# Patient Record
Sex: FEMALE | Race: WHITE | Employment: OTHER | URBAN - METROPOLITAN AREA
[De-identification: names, ages, dates, MRNs, and addresses within clinical notes are randomized per-mention and may not be internally consistent; named-entity substitution may affect disease eponyms.]

---

## 2017-01-26 ENCOUNTER — HOSPITAL ENCOUNTER (OUTPATIENT)
Dept: LAB | Age: 72
Discharge: HOME | End: 2017-01-26
Payer: MEDICARE

## 2017-01-26 ENCOUNTER — OFFICE VISIT (OUTPATIENT)
Dept: INTERNAL MEDICINE CLINIC | Age: 72
End: 2017-01-26

## 2017-01-26 VITALS
RESPIRATION RATE: 14 BRPM | SYSTOLIC BLOOD PRESSURE: 132 MMHG | DIASTOLIC BLOOD PRESSURE: 78 MMHG | HEART RATE: 69 BPM | TEMPERATURE: 97.5 F | OXYGEN SATURATION: 5 % | HEIGHT: 68 IN | BODY MASS INDEX: 21.67 KG/M2 | WEIGHT: 143 LBS

## 2017-01-26 DIAGNOSIS — Z01.818 PREOP EXAMINATION: ICD-10-CM

## 2017-01-26 DIAGNOSIS — E11.9 DIABETES MELLITUS, STABLE (HCC): ICD-10-CM

## 2017-01-26 DIAGNOSIS — G47.33 OSA (OBSTRUCTIVE SLEEP APNEA): ICD-10-CM

## 2017-01-26 DIAGNOSIS — Z01.818 PREOP EXAMINATION: Primary | ICD-10-CM

## 2017-01-26 LAB
ANION GAP BLD CALC-SCNC: 8 MMOL/L (ref 3–18)
BUN SERPL-MCNC: 10 MG/DL (ref 7–18)
BUN/CREAT SERPL: 13 (ref 12–20)
CALCIUM SERPL-MCNC: 9 MG/DL (ref 8.5–10.1)
CHLORIDE SERPL-SCNC: 104 MMOL/L (ref 100–108)
CO2 SERPL-SCNC: 28 MMOL/L (ref 21–32)
CREAT SERPL-MCNC: 0.78 MG/DL (ref 0.6–1.3)
ERYTHROCYTE [DISTWIDTH] IN BLOOD BY AUTOMATED COUNT: 13.8 % (ref 11.6–14.5)
EST. AVERAGE GLUCOSE BLD GHB EST-MCNC: 151 MG/DL
GLUCOSE SERPL-MCNC: 116 MG/DL (ref 74–99)
HBA1C MFR BLD: 6.9 % (ref 4.2–5.6)
HCT VFR BLD AUTO: 43.7 % (ref 35–45)
HGB BLD-MCNC: 14.4 G/DL (ref 12–16)
MCH RBC QN AUTO: 31.4 PG (ref 24–34)
MCHC RBC AUTO-ENTMCNC: 33 G/DL (ref 31–37)
MCV RBC AUTO: 95.2 FL (ref 74–97)
PLATELET # BLD AUTO: 267 K/UL (ref 135–420)
PMV BLD AUTO: 10.4 FL (ref 9.2–11.8)
POTASSIUM SERPL-SCNC: 4 MMOL/L (ref 3.5–5.5)
RBC # BLD AUTO: 4.59 M/UL (ref 4.2–5.3)
SODIUM SERPL-SCNC: 140 MMOL/L (ref 136–145)
WBC # BLD AUTO: 6.5 K/UL (ref 4.6–13.2)

## 2017-01-26 PROCEDURE — 80048 BASIC METABOLIC PNL TOTAL CA: CPT | Performed by: INTERNAL MEDICINE

## 2017-01-26 PROCEDURE — 85027 COMPLETE CBC AUTOMATED: CPT | Performed by: INTERNAL MEDICINE

## 2017-01-26 PROCEDURE — 36415 COLL VENOUS BLD VENIPUNCTURE: CPT | Performed by: INTERNAL MEDICINE

## 2017-01-26 PROCEDURE — 83036 HEMOGLOBIN GLYCOSYLATED A1C: CPT | Performed by: INTERNAL MEDICINE

## 2017-01-26 RX ORDER — BISMUTH SUBSALICYLATE 262 MG
1 TABLET,CHEWABLE ORAL DAILY
COMMUNITY

## 2017-01-26 RX ORDER — TOLTERODINE 4 MG/1
4 CAPSULE, EXTENDED RELEASE ORAL
COMMUNITY
End: 2017-01-26

## 2017-01-26 NOTE — PROGRESS NOTES
Per EKG: NSR no ST changes or T wave inversions. Unchanged from 10/2014 EKG 3/27/14 EKG, but did show sinus bradycardia on 4/4/14 EKG. Pt notified of results at [address] today, 1/26/17.

## 2017-01-26 NOTE — MR AVS SNAPSHOT
Visit Information Date & Time Provider Department Dept. Phone Encounter #  
 1/26/2017  7:00 AM Claudia Washburn MD [facility] [phone] [id number] Follow-up Instructions Return in about 11 days (around 2/6/2017) for pt already has appt. on 2/6/17 with Dr. Ugo Mitchell 2/6/2017  3:45 PM  
PHYSICAL with Usha Uriarte MD  
[facility] [address] [facility]) Appt Note: cpe  
 [address]  
  
   
 [address] [facility] Upcoming Health Maintenance Date Due  
 EYE EXAM RETINAL OR DILATED Q1 10/13/1955 GLAUCOMA SCREENING Q2Y 10/13/2010 FOOT EXAM Q1 12/2/2015 Pneumococcal 65+ Low/Medium Risk (2 of 2 - PPSV23) 1/3/2017 HEMOGLOBIN A1C Q6M 1/18/2017 MICROALBUMIN Q1 7/18/2017 LIPID PANEL Q1 7/18/2017 MEDICARE YEARLY EXAM 7/19/2017 BREAST CANCER SCRN MAMMOGRAM 8/2/2018 COLONOSCOPY 12/30/2018 DTaP/Tdap/Td series (2 - Td) 12/2/2024 Allergies as of 1/26/2017  Review Complete On: 1/26/2017 By: Claudia Washburn MD  
  
 Severity Noted Reaction Type Reactions Pcn [Penicillins] High 04/07/2011    Anaphylaxis, Hives Other reaction(s): anaphylaxis/angioedema Venom-honey Bee High 01/24/2013   Side Effect Anaphylaxis Current Immunizations  Reviewed on 12/2/2014 Name Date Influenza High Dose Vaccine PF 9/16/2016 Influenza Vaccine Split 9/30/2011, 10/19/2010 11:07 AM  
 Pneumococcal Vaccine (Unspecified Type) 1/3/2012 TD Vaccine 10/19/2007 Tdap 12/2/2014  6:50 PM  
 Zoster 10/19/2008 Not reviewed this visit You Were Diagnosed With   
  
 Codes Comments Preop examination    -  Primary ICD-10-CM: Q94.570 ICD-9-CM: V72.84 Diabetes mellitus, stable ([facility] [address]     ICD-10-CM: E11.9 ICD-9-CM: 250.00   
 RAJEEV (obstructive sleep apnea)     ICD-10-CM: G47.33 
ICD-9-CM: 327.23   
  
 Vitals BP Pulse Temp Resp Height(growth percentile) Weight(growth percentile) 132/78 (BP 1 Location: Left arm, BP Patient Position: Sitting) 69 97.5 °F (36.4 °C) (Oral) 14 5' 8\" (1.727 m) 143 lb (64.9 kg) SpO2 BMI OB Status Smoking Status (!) 5% 21.74 kg/m2 Postmenopausal Never Smoker Vitals History BMI and BSA Data Body Mass Index Body Surface Area 21.74 kg/m 2 1.76 m 2 Preferred Pharmacy Pharmacy Name Phone [facility] [phone] Your Updated Medication List  
  
   
This list is accurate as of: 1/26/17  7:42 AM.  Always use your most recent med list.  
  
  
  
  
 albuterol 90 mcg/actuation inhaler Commonly known as:  PROVENTIL HFA, VENTOLIN HFA, PROAIR HFA Take 2 Puffs by inhalation every six (6) hours as needed for Wheezing or Shortness of Breath. aspirin 325 mg tablet Commonly known as:  ASPIRIN Take 2 Tabs by mouth two (2) times a day. beclomethasone 80 mcg/actuation inhaler Commonly known as:  QVAR Take 1 Puff by inhalation two (2) times a day. Blood-Glucose Meter monitoring kit Commonly known as:  Luther Tian Use to test blood sugar daily  
  
 ergocalciferol 50,000 unit capsule Commonly known as:  VITAMIN D2  
TAKE 1 CAP BY MOUTH EVERY WEEK  
  
 estradiol 0.1 mg/24 hr  
Commonly known as:  CLIMARA APPLY 1 PATCH TO SKIN EVERY MONDAY. glucose blood VI test strips strip Commonly known as:  ONETOUCH ULTRA TEST Use to test blood sugar daily Lancets Misc Commonly known as: One Touch Louretta Ditch Use to test blood sugar once daily Lancing Device with Lancets Kit Commonly known as: One Touch Louretta Ditch Use to test blood sugar daily  
  
 metFORMIN  mg tablet Commonly known as:  GLUCOPHAGE XR  
TAKE ONE TABLET BY MOUTH EVERY DAY WITH DINNER  
  
 multivitamin tablet Commonly known as:  ONE A DAY Take 1 Tab by mouth daily. NexIUM 20 mg capsule Generic drug:  esomeprazole Take 1 Cap by mouth daily. simvastatin 20 mg tablet Commonly known as:  ZOCOR  
TAKE 1 TABLET BY MOUTH AT BEDTIME  
  
 traMADol 50 mg tablet Commonly known as:  ULTRAM  
TAKE ONE TABLET BY MOUTH EVERY EIGHT HOURS AS NEEDED FOR PAIN We Performed the Following AMB POC EKG ROUTINE W/ 12 LEADS, INTER & REP [50395 CPT(R)] Follow-up Instructions Return in about 11 days (around 2/6/2017) for pt already has appt. on 2/6/17 with Dr. Zak Irene.  
  
To-Do List   
 01/26/2017 Lab:  CBC W/O DIFF   
  
 01/26/2017 Lab:  HEMOGLOBIN A1C WITH EAG   
  
 01/26/2017 Lab:  METABOLIC PANEL, BASIC Patient Instructions 1) follow-up on 2/6/17 or sooner if worsening symptoms. Introducing [facility] SERVICES! Dear Rupa Faith: Thank you for requesting a [employer] account. Our records indicate that you already have an active [employer] account. You can access your account anytime at [email or web address]. [email or web address] Did you know that you can access your hospital and ER discharge instructions at any time in [employer]? You can also review all of your test results from your hospital stay or ER visit. Additional Information If you have questions, please visit the Frequently Asked Questions section of the [employer] website at [email or web address] Remember, [employer] is NOT to be used for urgent needs. For medical emergencies, dial 911. Now available from your iPhone and Android! Please provide this summary of care documentation to your next provider. Your primary care clinician is listed as [facility]. If you have any questions after today's visit, please call [phone].
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
## 2017-01-26 NOTE — PROGRESS NOTES
[address] [place] [address] [address] presents today for   Chief Complaint   Patient presents with    Pre-op Exam       Mustapha Enriquez preferred language for health care discussion is english/other. Is someone accompanying this pt? no    Is the patient using any DME equipment during OV? no    Depression Screening:  PHQ 2 / 9, over the last two weeks 9/16/2016 7/18/2016 5/24/2016 7/22/2015 4/4/2014 3/28/2014   Little interest or pleasure in doing things Not at all Not at all Not at all Not at all Not at all Not at all   Feeling down, depressed or hopeless Not at all Not at all Not at all Not at all Not at all Not at all   Total Score PHQ 2 0 0 0 0 0 0       Learning Assessment:  Learning Assessment 1/22/2015 11/21/2014 4/4/2014   PRIMARY LEARNER Patient Patient Patient   HIGHEST LEVEL OF EDUCATION - PRIMARY LEARNER  - GRADUATED HIGH SCHOOL OR GED -   BARRIERS PRIMARY LEARNER NONE NONE -   PRIMARY LANGUAGE ENGLISH ENGLISH ENGLISH   LEARNER PREFERENCE PRIMARY READING LISTENING DEMONSTRATION     - READING -     - DEMONSTRATION -   ANSWERED BY pat Patient -   RELATIONSHIP SELF SELF -       Abuse Screening:  Abuse Screening Questionnaire 7/22/2015   Do you ever feel afraid of your partner? N   Are you in a relationship with someone who physically or mentally threatens you? N   Is it safe for you to go home? Y       Fall Risk  Fall Risk Assessment, last 12 mths 9/16/2016 7/18/2016 5/24/2016 7/22/2015 4/4/2014 3/28/2014   Able to walk? Yes Yes Yes Yes Yes Yes   Fall in past 12 months? No No No No No No         Advance Directive:  1. Do you have an advance directive in place? Patient Reply: no    2. If not, would you like material regarding how to put one in place? Patient Reply: no    Coordination of Care:  1. Have you been to the ER, urgent care clinic since your last visit? Hospitalized since your last visit? no    2.  Have you seen or consulted any other health care providers outside of the [facility] since your last visit? Include any pap smears or colon screening.  no

## 2017-01-27 NOTE — PROGRESS NOTES
Please let pt know that labs were normal except:    1) glucose up at 116. Watch carbs and sweets. Was she fasting? 2) hgA1c was good at 6.9. Continue to work on diet and exercise.  Check sugars daily before breakfast.

## 2017-01-28 ENCOUNTER — TELEPHONE (OUTPATIENT)
Dept: INTERNAL MEDICINE CLINIC | Age: 72
End: 2017-01-28

## 2017-01-28 NOTE — TELEPHONE ENCOUNTER
----- Message from Ricki Skiff, MD sent at 1/27/2017 10:06 AM EST -----  Please let pt know that labs were normal except:    1) glucose up at 116. Watch carbs and sweets. Was she fasting? 2) hgA1c was good at 6.9. Continue to work on diet and exercise.  Check sugars daily before breakfast.

## 2017-01-28 NOTE — TELEPHONE ENCOUNTER
Spoke with patient, confirmed name and . Advised patient lab results, per Dr. Christiano Ruvalcaba. Patient verbalized understanding.      Be advised

## 2017-01-31 ENCOUNTER — DOCUMENTATION ONLY (OUTPATIENT)
Dept: INTERNAL MEDICINE CLINIC | Age: 72
End: 2017-01-31

## 2017-02-17 ENCOUNTER — OFFICE VISIT (OUTPATIENT)
Dept: INTERNAL MEDICINE CLINIC | Age: 72
End: 2017-02-17

## 2017-02-17 VITALS
HEART RATE: 71 BPM | HEIGHT: 68 IN | SYSTOLIC BLOOD PRESSURE: 132 MMHG | WEIGHT: 196 LBS | TEMPERATURE: 97.8 F | BODY MASS INDEX: 29.7 KG/M2 | RESPIRATION RATE: 20 BRPM | DIASTOLIC BLOOD PRESSURE: 71 MMHG | OXYGEN SATURATION: 98 %

## 2017-02-17 DIAGNOSIS — J06.9 UPPER RESPIRATORY TRACT INFECTION, UNSPECIFIED TYPE: Primary | ICD-10-CM

## 2017-02-17 RX ORDER — AZITHROMYCIN 250 MG/1
TABLET, FILM COATED ORAL
Qty: 6 TAB | Refills: 0 | Status: SHIPPED | OUTPATIENT
Start: 2017-02-17 | End: 2017-02-22

## 2017-02-17 NOTE — PROGRESS NOTES
Patient presents today with C/O a cough and chest congestion onset two weeks ago  Pt preferred language for healthcare discussion is english. Do you have an advanced directive? No  Is someone accompanying this pt? If so who? No   Is the patient using any DME equipment during OV? No What equipment? 1. Have you been to the ER, urgent care clinic since your last visit? Hospitalized since your last visit?  No

## 2017-02-17 NOTE — MR AVS SNAPSHOT
Visit Information Date & Time Provider Department Dept. Phone Encounter #  
 2/17/2017  8:45 AM Esther Dey [facility] [phone] [id number] Your Appointments 2/23/2017  2:00 PM  
PHYSICAL with Reina Justice MD  
[facility]) Appt Note: COUGH & CONGESTION; cpe  
 [address]  
  
   
 [address] [facility] Upcoming Health Maintenance Date Due  
 EYE EXAM RETINAL OR DILATED Q1 10/13/1955 GLAUCOMA SCREENING Q2Y 10/13/2010 FOOT EXAM Q1 12/2/2015 Pneumococcal 65+ Low/Medium Risk (2 of 2 - PPSV23) 1/3/2017 MICROALBUMIN Q1 7/18/2017 LIPID PANEL Q1 7/18/2017 MEDICARE YEARLY EXAM 7/19/2017 HEMOGLOBIN A1C Q6M 7/26/2017 BREAST CANCER SCRN MAMMOGRAM 8/2/2018 COLONOSCOPY 12/30/2018 DTaP/Tdap/Td series (2 - Td) 12/2/2024 Allergies as of 2/17/2017  Review Complete On: 2/17/2017 By: Robert Chavarria LPN Severity Noted Reaction Type Reactions Pcn [Penicillins] High 04/07/2011    Anaphylaxis, Hives Other reaction(s): anaphylaxis/angioedema Venom-honey Bee High 01/24/2013   Side Effect Anaphylaxis Current Immunizations  Reviewed on 12/2/2014 Name Date Influenza High Dose Vaccine PF 9/16/2016 Influenza Vaccine Split 9/30/2011, 10/19/2010 11:07 AM  
 Pneumococcal Vaccine (Unspecified Type) 1/3/2012 TD Vaccine 10/19/2007 Tdap 12/2/2014  6:50 PM  
 Zoster 10/19/2008 Not reviewed this visit You Were Diagnosed With   
  
 Codes Comments Upper respiratory tract infection, unspecified type    -  Primary ICD-10-CM: J06.9 ICD-9-CM: 465.9 Vitals BP Pulse Temp Resp Height(growth percentile) Weight(growth percentile) 132/71 (BP 1 Location: Left arm, BP Patient Position: Sitting) 71 97.8 °F (36.6 °C) (Oral) 20 5' 8\" (1.727 m) 196 lb (88.9 kg) SpO2 BMI OB Status Smoking Status 98% 29.8 kg/m2 Postmenopausal Never Smoker Vitals History BMI and BSA Data Body Mass Index Body Surface Area  
 29.8 kg/m 2 2.07 m 2 Preferred Pharmacy Pharmacy Name Phone [facility], [address] [place] [address] [phone] Your Updated Medication List  
  
   
This list is accurate as of: 2/17/17  9:21 AM.  Always use your most recent med list.  
  
  
  
  
 albuterol 90 mcg/actuation inhaler Commonly known as:  PROVENTIL HFA, VENTOLIN HFA, PROAIR HFA Take 2 Puffs by inhalation every six (6) hours as needed for Wheezing or Shortness of Breath. aspirin 325 mg tablet Commonly known as:  ASPIRIN Take 2 Tabs by mouth two (2) times a day. azithromycin 250 mg tablet Commonly known as:  Bennetta Plum Take 2 tablets today, then take 1 tablet daily  
  
 beclomethasone 80 mcg/actuation inhaler Commonly known as:  QVAR Take 1 Puff by inhalation two (2) times a day. Blood-Glucose Meter monitoring kit Commonly known as:  Jenny Rodriguez Use to test blood sugar daily  
  
 ergocalciferol 50,000 unit capsule Commonly known as:  VITAMIN D2  
TAKE 1 CAP BY MOUTH EVERY WEEK  
  
 estradiol 0.1 mg/24 hr  
Commonly known as:  CLIMARA APPLY 1 PATCH TO SKIN EVERY MONDAY. glucose blood VI test strips strip Commonly known as:  ONETOUCH ULTRA TEST Use to test blood sugar daily Lancets Misc Commonly known as: One Touch Georgann Mcardle Use to test blood sugar once daily Lancing Device with Lancets Kit Commonly known as: One Touch Georgann Mcardle Use to test blood sugar daily  
  
 metFORMIN  mg tablet Commonly known as:  GLUCOPHAGE XR  
TAKE ONE TABLET BY MOUTH EVERY DAY WITH DINNER  
  
 multivitamin tablet Commonly known as:  ONE A DAY Take 1 Tab by mouth daily. NexIUM 20 mg capsule Generic drug:  esomeprazole Take 1 Cap by mouth daily. simvastatin 20 mg tablet Commonly known as:  ZOCOR  
TAKE 1 TABLET BY MOUTH AT BEDTIME  
  
 traMADol 50 mg tablet Commonly known as:  ULTRAM  
TAKE ONE TABLET BY MOUTH EVERY EIGHT HOURS AS NEEDED FOR PAIN Prescriptions Sent to Pharmacy Refills  
 azithromycin (ZITHROMAX) 250 mg tablet 0 Sig: Take 2 tablets today, then take 1 tablet daily Class: Normal  
 Pharmacy: [facility] #: [phone] Patient Instructions Upper Respiratory Infection (Cold): Care Instructions Your Care Instructions An upper respiratory infection, or URI, is an infection of the nose, sinuses, or throat. URIs are spread by coughs, sneezes, and direct contact. The common cold is the most frequent kind of URI. The flu and sinus infections are other kinds of URIs. Almost all URIs are caused by viruses. Antibiotics won't cure them. But you can treat most infections with home care. This may include drinking lots of fluids and taking over-the-counter pain medicine. You will probably feel better in 4 to 10 days. The doctor has checked you carefully, but problems can develop later. If you notice any problems or new symptoms, get medical treatment right away. Follow-up care is a key part of your treatment and safety. Be sure to make and go to all appointments, and call your doctor if you are having problems. It's also a good idea to know your test results and keep a list of the medicines you take. How can you care for yourself at home? · To prevent dehydration, drink plenty of fluids, enough so that your urine is light yellow or clear like water. Choose water and other caffeine-free clear liquids until you feel better. If you have kidney, heart, or liver disease and have to limit fluids, talk with your doctor before you increase the amount of fluids you drink.  
· Take an over-the-counter pain medicine, such as acetaminophen (Tylenol), ibuprofen (Advil, Motrin), or naproxen (Aleve). Read and follow all instructions on the label. · Before you use cough and cold medicines, check the label. These medicines may not be safe for young children or for people with certain health problems. · Be careful when taking over-the-counter cold or flu medicines and Tylenol at the same time. Many of these medicines have acetaminophen, which is Tylenol. Read the labels to make sure that you are not taking more than the recommended dose. Too much acetaminophen (Tylenol) can be harmful. · Get plenty of rest. 
· Do not smoke or allow others to smoke around you. If you need help quitting, talk to your doctor about stop-smoking programs and medicines. These can increase your chances of quitting for good. When should you call for help? Call 911 anytime you think you may need emergency care. For example, call if: 
· You have severe trouble breathing. Call your doctor now or seek immediate medical care if: 
· You seem to be getting much sicker. · You have new or worse trouble breathing. · You have a new or higher fever. · You have a new rash. Watch closely for changes in your health, and be sure to contact your doctor if: 
· You have a new symptom, such as a sore throat, an earache, or sinus pain. · You cough more deeply or more often, especially if you notice more mucus or a change in the color of your mucus. · You do not get better as expected. Where can you learn more? Go to http://zandra-gerard.info/. Enter I482 in the search box to learn more about \"Upper Respiratory Infection (Cold): Care Instructions. \" Current as of: June 30, 2016 Content Version: 11.1 © 1339-6324 License [employer]. Care instructions adapted under license by [employer] (which disclaims liability or warranty for this information).  If you have questions about a medical condition or this instruction, always ask your healthcare professional. [address] any warranty or liability for your use of this information. Introducing \[facility] [place] [facility]\"" & HEALTH SERVICES! Dear Mike Marin: Thank you for requesting a [employer] account. Our records indicate that you already have an active [employer] account. You can access your account anytime at [email or web address] Did you know that you can access your hospital and ER discharge instructions at any time in [employer]? You can also review all of your test results from your hospital stay or ER visit. Additional Information If you have questions, please visit the Frequently Asked Questions section of the [employer] website at [email or web address] Remember, [employer] is NOT to be used for urgent needs. For medical emergencies, dial 911. Now available from your iPhone and Android! Please provide this summary of care documentation to your next provider. Your primary care clinician is listed as [facility]. If you have any questions after today's visit, please call [phone].

## 2017-02-17 NOTE — PATIENT INSTRUCTIONS
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
## 2017-02-23 ENCOUNTER — OFFICE VISIT (OUTPATIENT)
Dept: INTERNAL MEDICINE CLINIC | Age: 72
End: 2017-02-23

## 2017-02-23 VITALS
HEART RATE: 81 BPM | WEIGHT: 193.8 LBS | SYSTOLIC BLOOD PRESSURE: 146 MMHG | BODY MASS INDEX: 29.37 KG/M2 | RESPIRATION RATE: 18 BRPM | DIASTOLIC BLOOD PRESSURE: 81 MMHG | HEIGHT: 68 IN | OXYGEN SATURATION: 96 % | TEMPERATURE: 97.6 F

## 2017-02-23 DIAGNOSIS — G25.81 RLS (RESTLESS LEGS SYNDROME): ICD-10-CM

## 2017-02-23 DIAGNOSIS — R39.11 URINARY HESITANCY: Primary | ICD-10-CM

## 2017-02-23 DIAGNOSIS — R10.9 FLANK PAIN: ICD-10-CM

## 2017-02-23 DIAGNOSIS — G25.0 BENIGN ESSENTIAL TREMOR: ICD-10-CM

## 2017-02-23 DIAGNOSIS — R13.10 DYSPHAGIA, UNSPECIFIED TYPE: ICD-10-CM

## 2017-02-23 DIAGNOSIS — R10.11 RUQ PAIN: ICD-10-CM

## 2017-02-23 RX ORDER — ROPINIROLE 0.5 MG/1
0.5 TABLET, FILM COATED ORAL
Qty: 60 TAB | Refills: 5 | Status: SHIPPED | OUTPATIENT
Start: 2017-02-23 | End: 2017-08-11 | Stop reason: SDUPTHER

## 2017-02-23 NOTE — PROGRESS NOTES
HISTORY OF PRESENT ILLNESS  Areli Huffman is a 70 y.o. female. Visit Vitals    /81    Pulse 81    Temp 97.6 °F (36.4 °C) (Oral)    Resp 18    Ht 5' 8\" (1.727 m)    Wt 193 lb 12.8 oz (87.9 kg)    SpO2 96%    BMI 29.47 kg/m2       HPI Comments: Has persistent pain in her right side. Dull ache with sharp component \"all day or night\"    Has constant need to move her legs. Noted when sitting still for a while she feels need to move. Continual movement. New sharp lateral  left knee pain. Has hemorrhoids. So not really bothering her. But she says a lump in the \"crack\" which is new since her last colonoscopy. Had questions re PAP--but she has had a hysterectomy    Still c/o difficulty swallowing. \"medicines get stuck and some food gets stuck. \"    Tremors   The history is provided by the patient (when holding a glass or a piece of paper, etc). This is a new problem. The current episode started more than 1 week ago. The problem occurs daily. Nothing aggravates the symptoms. Nothing relieves the symptoms. She has tried nothing for the symptoms. Mass   The history is provided by the patient (lump on her neck). This is a chronic problem. The current episode started more than 1 week ago. The problem occurs daily. The problem has not changed since onset. Urinary Retention    The history is provided by the patient (some occasional leakage noted. Denies leakage with cough or sneeze. Sometimes can leni there fast enough, sometimes not. Sometimes has to strain to void). This is a chronic problem. The problem occurs intermittently. Progression since onset: she was referred to urology last year but apparently never went. Review of Systems   Neurological: Positive for tremors. Physical Exam   Constitutional: She is oriented to person, place, and time. She appears well-developed and well-nourished. No distress. HENT:   Head:       Cardiovascular: Normal rate.     Pulmonary/Chest: Effort normal.   Abdominal: Soft. Bowel sounds are normal. There is tenderness (RUQ and R mid abdomen). Genitourinary:   Genitourinary Comments: Nothing apparent in the gluteal cleft   Neurological: She is alert and oriented to person, place, and time. Skin: Skin is warm and dry. She is not diaphoretic. Nursing note and vitals reviewed. ASSESSMENT and PLAN    ICD-10-CM ICD-9-CM    1. Urinary hesitancy R39.11 788.64 REFERRAL TO UROLOGY      CT ABD PELV W WO CONT   2. Dysphagia, unspecified type R13.10 787.20 REFERRAL TO ENT-OTOLARYNGOLOGY   3. RUQ pain R10.11 789.01 CT ABD PELV W WO CONT   4. Flank pain R10.9 789.09 CT ABD PELV W WO CONT   5. Benign essential tremor G25.0 333.1    6. RLS (restless legs syndrome) G25.81 333.94 rOPINIRole (REQUIP) 0.5 mg tablet       Multiple issues addressed. Referrals as noted. She had been referred to urology a year ago but did not go. ENT can evaluate the dysphagia and the \"mass\" on her left neck. As it comes and goes, I suspect it is a lymph node but there were no others of note    CT abd and pelvis requested given the right flank pain and the pain on palpation.  She has remote h/o colon cancer in 2004, but OK colonoscopy in 2013    F/u [facility] for [facility]

## 2017-02-23 NOTE — PROGRESS NOTES
Chief Complaint   Patient presents with    Tremors     left hand, pt unable to recall when the shaking started    Mass     on left side of neck    Urinary Retention     pt states that she strains when she urinates    Flank Pain     right side    Rectal Mass     Pt preferred language for health care discussion is english. Is someone accompanying this pt? no    Is the patient using any DME equipment during OV? no    Depression Screening completed. Yes    Learning Assessment completed. Yes    Abuse Screening completed. Yes    Health Maintenance reviewed and discussed per provider. Yes    Pt is due for Foot exam, Diabetic eye exam.  Please order/place referral if appropriate. Advance Directive:  1. Do you have an advance directive in place? Patient Reply: No    2. If not, would you like material regarding how to put one in place? Patient Reply: No    Coordination of Care:  1. Have you been to the ER, urgent care clinic since your last visit? Hospitalized since your last visit? No    2. Have you seen or consulted any other health care providers outside of the [address] [place] [address] since your last visit? Include any pap smears or colon screening.  No

## 2017-02-23 NOTE — MR AVS SNAPSHOT
Visit Information Date & Time Provider Department Dept. Phone Encounter #  
 2/23/2017  2:00 PM Nonaumair Barnes mokono [phone] [id number] Follow-up Instructions Return in about 4 months (around 7/5/2017) for Medicare Wellness Visit, HTN, DM, cholesterol. Upcoming Health Maintenance Date Due  
 EYE EXAM RETINAL OR DILATED Q1 10[birth year]13/1955 GLAUCOMA SCREENING Q2Y 10/13/2010 FOOT EXAM Q1 12/2/2015 Pneumococcal 65+ Low/Medium Risk (2 of 2 - PPSV23) 1/3/2017 MICROALBUMIN Q1 7/18/2017 LIPID PANEL Q1 7/18/2017 MEDICARE YEARLY EXAM 7/19/2017 HEMOGLOBIN A1C Q6M 7/26/2017 BREAST CANCER SCRN MAMMOGRAM 8/2/2018 COLONOSCOPY 12/30/2018 DTaP/Tdap/Td series (2 - Td) 12/2/2024 Allergies as of 2/23/2017  Review Complete On: 2/23/2017 By: Soumya Barnes MD  
  
 Severity Noted Reaction Type Reactions Pcn [Penicillins] High 04/07/2011    Anaphylaxis, Hives Other reaction(s): anaphylaxis/angioedema Venom-honey Bee High 01/24/2013   Side Effect Anaphylaxis Current Immunizations  Reviewed on 12/2/2014 Name Date Influenza High Dose Vaccine PF 9/16/2016 Influenza Vaccine Split 9/30/2011, 10/19/2010 11:07 AM  
 Pneumococcal Vaccine (Unspecified Type) 1/3/2012 TD Vaccine 10/19/2007 Tdap 12/2/2014  6:50 PM  
 Zoster 10/19/2008 Not reviewed this visit You Were Diagnosed With   
  
 Codes Comments Urinary hesitancy    -  Primary ICD-10-CM: R39.11 ICD-9-CM: 788.64 Dysphagia, unspecified type     ICD-10-CM: R13.10 ICD-9-CM: 787.20   
 RUQ pain     ICD-10-CM: R10.11 ICD-9-CM: 789.01 Flank pain     ICD-10-CM: R10.9 ICD-9-CM: 789.09 Benign essential tremor     ICD-10-CM: G25.0 ICD-9-CM: 333.1 RLS (restless legs syndrome)     ICD-10-CM: G25.81 ICD-9-CM: 333.94 Vitals BP  
  
  
  
  
  
 146/81 BMI and BSA Data Body Mass Index Body Surface Area 29.47 kg/m 2 2.05 m 2 Preferred Pharmacy Pharmacy Name Phone [facility], [facility] [phone] Your Updated Medication List  
  
   
This list is accurate as of: 2/23/17  3:00 PM.  Always use your most recent med list.  
  
  
  
  
 albuterol 90 mcg/actuation inhaler Commonly known as:  PROVENTIL HFA, VENTOLIN HFA, PROAIR HFA Take 2 Puffs by inhalation every six (6) hours as needed for Wheezing or Shortness of Breath. aspirin 325 mg tablet Commonly known as:  ASPIRIN Take 2 Tabs by mouth two (2) times a day. beclomethasone 80 mcg/actuation inhaler Commonly known as:  QVAR Take 1 Puff by inhalation two (2) times a day. Blood-Glucose Meter monitoring kit Commonly known as:  Brian Stone Use to test blood sugar daily  
  
 ergocalciferol 50,000 unit capsule Commonly known as:  VITAMIN D2  
TAKE 1 CAP BY MOUTH EVERY WEEK  
  
 estradiol 0.1 mg/24 hr  
Commonly known as:  CLIMARA APPLY 1 PATCH TO SKIN EVERY MONDAY. glucose blood VI test strips strip Commonly known as:  ONETOUCH ULTRA TEST Use to test blood sugar daily Lancets Misc Commonly known as: One Touch Selene Allendale Use to test blood sugar once daily Lancing Device with Lancets Kit Commonly known as: One Touch [place] All[place] Use to test blood sugar daily  
  
 metFORMIN  mg tablet Commonly known as:  GLUCOPHAGE XR  
TAKE ONE TABLET BY MOUTH EVERY DAY WITH DINNER  
  
 multivitamin tablet Commonly known as:  ONE A DAY Take 1 Tab by mouth daily. NexIUM 20 mg capsule Generic drug:  esomeprazole Take 1 Cap by mouth daily. rOPINIRole 0.5 mg tablet Commonly known as:  Eddie Sherwood Take 1 Tab by mouth two (2) times daily as needed. Indications: Restless Legs Syndrome  
  
 simvastatin 20 mg tablet Commonly known as:  ZOCOR  
TAKE 1 TABLET BY MOUTH AT BEDTIME  
  
 traMADol 50 mg tablet Commonly known as:  ULTRAM  
TAKE ONE TABLET BY MOUTH EVERY EIGHT HOURS AS NEEDED FOR PAIN Prescriptions Sent to Pharmacy Refills  
 rOPINIRole (REQUIP) 0.5 mg tablet 5 Sig: Take 1 Tab by mouth two (2) times daily as needed. Indications: Restless Legs Syndrome Class: Normal  
 Pharmacy: [id number] [address] [facility] [facility] #: [phone] Route: Oral  
  
We Performed the Following REFERRAL TO ENT-OTOLARYNGOLOGY [OVU91 Custom] Comments:  
 Please evaluate patient for difficulty swallowing. Katia Fariba REFERRAL TO UROLOGY [VRQ025 Custom] Comments:  
 Please evaluate patient for urinary hesitancy, occasional leakage. Follow-up Instructions Return in about 4 months (around 7/5/2017) for Medicare Wellness Visit, HTN, DM, cholesterol. To-Do List   
 02/23/2017 Imaging:  CT ABD PELV W WO CONT Referral Information Referral ID Referred By Referred To  
  
 [id number] [facility] ZACH Caban MD   
   [address] [place] [address] Phone: [phone] Fax: [phone] Visits Status Start Date End Date 1 New Request 2/23/17 2/23/18 If your referral has a status of pending review or denied, additional information will be sent to support the outcome of this decision. Referral ID Referred By Referred To  
 [id number] [facility] MD Martine Peng [address] Phone: [phone] Fax: [phone] Visits Status Start Date End Date 1 New Request 2/23/17 2/23/18 If your referral has a status of pending review or denied, additional information will be sent to support the outcome of this decision. Introducing [facility] & HEALTH SERVICES! Dear Leigha Willis: Thank you for requesting a Featherlight account. Our records indicate that you already have an active Featherlight account.   You can access your account anytime at [email or web address] Did you know that you can access your hospital and ER discharge instructions at any time in ControlScan? You can also review all of your test results from your hospital stay or ER visit. Additional Information If you have questions, please visit the Frequently Asked Questions section of the ControlScan website at [email or web address] Remember, ControlScan is NOT to be used for urgent needs. For medical emergencies, dial 911. Now available from your iPhone and Android! Please provide this summary of care documentation to your next provider. Your primary care clinician is listed as [facility]. If you have any questions after today's visit, please call [phone].

## 2017-03-16 ENCOUNTER — HOSPITAL ENCOUNTER (OUTPATIENT)
Dept: CT IMAGING | Age: 72
Discharge: HOME | End: 2017-03-16
Attending: INTERNAL MEDICINE
Payer: MEDICARE

## 2017-03-16 DIAGNOSIS — R10.11 RUQ PAIN: ICD-10-CM

## 2017-03-16 DIAGNOSIS — R39.11 URINARY HESITANCY: ICD-10-CM

## 2017-03-16 DIAGNOSIS — R10.9 FLANK PAIN: ICD-10-CM

## 2017-03-16 LAB — CREAT UR-MCNC: 0.7 MG/DL (ref 0.6–1.3)

## 2017-03-16 PROCEDURE — 74177 CT ABD & PELVIS W/CONTRAST: CPT

## 2017-03-16 PROCEDURE — 74011636320 HC RX REV CODE- 636/320: Performed by: INTERNAL MEDICINE

## 2017-03-16 PROCEDURE — 74011000255 HC RX REV CODE- 255: Performed by: INTERNAL MEDICINE

## 2017-03-16 PROCEDURE — 82565 ASSAY OF CREATININE: CPT

## 2017-03-16 RX ORDER — BARIUM SULFATE 20 MG/ML
900 SUSPENSION ORAL
Status: COMPLETED | OUTPATIENT
Start: 2017-03-16 | End: 2017-03-16

## 2017-03-16 RX ADMIN — BARIUM SULFATE 900 ML: 21 SUSPENSION ORAL at 14:34

## 2017-03-16 RX ADMIN — IOPAMIDOL 100 ML: 612 INJECTION, SOLUTION INTRAVENOUS at 14:35

## 2017-03-24 ENCOUNTER — TELEPHONE (OUTPATIENT)
Dept: INTERNAL MEDICINE CLINIC | Age: 72
End: 2017-03-24

## 2017-03-24 NOTE — TELEPHONE ENCOUNTER
Patient called and said she is still having a cough at first it was clear now its yellow mucus and its in the bottom of her throat.  She would like a nurse to call her

## 2017-03-25 ENCOUNTER — OFFICE VISIT (OUTPATIENT)
Dept: INTERNAL MEDICINE CLINIC | Age: 72
End: 2017-03-25

## 2017-03-25 VITALS
HEART RATE: 74 BPM | RESPIRATION RATE: 16 BRPM | DIASTOLIC BLOOD PRESSURE: 68 MMHG | BODY MASS INDEX: 29.4 KG/M2 | TEMPERATURE: 98.1 F | WEIGHT: 194 LBS | HEIGHT: 68 IN | OXYGEN SATURATION: 95 % | SYSTOLIC BLOOD PRESSURE: 144 MMHG

## 2017-03-25 DIAGNOSIS — K21.9 GASTROESOPHAGEAL REFLUX DISEASE, ESOPHAGITIS PRESENCE NOT SPECIFIED: ICD-10-CM

## 2017-03-25 DIAGNOSIS — R05.9 COUGH: Primary | ICD-10-CM

## 2017-03-25 RX ORDER — HYDROCODONE POLISTIREX AND CHLORPHENIRAMINE POLISTIREX 10; 8 MG/5ML; MG/5ML
5 SUSPENSION, EXTENDED RELEASE ORAL
Qty: 120 ML | Refills: 0 | Status: SHIPPED | OUTPATIENT
Start: 2017-03-25 | End: 2018-01-29

## 2017-03-25 RX ORDER — RANITIDINE 150 MG/1
150 TABLET, FILM COATED ORAL 2 TIMES DAILY
Qty: 60 TAB | Refills: 0 | Status: SHIPPED | OUTPATIENT
Start: 2017-03-25 | End: 2018-01-29

## 2017-03-25 RX ORDER — FLUTICASONE PROPIONATE 50 MCG
2 SPRAY, SUSPENSION (ML) NASAL DAILY
Qty: 1 BOTTLE | Refills: 1 | Status: SHIPPED | OUTPATIENT
Start: 2017-03-25 | End: 2017-08-11 | Stop reason: SDUPTHER
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
## 2017-03-25 NOTE — TELEPHONE ENCOUNTER
Contacted pt at [facility] number. Two patient Identifiers confirmed. Pt stated she is currently having yellow mucous and is afraid she has an infection. Pt request that Dr Keke Petty send her something to the pharmacy. Advised pt that Dr Keke Petty was out of the office but or Immediate Care was open today. Pt verbalized understanding and stated she will be in today.

## 2017-03-25 NOTE — MR AVS SNAPSHOT
Visit Information Date & Time Provider Department Dept. Phone Encounter #  
 3/25/2017  4:30 PM Beatrice Cruz, [facility] [phone] Follow-up Instructions Return if symptoms worsen or fail to improve. Your Appointments 6/28/2017  9:10 AM  
ESTABLISHED PATIENT with Marily Estes NP [facility] [address] ([facility]) Appt Note: Return in about 3 months (around 6/24/2017) for F/U OAB. [address] [place] [id number]  
[phone]  
  
   
 [place] [id number] Upcoming Health Maintenance Date Due  
 EYE EXAM RETINAL OR DILATED Q1 10[birth year]13/1955 GLAUCOMA SCREENING Q2Y 10/13/2010 FOOT EXAM Q1 12/2/2015 Pneumococcal 65+ Low/Medium Risk (2 of 2 - PPSV23) 1/3/2017 MICROALBUMIN Q1 7/18/2017 LIPID PANEL Q1 7/18/2017 MEDICARE YEARLY EXAM 7/19/2017 HEMOGLOBIN A1C Q6M 7/26/2017 BREAST CANCER SCRN MAMMOGRAM 8/2/2018 COLONOSCOPY 12/30/2018 DTaP/Tdap/Td series (2 - Td) 12/2/2024 Allergies as of 3/25/2017  Review Complete On: 3/25/2017 By: Beatrice Cruz DO Severity Noted Reaction Type Reactions Pcn [Penicillins] High 04/07/2011    Anaphylaxis, Hives Other reaction(s): anaphylaxis/angioedema Venom-honey Bee High 01/24/2013   Side Effect Anaphylaxis Current Immunizations  Reviewed on 12/2/2014 Name Date Influenza High Dose Vaccine PF 9/16/2016 Influenza Vaccine Split 9/30/2011, 10/19/2010 11:07 AM  
 Pneumococcal Vaccine (Unspecified Type) 1/3/2012 TD Vaccine 10/19/2007 Tdap 12/2/2014  6:50 PM  
 Zoster 10/19/2008 Not reviewed this visit You Were Diagnosed With   
  
 Codes Comments Cough    -  Primary ICD-10-CM: B02 ICD-9-CM: 786.2 Gastroesophageal reflux disease, esophagitis presence not specified     ICD-10-CM: K21.9 ICD-9-CM: 530.81 Vitals BP Pulse Temp Resp Height(growth percentile) Weight(growth percentile) 144/68 (BP 1 Location: Left arm, BP Patient Position: Sitting) 74 98.1 °F (36.7 °C) (Oral) 16 5' 8\" (1.727 m) 194 lb (88 kg) SpO2 BMI OB Status Smoking Status 95% 29.5 kg/m2 Postmenopausal Never Smoker Vitals History BMI and BSA Data Body Mass Index Body Surface Area  
 29.5 kg/m 2 2.05 m 2 Preferred Pharmacy Pharmacy Name Phone [facility], [address] [phone] Your Updated Medication List  
  
   
This list is accurate as of: 3/25/17  5:16 PM.  Always use your most recent med list.  
  
  
  
  
 albuterol 90 mcg/actuation inhaler Commonly known as:  PROVENTIL HFA, VENTOLIN HFA, PROAIR HFA Take 2 Puffs by inhalation every six (6) hours as needed for Wheezing or Shortness of Breath. aspirin 325 mg tablet Commonly known as:  ASPIRIN Take 2 Tabs by mouth two (2) times a day. beclomethasone 80 mcg/actuation [employer] Commonly known as:  QVAR Take 1 Puff by inhalation two (2) times a day. Blood-Glucose Meter monitoring kit Commonly known as:  Ana Leong Use to test blood sugar daily  
  
 chlorpheniramine-HYDROcodone 10-8 mg/5 mL suspension Commonly known as:  Liliana Favorite Take 5 mL by mouth every twelve (12) hours as needed for Cough. Max Daily Amount: 10 mL. ergocalciferol 50,000 unit capsule Commonly known as:  VITAMIN D2  
TAKE 1 CAP BY MOUTH EVERY WEEK  
  
 estradiol 0.1 mg/24 hr  
Commonly known as:  CLIMARA APPLY 1 PATCH TO SKIN EVERY MONDAY. fluticasone 50 mcg/actuation nasal spray Commonly known as:  Caffie Euler 2 Sprays by Both Nostrils route daily. glucose blood VI test strips strip Commonly known as:  ONETOUCH ULTRA TEST Use to test blood sugar daily Lancets Misc Commonly known as: One Touch Sherel Mary Use to test blood sugar once daily Lancing Device with Lancets Kit Commonly known as: One Touch Blease Rocael Use to test blood sugar daily  
  
 metFORMIN  mg tablet Commonly known as:  GLUCOPHAGE XR  
TAKE ONE TABLET BY MOUTH EVERY DAY WITH DINNER  
  
 multivitamin tablet Commonly known as:  ONE A DAY Take 1 Tab by mouth daily. raNITIdine 150 mg tablet Commonly known as:  ZANTAC Take 1 Tab by mouth two (2) times a day. rOPINIRole 0.5 mg tablet Commonly known as:  Lily Gilbert Take 1 Tab by mouth two (2) times daily as needed. Indications: Restless Legs Syndrome  
  
 simvastatin 20 mg tablet Commonly known as:  ZOCOR  
TAKE 1 TABLET BY MOUTH AT BEDTIME  
  
 traMADol 50 mg tablet Commonly known as:  ULTRAM  
TAKE ONE TABLET BY MOUTH EVERY EIGHT HOURS AS NEEDED FOR PAIN Prescriptions Printed Refills  
 chlorpheniramine-HYDROcodone (TUSSIONEX) 10-8 mg/5 mL suspension 0 Sig: Take 5 mL by mouth every twelve (12) hours as needed for Cough. Max Daily Amount: 10 mL. Class: Print Route: Oral  
  
Prescriptions Sent to Pharmacy Refills  
 raNITIdine (ZANTAC) 150 mg tablet 0 Sig: Take 1 Tab by mouth two (2) times a day. Class: Normal  
 Pharmacy: [facility] [address] [place] [address] Ph #: [phone] Route: Oral  
 fluticasone (FLONASE) 50 mcg/actuation nasal spray 1 Si Sprays by Both Nostrils route daily. Class: Normal  
 Pharmacy: [facility] [address] [place] [address] Ph #: [phone] Route: Both Nostrils Follow-up Instructions Return if symptoms worsen or fail to improve. Introducing [facility] & HEALTH SERVICES! Dear Leigha Willis: Thank you for requesting a Smallknot account. Our records indicate that you already have an active Smallknot account. You can access your account anytime at [email or web address] [email or web address] Did you know that you can access your hospital and ER discharge instructions at any time in Gridpoint Systems? You can also review all of your test results from your hospital stay or ER visit. Additional Information If you have questions, please visit the Frequently Asked Questions section of the Gridpoint Systems website at [email or web address] Remember, Gridpoint Systems is NOT to be used for urgent needs. For medical emergencies, dial 911. Now available from your iPhone and Android! Please provide this summary of care documentation to your next provider. Your primary care clinician is listed as [facility]. If you have any questions after today's visit, please call [phone].

## 2017-03-25 NOTE — PROGRESS NOTES
Pt presented today coughing x 1.5 weeks ago. Pt now coughing clear and yellow phlegm. Pt denied taking any otc medication and then stated she did take a mucinex yesterday. Has pt had any falls since last 30 days? No.  Pt preferred language for health care discussion is english. Advanced Directive? Is someone accompanying this pt? No    Is the patient using any DME equipment during OV? no      1. Have you been to the ER, urgent care clinic since your last 30 days? Hospitalized since your last 30 days? No    2. Have you seen or consulted any other health care providers outside of the [address] [place] [address] since your last 30 days?  no      Pt  has a reminder for a \"due or due soon\" health maintenance. I have asked that  she contact his primary care provider for follow-up on this health maintenance.

## 2017-04-20 ENCOUNTER — OFFICE VISIT (OUTPATIENT)
Dept: INTERNAL MEDICINE CLINIC | Age: 72
End: 2017-04-20

## 2017-04-20 VITALS
OXYGEN SATURATION: 97 % | WEIGHT: 194 LBS | RESPIRATION RATE: 16 BRPM | SYSTOLIC BLOOD PRESSURE: 140 MMHG | BODY MASS INDEX: 29.4 KG/M2 | DIASTOLIC BLOOD PRESSURE: 66 MMHG | HEART RATE: 76 BPM | HEIGHT: 68 IN | TEMPERATURE: 98 F

## 2017-04-20 DIAGNOSIS — J02.9 SORE THROAT: ICD-10-CM

## 2017-04-20 DIAGNOSIS — R09.81 SINUS CONGESTION: ICD-10-CM

## 2017-04-20 DIAGNOSIS — R05.9 COUGH: Primary | ICD-10-CM

## 2017-04-20 RX ORDER — BENZONATATE 100 MG/1
100 CAPSULE ORAL
Qty: 21 CAP | Refills: 0 | Status: SHIPPED | OUTPATIENT
Start: 2017-04-20 | End: 2017-04-27

## 2017-04-20 RX ORDER — AZITHROMYCIN 250 MG/1
TABLET, FILM COATED ORAL
Qty: 6 TAB | Refills: 0 | Status: SHIPPED | OUTPATIENT
Start: 2017-04-20 | End: 2017-04-25

## 2017-04-20 NOTE — MR AVS SNAPSHOT
Visit Information Date & Time Provider Department Dept. Phone Encounter #  
 4/20/2017  9:00 AM Chinyere Freeman NP [facility] [phone] [id number] Your Appointments 4/24/2017 10:00 AM  
New Patient with Celestino Tripathi PT [facility]. Neel Cohen 98 ([facility]) Appt Note: NP Medicare/Rai; Pt has nppw, came into office to sched didn't know she had an appt today but had the letter w/her//afj  
 [address] [facility] [id number]  
  
    
 5/4/2017  9:30 AM  
PHYSICAL THERAPY with Celestino Tripathi PT [facility]. Neel Cohen 98 ([facility]) Appt Note: Rai CHENEY  F/u//afj  
 [address]  
  
   
 [phone]  
  
    
 6/28/2017  9:10 AM  
ESTABLISHED PATIENT with Valencia Keys NP [facility]. [address] ([facility]) Appt Note: Return in about 3 months (around 6/24/2017) for F/U OAB. [address] [address]  
[phone]  
  
   
 [facility] [phone] Upcoming Health Maintenance Date Due  
 EYE EXAM RETINAL OR DILATED Q1 10/13/1955 GLAUCOMA SCREENING Q2Y 10/13/2010 FOOT EXAM Q1 12/2/2015 Pneumococcal 65+ Low/Medium Risk (2 of 2 - PPSV23) 1/3/2017 MICROALBUMIN Q1 7/18/2017 LIPID PANEL Q1 7/18/2017 MEDICARE YEARLY EXAM 7/19/2017 HEMOGLOBIN A1C Q6M 7/26/2017 BREAST CANCER SCRN MAMMOGRAM 8/2/2018 COLONOSCOPY 12/30/2018 DTaP/Tdap/Td series (2 - Td) 12/2/2024 Allergies as of 4/20/2017  Review Complete On: 4/20/2017 By: Mo Loop, LPN Severity Noted Reaction Type Reactions Pcn [Penicillins] High 04/07/2011    Anaphylaxis, Hives Other reaction(s): anaphylaxis/angioedema Venom-honey Bee High 01/24/2013   Side Effect Anaphylaxis Current Immunizations  Reviewed on 12/2/2014 Name Date Influenza High Dose Vaccine PF 9/16/2016 Influenza Vaccine Split 9/30/2011, 10/19/2010 11:07 AM  
 Pneumococcal Vaccine (Unspecified Type) 1/3/2012 TD Vaccine 10/19/2007 Tdap 12/2/2014  6:50 PM  
 Zoster 10/19/2008 Not reviewed this visit You Were Diagnosed With   
  
 Codes Comments Cough    -  Primary ICD-10-CM: N68 ICD-9-CM: 786.2 Sore throat     ICD-10-CM: J02.9 ICD-9-CM: 346 Sinus congestion     ICD-10-CM: R09.81 ICD-9-CM: 478.19 Vitals BP Pulse Temp Resp Height(growth percentile) Weight(growth percentile) 140/66 (BP 1 Location: Right arm, BP Patient Position: Sitting) 76 98 °F (36.7 °C) (Oral) 16 5' 8\" (1.727 m) 194 lb (88 kg) SpO2 BMI OB Status Smoking Status 97% 29.5 kg/m2 Postmenopausal Never Smoker Vitals History BMI and BSA Data Body Mass Index Body Surface Area  
 29.5 kg/m 2 2.05 m 2 Preferred Pharmacy Pharmacy Name Phone [facility] [phone] Your Updated Medication List  
  
   
This list is accurate as of: 4/20/17  9:37 AM.  Always use your most recent med list.  
  
  
  
  
 albuterol 90 mcg/actuation inhaler Commonly known as:  PROVENTIL HFA, VENTOLIN HFA, PROAIR HFA Take 2 Puffs by inhalation every six (6) hours as needed for Wheezing or Shortness of Breath. aspirin 325 mg tablet Commonly known as:  ASPIRIN Take 2 Tabs by mouth two (2) times a day. azithromycin 250 mg tablet Commonly known as:  Ellsworth Client Take 2 tablets today, then take 1 tablet daily  
  
 beclomethasone 80 mcg/actuation Aero Commonly known as:  QVAR Take 1 Puff by inhalation two (2) times a day. benzonatate 100 mg capsule Commonly known as:  TESSALON Take 1 Cap by mouth three (3) times daily as needed for Cough for up to 7 days. Blood-Glucose Meter monitoring kit Commonly known as:  March Landau Use to test blood sugar daily  
  
 chlorpheniramine-HYDROcodone 10-8 mg/5 mL suspension Commonly known as:  Kayla Min Take 5 mL by mouth every twelve (12) hours as needed for Cough. Max Daily Amount: 10 mL. ergocalciferol 50,000 unit capsule Commonly known as:  VITAMIN D2  
TAKE 1 CAP BY MOUTH EVERY WEEK  
  
 estradiol 0.1 mg/24 hr  
Commonly known as:  CLIMARA APPLY 1 PATCH TO SKIN EVERY MONDAY. fluticasone 50 mcg/actuation nasal spray Commonly known as:  Ala Lips 2 Sprays by Both Nostrils route daily. glucose blood VI test strips strip Commonly known as:  ONETOUCH ULTRA TEST Use to test blood sugar daily Lancets Misc Commonly known as: One Touch Ashtyn Larger Use to test blood sugar once daily Lancing Device with Lancets Kit Commonly known as: One Touch Ashtyn Larger Use to test blood sugar daily  
  
 metFORMIN  mg tablet Commonly known as:  GLUCOPHAGE XR  
TAKE ONE TABLET BY MOUTH EVERY DAY WITH DINNER  
  
 multivitamin tablet Commonly known as:  ONE A DAY Take 1 Tab by mouth daily. raNITIdine 150 mg tablet Commonly known as:  ZANTAC Take 1 Tab by mouth two (2) times a day. rOPINIRole 0.5 mg tablet Commonly known as:  Lidia Greek Take 1 Tab by mouth two (2) times daily as needed. Indications: Restless Legs Syndrome  
  
 simvastatin 20 mg tablet Commonly known as:  ZOCOR  
TAKE 1 TABLET BY MOUTH AT BEDTIME  
  
 traMADol 50 mg tablet Commonly known as:  ULTRAM  
TAKE ONE TABLET BY MOUTH EVERY EIGHT HOURS AS NEEDED FOR PAIN Prescriptions Sent to Pharmacy Refills  
 azithromycin (ZITHROMAX) 250 mg tablet 0 Sig: Take 2 tablets today, then take 1 tablet daily  Class: Normal  
 Pharmacy: [facility] [place] [address] Ph #: [phone]  
 benzonatate (TESSALON) 100 mg capsule 0  
 Sig: Take 1 Cap by mouth three (3) times daily as needed for Cough for up to 7 days. Class: Normal  
 Pharmacy: [id number] [address] [facility] #: [phone] Route: Oral  
  
Patient Instructions Cough: Care Instructions Your Care Instructions A cough is your body's response to something that bothers your throat or airways. Many things can cause a cough. You might cough because of a cold or the flu, bronchitis, or asthma. Smoking, postnasal drip, allergies, and stomach acid that backs up into your throat also can cause coughs. A cough is a symptom, not a disease. Most coughs stop when the cause, such as a cold, goes away. You can take a few steps at home to cough less and feel better. Follow-up care is a key part of your treatment and safety. Be sure to make and go to all appointments, and call your doctor if you are having problems. It's also a good idea to know your test results and keep a list of the medicines you take. How can you care for yourself at home? · Drink lots of water and other fluids. This helps thin the mucus and soothes a dry or sore throat. Honey or lemon juice in hot water or tea may ease a dry cough. · Take cough medicine as directed by your doctor. · Prop up your head on pillows to help you breathe and ease a dry cough. · Try cough drops to soothe a dry or sore throat. Cough drops don't stop a cough. Medicine-flavored cough drops are no better than candy-flavored drops or hard candy. · Do not smoke. Avoid secondhand smoke. If you need help quitting, talk to your doctor about stop-smoking programs and medicines. These can increase your chances of quitting for good. When should you call for help? Call 911 anytime you think you may need emergency care. For example, call if: 
· You have severe trouble breathing. Call your doctor now or seek immediate medical care if: 
· You cough up blood. · You have new or worse trouble breathing. · You have a new or higher fever. · You have a new rash. Watch closely for changes in your health, and be sure to contact your doctor if: 
· You cough more deeply or more often, especially if you notice more mucus or a change in the color of your mucus. · You have new symptoms, such as a sore throat, an earache, or sinus pain. · You do not get better as expected. Where can you learn more? Go to http://zandra-gerard.info/. Enter D279 in the search box to learn more about \"Cough: Care Instructions. \" Current as of: May 27, 2016 Content Version: 11.2 © 9389-5523 [employer]. Care instructions adapted under license by [employer] (which disclaims liability or warranty for this information). If you have questions about a medical condition or this instruction, always ask your healthcare professional. [address] any warranty or liability for your use of this information. Introducing [facility]! Dear Minnie Lazcano: Thank you for requesting a [email or web address] account. Our records indicate that you already have an active [email or web address] account. You can access your account anytime at [email or web address] Did you know that you can access your hospital and ER discharge instructions at any time in [email or web address]? You can also review all of your test results from your hospital stay or ER visit. Additional Information If you have questions, please visit the Frequently Asked Questions section of the [email or web address] website at [email or web address] Remember, [email or web address] is NOT to be used for urgent needs. For medical emergencies, dial 911. Now available from your iPhone and Android! Please provide this summary of care documentation to your next provider. Your primary care clinician is listed as [facility].  If you have any questions after today's visit, please call [phone].

## 2017-04-20 NOTE — PROGRESS NOTES
HISTORY OF PRESENT ILLNESS  Semaj Castrejon is a 70 y.o. female. Cough   The history is provided by the patient. This is a new problem. The current episode started more than 1 week ago. The problem occurs constantly. The problem has been gradually worsening. Associated symptoms include headaches. Pertinent negatives include no chest pain and no shortness of breath. Associated symptoms comments: Sputum production, chest tenderness while coughing, fever, chills. . Nothing aggravates the symptoms. Nothing relieves the symptoms. She has tried aspirin and Benadryl for the symptoms. The treatment provided no relief. Review of Systems   Constitutional: Positive for chills, fever and malaise/fatigue. HENT: Positive for congestion and sore throat. Negative for ear pain. Eyes: Negative for blurred vision and double vision. Respiratory: Positive for cough and sputum production. Negative for shortness of breath and wheezing. Cardiovascular: Negative for chest pain and palpitations. Gastrointestinal: Negative for heartburn, nausea and vomiting. Musculoskeletal: Negative for myalgias. Skin: Negative for itching and rash. Neurological: Positive for headaches. Negative for dizziness and tingling. Endo/Heme/Allergies: Negative for environmental allergies and polydipsia. Psychiatric/Behavioral: The patient is not nervous/anxious and does not have insomnia. Physical Exam   Constitutional: She is oriented to person, place, and time. She appears well-developed and well-nourished. No distress. HENT:   Head: Normocephalic and atraumatic. Right Ear: External ear normal.   Left Ear: External ear normal.   Nose: Mucosal edema and rhinorrhea present. Right sinus exhibits maxillary sinus tenderness and frontal sinus tenderness. Left sinus exhibits maxillary sinus tenderness and frontal sinus tenderness.    Mouth/Throat: Oropharyngeal exudate, posterior oropharyngeal edema and posterior oropharyngeal erythema present. No tonsillar abscesses. Eyes: Pupils are equal, round, and reactive to light. Neck: Neck supple. Cardiovascular: Regular rhythm. Pulmonary/Chest: Effort normal and breath sounds normal. No respiratory distress. She has no wheezes. Lymphadenopathy:     She has no cervical adenopathy. Neurological: She is alert and oriented to person, place, and time. No cranial nerve deficit. Skin: Skin is dry. She is not diaphoretic. Psychiatric: She has a normal mood and affect. Nursing note and vitals reviewed. ASSESSMENT and PLAN    ICD-10-CM ICD-9-CM    1. Cough R05 786.2 azithromycin (ZITHROMAX) 250 mg tablet      benzonatate (TESSALON) 100 mg capsule   2. Sore throat J02.9 462 azithromycin (ZITHROMAX) 250 mg tablet   3. Sinus congestion R09.81 478.19 azithromycin (ZITHROMAX) 250 mg tablet   Patient advised to take medication as prescribed. Take rest and plenty of fluids. Alternate tylenol and ibuprofen for fever. Return to clinic if condition worsens in next 72 hours.

## 2017-04-20 NOTE — PATIENT INSTRUCTIONS
Cough: Care Instructions  Your Care Instructions  A cough is your body's response to something that bothers your throat or airways. Many things can cause a cough. You might cough because of a cold or the flu, bronchitis, or asthma. Smoking, postnasal drip, allergies, and stomach acid that backs up into your throat also can cause coughs. A cough is a symptom, not a disease. Most coughs stop when the cause, such as a cold, goes away. You can take a few steps at home to cough less and feel better. Follow-up care is a key part of your treatment and safety. Be sure to make and go to all appointments, and call your doctor if you are having problems. It's also a good idea to know your test results and keep a list of the medicines you take. How can you care for yourself at home? · Drink lots of water and other fluids. This helps thin the mucus and soothes a dry or sore throat. Honey or lemon juice in hot water or tea may ease a dry cough. · Take cough medicine as directed by your doctor. · Prop up your head on pillows to help you breathe and ease a dry cough. · Try cough drops to soothe a dry or sore throat. Cough drops don't stop a cough. Medicine-flavored cough drops are no better than candy-flavored drops or hard candy. · Do not smoke. Avoid secondhand smoke. If you need help quitting, talk to your doctor about stop-smoking programs and medicines. These can increase your chances of quitting for good. When should you call for help? Call 911 anytime you think you may need emergency care. For example, call if:  · You have severe trouble breathing. Call your doctor now or seek immediate medical care if:  · You cough up blood. · You have new or worse trouble breathing. · You have a new or higher fever. · You have a new rash.   Watch closely for changes in your health, and be sure to contact your doctor if:  · You cough more deeply or more often, especially if you notice more mucus or a change in the color of your mucus. · You have new symptoms, such as a sore throat, an earache, or sinus pain. · You do not get better as expected. Where can you learn more? Go to http://zandra-gerard.info/. Enter D279 in the search box to learn more about \"Cough: Care Instructions. \"  Current as of: May 27, 2016  Content Version: 11.2  © 9681-3795 [employer]. Care instructions adapted under license by [employer] (which disclaims liability or warranty for this information). If you have questions about a medical condition or this instruction, always ask your healthcare professional. [address] any warranty or liability for your use of this information.

## 2017-04-20 NOTE — PROGRESS NOTES
Pt presented today with sore throat and coughing, with chest congestion. Has pt had any falls since last visit? No.  Pt preferred language for health care discussion is english. Advanced Directive? No    Is someone accompanying this pt? No    Is the patient using any DME equipment during OV? No      1. Have you been to the ER, urgent care clinic since your last visit? Hospitalized since your last visit? No    2. Have you seen or consulted any other health care providers outside of the [address] [place] [address] since your last visit? Include any pap smears or colon screening. No      Pt has a reminder for a \"due or due soon\" health maintenance. I have asked that she contact his primary care provider for follow-up on this health maintenance.

## 2017-06-12 DIAGNOSIS — Z76.0 MEDICATION REFILL: ICD-10-CM

## 2017-06-12 RX ORDER — SIMVASTATIN 20 MG/1
TABLET, FILM COATED ORAL
Qty: 30 TAB | Refills: 0 | Status: SHIPPED | OUTPATIENT
Start: 2017-06-12 | End: 2017-08-11 | Stop reason: SDUPTHER

## 2017-06-12 RX ORDER — OXYBUTYNIN CHLORIDE 15 MG/1
TABLET, EXTENDED RELEASE ORAL
Qty: 30 TAB | Refills: 0 | Status: SHIPPED | OUTPATIENT
Start: 2017-06-12 | End: 2018-01-29

## 2017-06-12 RX ORDER — ERGOCALCIFEROL 1.25 MG/1
CAPSULE ORAL
Qty: 12 CAP | Refills: 0 | Status: SHIPPED | OUTPATIENT
Start: 2017-06-12 | End: 2017-10-05 | Stop reason: SDUPTHER

## 2017-06-12 RX ORDER — METFORMIN HYDROCHLORIDE 500 MG/1
TABLET, EXTENDED RELEASE ORAL
Qty: 30 TAB | Refills: 0 | Status: SHIPPED | OUTPATIENT
Start: 2017-06-12 | End: 2017-08-11 | Stop reason: SDUPTHER

## 2017-07-31 ENCOUNTER — TELEPHONE (OUTPATIENT)
Dept: FAMILY MEDICINE CLINIC | Age: 72
End: 2017-07-31

## 2017-07-31 ENCOUNTER — DOCUMENTATION ONLY (OUTPATIENT)
Dept: FAMILY MEDICINE CLINIC | Age: 72
End: 2017-07-31

## 2017-07-31 NOTE — TELEPHONE ENCOUNTER
Called Pt and left a msg to callback. Please inform the Pt is due to schedule an appt for a wellness visit.      Please cal appointment as a

## 2017-08-11 ENCOUNTER — OFFICE VISIT (OUTPATIENT)
Dept: INTERNAL MEDICINE CLINIC | Age: 72
End: 2017-08-11

## 2017-08-11 ENCOUNTER — HOSPITAL ENCOUNTER (OUTPATIENT)
Dept: LAB | Age: 72
Discharge: HOME | End: 2017-08-11
Payer: MEDICARE

## 2017-08-11 VITALS
DIASTOLIC BLOOD PRESSURE: 67 MMHG | TEMPERATURE: 97.3 F | OXYGEN SATURATION: 96 % | SYSTOLIC BLOOD PRESSURE: 135 MMHG | RESPIRATION RATE: 18 BRPM | HEART RATE: 58 BPM | HEIGHT: 68 IN | BODY MASS INDEX: 28.67 KG/M2 | WEIGHT: 189.2 LBS

## 2017-08-11 DIAGNOSIS — E11.9 TYPE 2 DIABETES MELLITUS WITHOUT COMPLICATION, WITHOUT LONG-TERM CURRENT USE OF INSULIN (HCC): Chronic | ICD-10-CM

## 2017-08-11 DIAGNOSIS — Z13.39 SCREENING FOR ALCOHOLISM: ICD-10-CM

## 2017-08-11 DIAGNOSIS — G25.81 RLS (RESTLESS LEGS SYNDROME): ICD-10-CM

## 2017-08-11 DIAGNOSIS — E78.5 DYSLIPIDEMIA: Chronic | ICD-10-CM

## 2017-08-11 DIAGNOSIS — G89.29 OTHER CHRONIC PAIN: ICD-10-CM

## 2017-08-11 DIAGNOSIS — Z12.31 ENCOUNTER FOR SCREENING MAMMOGRAM FOR MALIGNANT NEOPLASM OF BREAST: ICD-10-CM

## 2017-08-11 DIAGNOSIS — Z76.0 MEDICATION REFILL: ICD-10-CM

## 2017-08-11 DIAGNOSIS — Z79.891 LONG TERM (CURRENT) USE OF OPIATE ANALGESIC: ICD-10-CM

## 2017-08-11 DIAGNOSIS — Z23 ENCOUNTER FOR IMMUNIZATION: ICD-10-CM

## 2017-08-11 DIAGNOSIS — Z00.00 ROUTINE GENERAL MEDICAL EXAMINATION AT A HEALTH CARE FACILITY: Primary | ICD-10-CM

## 2017-08-11 PROBLEM — R09.81 SINUS CONGESTION: Status: RESOLVED | Noted: 2017-04-20 | Resolved: 2017-08-11

## 2017-08-11 PROBLEM — J02.9 SORE THROAT: Status: RESOLVED | Noted: 2017-04-20 | Resolved: 2017-08-11

## 2017-08-11 LAB
ALBUMIN SERPL BCP-MCNC: 3.8 G/DL (ref 3.4–5)
ALBUMIN/GLOB SERPL: 1.2 {RATIO} (ref 0.8–1.7)
ALCOHOL UR POC: NORMAL
ALP SERPL-CCNC: 59 U/L (ref 45–117)
ALT SERPL-CCNC: 32 U/L (ref 13–56)
AMPHETAMINES UR POC: NEGATIVE
ANION GAP BLD CALC-SCNC: 7 MMOL/L (ref 3–18)
AST SERPL W P-5'-P-CCNC: 19 U/L (ref 15–37)
BARBITURATES UR POC: NEGATIVE
BENZODIAZEPINES UR POC: NEGATIVE
BILIRUB SERPL-MCNC: 0.3 MG/DL (ref 0.2–1)
BUN SERPL-MCNC: 12 MG/DL (ref 7–18)
BUN/CREAT SERPL: 16 (ref 12–20)
BUPRENORPHINE UR POC: NORMAL
CALCIUM SERPL-MCNC: 9.2 MG/DL (ref 8.5–10.1)
CANNABINOIDS UR POC: NEGATIVE
CARISOPRODOL UR POC: NORMAL
CHLORIDE SERPL-SCNC: 106 MMOL/L (ref 100–108)
CHOLEST SERPL-MCNC: 222 MG/DL
CO2 SERPL-SCNC: 26 MMOL/L (ref 21–32)
COCAINE UR POC: NEGATIVE
CREAT SERPL-MCNC: 0.76 MG/DL (ref 0.6–1.3)
FENTANYL UR POC: NORMAL
GLOBULIN SER CALC-MCNC: 3.3 G/DL (ref 2–4)
GLUCOSE SERPL-MCNC: 105 MG/DL (ref 74–99)
HBA1C MFR BLD: 6.5 % (ref 4.2–5.6)
HDLC SERPL-MCNC: 44 MG/DL (ref 40–60)
HDLC SERPL: 5 {RATIO} (ref 0–5)
LDLC SERPL CALC-MCNC: 126.2 MG/DL (ref 0–100)
LIPID PROFILE,FLP: ABNORMAL
MDMA/ECSTASY UR POC: NEGATIVE
METHADONE UR POC: NEGATIVE
METHAMPHETAMINE UR POC: NEGATIVE
METHYLPHENIDATE UR POC: NORMAL
OPIATES UR POC: NEGATIVE
OXYCODONE UR POC: NEGATIVE
PHENCYCLIDINE UR POC: NORMAL
POTASSIUM SERPL-SCNC: 3.8 MMOL/L (ref 3.5–5.5)
PROPOXYPHENE UR POC: NORMAL
PROT SERPL-MCNC: 7.1 G/DL (ref 6.4–8.2)
SODIUM SERPL-SCNC: 139 MMOL/L (ref 136–145)
TRAMADOL UR POC: NORMAL
TRICYCLICS UR POC: NORMAL
TRIGL SERPL-MCNC: 259 MG/DL (ref ?–150)
VLDLC SERPL CALC-MCNC: 51.8 MG/DL

## 2017-08-11 PROCEDURE — 83036 HEMOGLOBIN GLYCOSYLATED A1C: CPT | Performed by: INTERNAL MEDICINE

## 2017-08-11 PROCEDURE — 80053 COMPREHEN METABOLIC PANEL: CPT | Performed by: INTERNAL MEDICINE

## 2017-08-11 PROCEDURE — 80061 LIPID PANEL: CPT | Performed by: INTERNAL MEDICINE

## 2017-08-11 PROCEDURE — 82043 UR ALBUMIN QUANTITATIVE: CPT | Performed by: INTERNAL MEDICINE

## 2017-08-11 PROCEDURE — 36415 COLL VENOUS BLD VENIPUNCTURE: CPT | Performed by: INTERNAL MEDICINE

## 2017-08-11 RX ORDER — FLUTICASONE PROPIONATE 50 MCG
2 SPRAY, SUSPENSION (ML) NASAL DAILY
Qty: 1 BOTTLE | Refills: 5 | Status: SHIPPED | OUTPATIENT
Start: 2017-08-11

## 2017-08-11 RX ORDER — SIMVASTATIN 20 MG/1
TABLET, FILM COATED ORAL
Qty: 90 TAB | Refills: 5 | Status: SHIPPED | OUTPATIENT
Start: 2017-08-11 | End: 2018-01-29 | Stop reason: SDUPTHER

## 2017-08-11 RX ORDER — METFORMIN HYDROCHLORIDE 500 MG/1
TABLET, EXTENDED RELEASE ORAL
Qty: 90 TAB | Refills: 5 | Status: SHIPPED | OUTPATIENT
Start: 2017-08-11 | End: 2019-07-26 | Stop reason: SDUPTHER

## 2017-08-11 RX ORDER — ASPIRIN 325 MG
650 TABLET ORAL 2 TIMES DAILY
Qty: 90 TAB | Refills: 5 | Status: SHIPPED | OUTPATIENT
Start: 2017-08-11

## 2017-08-11 RX ORDER — ALBUTEROL SULFATE 90 UG/1
2 AEROSOL, METERED RESPIRATORY (INHALATION)
Qty: 1 INHALER | Refills: 5 | Status: SHIPPED | OUTPATIENT
Start: 2017-08-11 | End: 2018-02-12 | Stop reason: SDUPTHER

## 2017-08-11 RX ORDER — TRAMADOL HYDROCHLORIDE 50 MG/1
TABLET ORAL
Qty: 60 TAB | Refills: 5 | Status: SHIPPED | OUTPATIENT
Start: 2017-08-11 | End: 2019-06-18 | Stop reason: SDUPTHER

## 2017-08-11 RX ORDER — ESTRADIOL 0.1 MG/D
PATCH TRANSDERMAL
Qty: 12 PATCH | Refills: 5 | Status: SHIPPED | OUTPATIENT
Start: 2017-08-11 | End: 2017-09-30 | Stop reason: SDUPTHER

## 2017-08-11 RX ORDER — ROPINIROLE 0.5 MG/1
0.5 TABLET, FILM COATED ORAL
Qty: 180 TAB | Refills: 5 | Status: SHIPPED | OUTPATIENT
Start: 2017-08-11 | End: 2020-08-11 | Stop reason: SDUPTHER

## 2017-08-11 NOTE — MR AVS SNAPSHOT
Visit Information Date & Time Provider Department Dept. Phone Encounter #  
 8/11/2017 12:15 PM [address] [phone] [id number] Follow-up Instructions Return in about 4 months (around 12/11/2017) for HTN, DM, cholesterol. Upcoming Health Maintenance Date Due  
 FOOT EXAM Q1 12/2/2015 Pneumococcal 65+ Low/Medium Risk (2 of 2 - PPSV23) 1/3/2017 MICROALBUMIN Q1 7/18/2017 LIPID PANEL Q1 7/18/2017 MEDICARE YEARLY EXAM 7/19/2017 HEMOGLOBIN A1C Q6M 7/26/2017 INFLUENZA AGE 9 TO ADULT 9/15/2017* GLAUCOMA SCREENING Q2Y 11/9/2017* EYE EXAM RETINAL OR DILATED Q1 11/9/2017* BREAST CANCER SCRN MAMMOGRAM 8/2/2018 COLONOSCOPY 12/30/2018 DTaP/Tdap/Td series (2 - Td) 6/26/2026 *Topic was postponed. The date shown is not the original due date. Allergies as of 8/11/2017  Review Complete On: 8/11/2017 By: Felix Rao MD  
  
 Severity Noted Reaction Type Reactions Pcn [Penicillins] High 04/07/2011    Anaphylaxis, Hives Other reaction(s): anaphylaxis/angioedema Venom-honey Bee High 01/24/2013   Side Effect Anaphylaxis Current Immunizations  Reviewed on 12/2/2014 Name Date Influenza High Dose Vaccine PF 9/16/2016 Influenza Vaccine 11/26/2008 12:00 AM  
 Influenza Vaccine Split 9/30/2011, 10/19/2010 11:07 AM  
 Pneumococcal Conjugate (PCV-13)  Incomplete TD Vaccine 10/19/2007 Tdap 6/26/2016 12:00 AM, 12/2/2014  6:50 PM  
 ZZZ-RETIRED (DO NOT USE) Pneumococcal Vaccine (Unspecified Type) 1/3/2012 Zoster 10/19/2008 Not reviewed this visit You Were Diagnosed With   
  
 Codes Comments Routine general medical examination at a health care facility    -  Primary ICD-10-CM: Z00.00 ICD-9-CM: V70.0 Screening for alcoholism     ICD-10-CM: Z13.89 ICD-9-CM: V79.1 Encounter for immunization     ICD-10-CM: O46 ICD-9-CM: V03.89   
 Encounter for screening mammogram for malignant neoplasm of breast     ICD-10-CM: Z12.31 
ICD-9-CM: V76.12   
 RLS (restless legs syndrome)     ICD-10-CM: G25.81 ICD-9-CM: 333.94 Other chronic pain     ICD-10-CM: G89.29 ICD-9-CM: 338.29 Medication refill     ICD-10-CM: Z76.0 ICD-9-CM: V68.1 Type 2 diabetes mellitus without complication, without long-term current use of insulin (HCC)     ICD-10-CM: E11.9 ICD-9-CM: 250.00 Dyslipidemia     ICD-10-CM: E78.5 ICD-9-CM: 272.4 Long term (current) use of opiate analgesic     ICD-10-CM: F19.215 ICD-9-CM: V58.69 Vitals BP Pulse Temp Resp Height(growth percentile) Weight(growth percentile) 135/67 (BP 1 Location: Right arm, BP Patient Position: Sitting) (!) 58 97.3 °F (36.3 °C) (Oral) 18 5' 8\" (1.727 m) 189 lb 3.2 oz (85.8 kg) SpO2 BMI OB Status Smoking Status 96% 28.77 kg/m2 Postmenopausal Never Smoker Vitals History BMI and BSA Data Body Mass Index Body Surface Area 28.77 kg/m 2 2.03 m 2 Preferred Pharmacy Pharmacy Name [facility] [address] [place] [address] [phone] Your Updated Medication List  
  
   
This list is accurate as of: 8/11/17  1:10 PM.  Always use your most recent med list.  
  
  
  
  
 albuterol 90 mcg/actuation inhaler Commonly known as:  PROVENTIL HFA, VENTOLIN HFA, PROAIR HFA Take 2 Puffs by inhalation every six (6) hours as needed for Wheezing or Shortness of Breath. aspirin 325 mg tablet Commonly known as:  ASPIRIN Take 2 Tabs by mouth two (2) times a day. beclomethasone 80 mcg/actuation [employer] Commonly known as:  QVAR Take 1 Puff by inhalation two (2) times a day. Blood-Glucose Meter monitoring kit Commonly known as:  Trent Matta Use to test blood sugar daily  
  
 chlorpheniramine-HYDROcodone 10-8 mg/5 mL suspension Commonly known as:  Francis Judd Take 5 mL by mouth every twelve (12) hours as needed for Cough. Max Daily Amount: 10 mL.  
  
 estradiol 0.1 mg/24 hr  
Commonly known as:  CLIMARA APPLY 1 PATCH TO SKIN EVERY MONDAY. fluticasone 50 mcg/actuation nasal spray Commonly known as:  Nicole Lacks 2 Sprays by Both Nostrils route daily. Indications: ALLERGIC RHINITIS  
  
 glucose blood VI test strips strip Commonly known as:  ONETOUCH ULTRA TEST Use to test blood sugar daily Lancets Misc Commonly known as: One Touch Mahala Class Use to test blood sugar once daily Lancing Device with Lancets Kit Commonly known as: One Touch Mahala Class Use to test blood sugar daily  
  
 metFORMIN  mg tablet Commonly known as:  GLUCOPHAGE XR  
TAKE ONE TABLET BY MOUTH ONCE DAILY WITH SUPPER  
  
 multivitamin tablet Commonly known as:  ONE A DAY Take 1 Tab by mouth daily. oxybutynin chloride XL 15 mg CR tablet Commonly known as:  DITROPAN XL  
TAKE ONE TABLET BY MOUTH ONCE DAILY  
  
 raNITIdine 150 mg tablet Commonly known as:  ZANTAC Take 1 Tab by mouth two (2) times a day. rOPINIRole 0.5 mg tablet Commonly known as:  Fountain Crumbly Take 1 Tab by mouth two (2) times daily as needed. Indications: Restless Legs Syndrome  
  
 simvastatin 20 mg tablet Commonly known as:  ZOCOR  
TAKE ONE TABLET BY MOUTH AT BEDTIME  
  
 traMADol 50 mg tablet Commonly known as:  ULTRAM  
TAKE ONE TABLET BY MOUTH EVERY EIGHT HOURS AS NEEDED FOR PAIN  Indications: Pain VITAMIN D2 50,000 unit capsule Generic drug:  ergocalciferol TAKE ONE CAPSULE BY MOUTH ONCE A WEEK Prescriptions Printed Refills  
 traMADol (ULTRAM) 50 mg tablet 5 Sig: TAKE ONE TABLET BY MOUTH EVERY EIGHT HOURS AS NEEDED FOR PAIN  Indications: Pain Class: Print Prescriptions Sent to Pharmacy Refills  
 simvastatin (ZOCOR) 20 mg tablet 5 Sig: TAKE ONE TABLET BY MOUTH AT BEDTIME  Class: Normal  
 Pharmacy: [address], [address] [address]. Ph #: [phone]  
 metFORMIN ER (GLUCOPHAGE XR) 500 mg tablet 5 Sig: TAKE ONE TABLET BY MOUTH ONCE DAILY WITH SUPPER Class: Normal  
 Pharmacy: [address] [place] [address]. [address] [place] [address], [address] [address]. Ph #: [phone]  
 fluticasone (FLONASE) 50 mcg/actuation nasal spray 5 Si Sprays by Both Nostrils route daily. Indications: ALLERGIC RHINITIS Class: Normal  
 Pharmacy: [address] [place] [address]. [address] [place] [address], [address] [address]. Ph #: [phone] Route: Both Nostrils  
 rOPINIRole (REQUIP) 0.5 mg tablet 5 Sig: Take 1 Tab by mouth two (2) times daily as needed. Indications: Restless Legs Syndrome Class: Normal  
 Pharmacy: [address] [place] [address]. [address] [place] [address], [address] [address]. Ph #: [phone] Route: Oral  
 estradiol (CLIMARA) 0.1 mg/24 hr 5 Sig: APPLY 1 PATCH TO SKIN EVERY MONDAY. Class: Normal  
 Pharmacy: [address] [place] [address]. [address] [place] [address], [address] [address]. Ph #: [phone]  
 aspirin (ASPIRIN) 325 mg tablet 5 Sig: Take 2 Tabs by mouth two (2) times a day. Class: Normal  
 Pharmacy: [address] [place] [address]. [address] [place] [address], [address] [address]. Ph #: [phone] Route: Oral  
 albuterol (PROVENTIL HFA, VENTOLIN HFA, PROAIR HFA) 90 mcg/actuation inhaler 5 Sig: Take 2 Puffs by inhalation every six (6) hours as needed for Wheezing or Shortness of Breath. Class: Normal  
 Pharmacy: [address] [place] [address]. [address] [place] [address], [address] [address]. Ph #: [phone] Route: Inhalation We Performed the Following ADMIN PNEUMOCOCCAL VACCINE [ [facility]] AMB POC DRUG SCREEN () [ [facility]] PNEUMOCOCCAL CONJ VACCINE 13 VALENT IM [id number] CPT(R)] Follow-up Instructions Return in about 4 months (around 2017) for HTN, DM, cholesterol. To-Do List   
 2017 Lab:  HEMOGLOBIN A1C W/O EAG   
  
 2017 Lab: LIPID PANEL   
  
 08/11/2017 Lab:  METABOLIC PANEL, COMPREHENSIVE   
  
 08/11/2017 Lab:  MICROALBUMIN, UR, RAND W/ MICROALBUMIN/CREA RATIO   
  
 08/14/2017 Imaging:  [facility] MAMMO BI SCREENING INCL CAD Patient Instructions Schedule of Personalized Health Plan (Provide Copy to Patient) The best way to stay healthy is to live a healthy lifestyle. A healthy lifestyle includes regular exercise, eating a well-balanced diet, keeping a healthy weight and not smoking. Regular physical exams and screening tests are another important way to take care of yourself. Preventive exams provided by health care providers can find health problems early when treatment works best and can keep you from getting certain diseases or illnesses. Preventive services include exams, lab tests, screenings, shots, monitoring and information to help you take care of your own health. All people over 65 should have a pneumonia shot. Pneumonia shots are usually only needed once in a lifetime unless your doctor decides differently. All people over 65 should have a yearly flu shot. People over 65 are at medium to high risk for Hepatitis B. Three shots are needed for complete protection. In addition to your physical exam, some screening tests are recommended: 
 
Bone mass measurement (dexa scan) is recommended every two years Diabetes Mellitus screening is recommended every year. Glaucoma is an eye disease caused by high pressure in the eye. An eye exam is recommended every year. Cardiovascular screening tests that check your cholesterol and other blood fat (lipid) levels are recommended every five years. Colorectal Cancer screening tests help to find pre-cancerous polyps (growths in the colon) so they can be removed before they turn into cancer. Tests ordered for screening depend on your personal and family history risk factors. Screening for Breast Cancer is recommended yearly with a mammogram. 
 
Screening for Cervical Cancer is recommended every two years (annually for certain risk factors, such as previous history of STD or abnormal PAP in past 7 years), with a Pelvic Exam with PAP Here is a list of your current Health Maintenance items with a due date: 
Health Maintenance Topic Date Due  
 FOOT EXAM Q1  12/02/2015  Pneumococcal 65+ Low/Medium Risk (2 of 2 - PPSV23) 01/03/2017  MICROALBUMIN Q1  07/18/2017  LIPID PANEL Q1  07/18/2017  MEDICARE YEARLY EXAM  07/19/2017  
 HEMOGLOBIN A1C Q6M  07/26/2017  INFLUENZA AGE 9 TO ADULT  09/15/2017 (Originally 8/1/2017)  GLAUCOMA SCREENING Q2Y  11/09/2017 (Originally 10/13/2010)  EYE EXAM RETINAL OR DILATED Q1  11/09/2017 (Originally 10/13/1955)  BREAST CANCER SCRN MAMMOGRAM  08/02/2018  COLONOSCOPY  12/30/2018  DTaP/Tdap/Td series (2 - Td) 06/26/2026  Hepatitis C Screening  Completed  OSTEOPOROSIS SCREENING (DEXA)  Completed  ZOSTER VACCINE AGE 60>  Completed Introducing [facility]! Dear Carolynn Cruz: Thank you for requesting a [employer] account. Our records indicate that you already have an active [employer] account. You can access your account anytime at [email or web address] Did you know that you can access your hospital and ER discharge instructions at any time in [employer]? You can also review all of your test results from your hospital stay or ER visit. Additional Information If you have questions, please visit the Frequently Asked Questions section of the [employer] website at [email or web address] Remember, [employer] is NOT to be used for urgent needs. For medical emergencies, dial 911. Now available from your iPhone and Android! Please provide this summary of care documentation to your next provider. Your primary care clinician is listed as [facility].  If you have any questions after today's visit, please call [phone].

## 2017-08-11 NOTE — PROGRESS NOTES
ROOM # [id number] [facility] presents today for   Chief Complaint   Patient presents with    Annual Wellness Visit    Medication Refill     Requested Prescriptions     Pending Prescriptions Disp Refills    simvastatin (ZOCOR) 20 mg tablet 30 Tab 0    metFORMIN ER (GLUCOPHAGE XR) 500 mg tablet 30 Tab 0    fluticasone (FLONASE) 50 mcg/actuation nasal spray 1 Bottle 1     Si Sprays by Both Nostrils route daily.  rOPINIRole (REQUIP) 0.5 mg tablet 60 Tab 5     Sig: Take 1 Tab by mouth two (2) times daily as needed. Indications: Restless Legs Syndrome    estradiol (CLIMARA) 0.1 mg/24 hr 12 Patch 4     Sig: APPLY 1 PATCH TO SKIN EVERY MONDAY.  aspirin (ASPIRIN) 325 mg tablet 90 Tab 5     Sig: Take 2 Tabs by mouth two (2) times a day.  albuterol (PROVENTIL HFA, VENTOLIN HFA, PROAIR HFA) 90 mcg/actuation inhaler 1 Inhaler 5     Sig: Take 2 Puffs by inhalation every six (6) hours as needed for Wheezing or Shortness of Breath.  traMADol (ULTRAM) 50 mg tablet 60 Tab 0     Sig: TAKE ONE TABLET BY MOUTH EVERY EIGHT HOURS AS NEEDED FOR PAIN         Clif Yuan preferred language for health care discussion is english/other.     Is someone accompanying this pt? no    Is the patient using any DME equipment during OV? no    Depression Screening:  PHQ over the last two weeks 2017 2017 3/25/2017 2017 2017 2016 2016   PHQ Not Done - - - Active Diagnosis of Depression or Bipolar Disorder - - -   Little interest or pleasure in doing things Not at all Not at all Not at all - Not at all Not at all Not at all   Feeling down, depressed or hopeless Not at all Not at all Not at all - Not at all Not at all Not at all   Total Score PHQ 2 0 0 0 - 0 0 0       Learning Assessment:  Learning Assessment 2014   PRIMARY LEARNER Patient Patient Patient   HIGHEST LEVEL OF EDUCATION - PRIMARY LEARNER  - GRADUATED HIGH SCHOOL OR GED -   BARRIERS PRIMARY LEARNER NONE NONE -   PRIMARY LANGUAGE ENGLISH ENGLISH ENGLISH   LEARNER PREFERENCE PRIMARY READING LISTENING DEMONSTRATION     - READING -     - DEMONSTRATION -   ANSWERED BY pat Patient -   RELATIONSHIP SELF SELF -       Abuse Screening:  Abuse Screening Questionnaire 7/22/2015   Do you ever feel afraid of your partner? N   Are you in a relationship with someone who physically or mentally threatens you? N   Is it safe for you to go home? Y       Fall Risk  Fall Risk Assessment, last 12 mths 8/11/2017 4/20/2017 3/25/2017 2/23/2017 2/17/2017 9/16/2016 7/18/2016   Able to walk? Yes Yes Yes Yes Yes Yes Yes   Fall in past 12 months? No No No No No No No       Health Maintenance reviewed and discussed per provider. Yes    Maryjane Hair is due for MULTIPLE, see hm due. Please order/place referral if appropriate. Advance Directive:  1. Do you have an advance directive in place? Patient Reply: no    2. If not, would you like material regarding how to put one in place? Patient Reply: no    Coordination of Care:  1. Have you been to the ER, urgent care clinic since your last visit? Hospitalized since your last visit? no    2. Have you seen or consulted any other health care providers outside of the [address] [place] [address] since your last visit? Include any pap smears or colon screening.  Dr. Florence Wagner for elbow and shoulder

## 2017-08-11 NOTE — PATIENT INSTRUCTIONS
Schedule of Personalized Health Plan  (Provide Copy to Patient)  The best way to stay healthy is to live a healthy lifestyle. A healthy lifestyle includes regular exercise, eating a well-balanced diet, keeping a healthy weight and not smoking. Regular physical exams and screening tests are another important way to take care of yourself. Preventive exams provided by health care providers can find health problems early when treatment works best and can keep you from getting certain diseases or illnesses. Preventive services include exams, lab tests, screenings, shots, monitoring and information to help you take care of your own health. All people over 65 should have a pneumonia shot. Pneumonia shots are usually only needed once in a lifetime unless your doctor decides differently. All people over 65 should have a yearly flu shot. People over 65 are at medium to high risk for Hepatitis B. Three shots are needed for complete protection. In addition to your physical exam, some screening tests are recommended:    Bone mass measurement (dexa scan) is recommended every two years  Diabetes Mellitus screening is recommended every year. Glaucoma is an eye disease caused by high pressure in the eye. An eye exam is recommended every year. Cardiovascular screening tests that check your cholesterol and other blood fat (lipid) levels are recommended every five years. Colorectal Cancer screening tests help to find pre-cancerous polyps (growths in the colon) so they can be removed before they turn into cancer. Tests ordered for screening depend on your personal and family history risk factors.     Screening for Breast Cancer is recommended yearly with a mammogram.    Screening for Cervical Cancer is recommended every two years (annually for certain risk factors, such as previous history of STD or abnormal PAP in past 7 years), with a Pelvic Exam with PAP    Here is a list of your current Health Maintenance items with a due date:  Health Maintenance   Topic Date Due    FOOT EXAM Q1  12/02/2015    Pneumococcal 65+ Low/Medium Risk (2 of 2 - PPSV23) 01/03/2017    MICROALBUMIN Q1  07/18/2017    LIPID PANEL Q1  07/18/2017    MEDICARE YEARLY EXAM  07/19/2017    HEMOGLOBIN A1C Q6M  07/26/2017    INFLUENZA AGE 9 TO ADULT  09/15/2017 (Originally 8/1/2017)    GLAUCOMA SCREENING Q2Y  11/09/2017 (Originally 10/13/2010)    EYE EXAM RETINAL OR DILATED Q1  11/09/2017 (Originally 10/13/1955)    BREAST CANCER SCRN MAMMOGRAM  08/02/2018    COLONOSCOPY  12/30/2018    DTaP/Tdap/Td series (2 - Td) 06/26/2026    Hepatitis C Screening  Completed    OSTEOPOROSIS SCREENING (DEXA)  Completed    ZOSTER VACCINE AGE 60>  Completed

## 2017-08-11 NOTE — PROGRESS NOTES
HISTORY OF PRESENT ILLNESS  Ty Lake is a 70 y.o. female. Visit Vitals    /67 (BP 1 Location: Right arm, BP Patient Position: Sitting)    Pulse (!) 58    Temp 97.3 °F (36.3 °C) (Oral)    Resp 18    Ht 5' 8\" (1.727 m)    Wt 189 lb 3.2 oz (85.8 kg)    SpO2 96%    BMI 28.77 kg/m2       Diabetes   The history is provided by the patient. This is a chronic problem. The current episode started more than 1 week ago. The problem occurs daily. The problem has not changed since onset. Pertinent negatives include no chest pain and no headaches. Exacerbated by: diet. The symptoms are relieved by medications (diewt). Treatments tried: metformin. The treatment provided moderate relief. Cholesterol Problem   The history is provided by the patient. This is a chronic problem. The current episode started more than 1 week ago. The problem occurs daily. The problem has not changed since onset. Pertinent negatives include no chest pain and no headaches. Exacerbated by: diet. Relieved by: diet. Review of Systems   Constitutional: Negative. Cardiovascular: Negative for chest pain and palpitations. Genitourinary: Negative for frequency and urgency. Skin:        Some skin lesions   Neurological: Negative for dizziness and headaches. Endo/Heme/Allergies: Negative for polydipsia. Physical Exam   Constitutional: She is oriented to person, place, and time. She appears well-developed and well-nourished. No distress. Cardiovascular: Normal rate and regular rhythm. Pulmonary/Chest: Effort normal and breath sounds normal.   Musculoskeletal: She exhibits no edema. Neurological: She is alert and oriented to person, place, and time.    Diabetic foot exam:     Left: Reflexes 2+     Vibratory sensation normal    Proprioception normal   Sharp/dull discrimination     Filament test normal sensation with micro filament   Pulse DP: 2+ (normal)   Pulse PT:    Deformities: Mild - bunion with pressure points    Right: Reflexes 2+   Vibratory sensation normal   Proprioception normal   Sharp/dull discrimination    Filament test normal sensation with micro filament   Pulse DP: 2+ (normal)   Pulse PT:    Deformities: Mild - bunion with pressure points     Skin: Skin is warm and dry. She is not diaphoretic. Psychiatric: She has a normal mood and affect. Nursing note and vitals reviewed. ASSESSMENT and PLAN    ICD-10-CM ICD-9-CM                                         RLS (restless legs syndrome) G25.81 333.94     Other chronic pain G89.29 338.29     Medication refill Z76.0 V68.1 simvastatin (ZOCOR) 20 mg tablet      metFORMIN ER (GLUCOPHAGE XR) 500 mg tablet      fluticasone (FLONASE) 50 mcg/actuation nasal spray      rOPINIRole (REQUIP) 0.5 mg tablet      estradiol (CLIMARA) 0.1 mg/24 hr      aspirin (ASPIRIN) 325 mg tablet      albuterol (PROVENTIL HFA, VENTOLIN HFA, PROAIR HFA) 90 mcg/actuation inhaler      traMADol (ULTRAM) 50 mg tablet    Type 2 diabetes mellitus without complication, without long-term current use of insulin ([facility]) J97.2 168.88 METABOLIC PANEL, COMPREHENSIVE      HEMOGLOBIN A1C W/O EAG      MICROALBUMIN, UR, RAND W/ MICROALBUMIN/CREA RATIO    Dyslipidemia E78.5 272.4 LIPID PANEL    Long term (current) use of opiate analgesic Z79.891 V58.69 AMB POC DRUG SCREEN ()       BP good    Discussed BMI/weight, lifestyle, diet and exercise  Weight down 5#--she just does not have as much appetite as she used to. Eats good breakfast. Rest of day depends on hunger. Staying busy--with home improvements, assisting with personal care of a friend. Refill tramadol. UDS neg,   reviewed and appropriate activity noted. No adverse activity noted. Contract signed.     F/u 4 months, HTN, DM, CHOL
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
## 2017-08-12 LAB
CREAT UR-MCNC: 66.9 MG/DL (ref 30–125)
MICROALBUMIN UR-MCNC: 0.3 MG/DL (ref 0–3)
MICROALBUMIN/CREAT UR-RTO: 4 MG/G (ref 0–30)

## 2017-09-07 DIAGNOSIS — Z12.31 ENCOUNTER FOR SCREENING MAMMOGRAM FOR MALIGNANT NEOPLASM OF BREAST: ICD-10-CM

## 2017-09-30 DIAGNOSIS — Z76.0 MEDICATION REFILL: ICD-10-CM

## 2017-10-02 RX ORDER — ESTRADIOL 0.1 MG/D
PATCH TRANSDERMAL
Qty: 12 PATCH | Refills: 5 | Status: SHIPPED | OUTPATIENT
Start: 2017-10-02 | End: 2017-10-05 | Stop reason: SDUPTHER

## 2017-10-05 DIAGNOSIS — Z76.0 MEDICATION REFILL: ICD-10-CM

## 2017-10-05 RX ORDER — ERGOCALCIFEROL 1.25 MG/1
CAPSULE ORAL
Qty: 12 CAP | Refills: 5 | Status: SHIPPED | OUTPATIENT
Start: 2017-10-05 | End: 2018-11-19 | Stop reason: SDUPTHER

## 2017-10-05 RX ORDER — ESTRADIOL 0.1 MG/D
PATCH TRANSDERMAL
Qty: 12 PATCH | Refills: 5 | Status: SHIPPED | OUTPATIENT
Start: 2017-10-05 | End: 2018-11-19 | Stop reason: SDUPTHER

## 2017-10-05 NOTE — TELEPHONE ENCOUNTER
Requested Prescriptions     Pending Prescriptions Disp Refills    estradiol (CLIMARA) 0.1 mg/24 hr 12 Patch 5    ergocalciferol (VITAMIN D2) 50,000 unit capsule 12 Cap 0

## 2017-11-07 ENCOUNTER — OFFICE VISIT (OUTPATIENT)
Dept: INTERNAL MEDICINE CLINIC | Age: 72
End: 2017-11-07

## 2017-11-07 VITALS
BODY MASS INDEX: 28.55 KG/M2 | OXYGEN SATURATION: 98 % | HEART RATE: 64 BPM | RESPIRATION RATE: 18 BRPM | WEIGHT: 188.4 LBS | DIASTOLIC BLOOD PRESSURE: 67 MMHG | HEIGHT: 68 IN | TEMPERATURE: 96.3 F | SYSTOLIC BLOOD PRESSURE: 134 MMHG

## 2017-11-07 DIAGNOSIS — R21 SKIN MACULE OR MACULAR RASH: Primary | ICD-10-CM

## 2017-11-07 NOTE — PROGRESS NOTES
HISTORY OF PRESENT ILLNESS  Alejandro Tyler is a 67 y.o. female. Visit Vitals    /67 (BP 1 Location: Left arm, BP Patient Position: Sitting)    Pulse 64    Temp 96.3 °F (35.7 °C) (Oral)    Resp 18    Ht 5' 8\" (1.727 m)    Wt 188 lb 6.4 oz (85.5 kg)    SpO2 98%    BMI 28.65 kg/m2       HPI Comments: Spots on R lateral wrist. Denies contact exposure. Used neosporin to help stop itching. She also notes a spot on back  No fever or chills. No sweling    Skin Cancer   The history is provided by the patient. The current episode started more than 1 week ago. Review of Systems   Constitutional: Negative for chills and fever. Skin: Positive for itching and rash. Physical Exam   Constitutional: She is oriented to person, place, and time. She appears well-developed and well-nourished. No distress. Neurological: She is alert and oriented to person, place, and time. Skin: Skin is warm and dry. She is not diaphoretic. Red macules on right thenar region. Psychiatric: She has a normal mood and affect. Nursing note and vitals reviewed. ASSESSMENT and PLAN    ICD-10-CM ICD-9-CM    1. Skin macule or macular rash R21 782.1 ESOMEPRAZOLE MAGNESIUM (NEXIUM PO)      REFERRAL TO DERMATOLOGY       Likely inflamed nodules. ?  Contact derm  Refer to derm    F/u 6 weeks for combo clinic

## 2017-11-07 NOTE — MR AVS SNAPSHOT
Visit Information Date & Time Provider Department Dept. Phone Encounter #  
 11/7/2017  8:30 AM Edin Terry [address] [phone] [id number] Follow-up Instructions Return in about 6 weeks (around 12/19/2017) for HTN, DM. Your Appointments 11/28/2017 11:00 AM  
New Patient with Abel [facility]. Neel Cohen [address] ([facility]) Appt Note: NP MCR, [employer] appt & nppk mailed [address] 2 [phone]  
  
    
 12/5/2017 10:30 AM  
PHYSICAL THERAPY with Abel Call[facility]. Neel Cohen [address] ([facility]) Appt Note: Rai CHENEY [address] [address]  
[phone]  
  
   
 [place] [id number] Upcoming Health Maintenance Date Due  
 FOOT EXAM Q1 12/2/2015 INFLUENZA AGE 9 TO ADULT 8/1/2017 GLAUCOMA SCREENING Q2Y 11/9/2017* EYE EXAM RETINAL OR DILATED Q1 11/9/2017* HEMOGLOBIN A1C Q6M 2/11/2018 MICROALBUMIN Q1 8/11/2018 LIPID PANEL Q1 8/11/2018 MEDICARE YEARLY EXAM 8/12/2018 COLONOSCOPY 12/30/2018 BREAST CANCER SCRN MAMMOGRAM 8/24/2019 DTaP/Tdap/Td series (2 - Td) 6/26/2026 *Topic was postponed. The date shown is not the original due date. Allergies as of 11/7/2017  Review Complete On: 11/7/2017 By: Edin Terry MD  
  
 Severity Noted Reaction Type Reactions Pcn [Penicillins] High 04/07/2011    Anaphylaxis, Hives Other reaction(s): anaphylaxis/angioedema Venom-honey Bee High 01/24/2013   Side Effect Anaphylaxis Current Immunizations  Reviewed on 11/7/2017 Name Date Influenza High Dose Vaccine PF 9/16/2016 Influenza Vaccine 11/26/2008 12:00 AM  
 Influenza Vaccine Split 9/30/2011, 10/19/2010 11:07 AM  
 Pneumococcal Conjugate (PCV-13) 8/11/2017 TD Vaccine 10/19/2007 Tdap 6/26/2016 12:00 AM, 12/2/2014  6:50 PM  
 ZZZ-RETIRED (DO NOT USE) Pneumococcal Vaccine (Unspecified Type) 1/3/2012 Zoster 10/19/2008 Reviewed by Yayo Hoskins LPN on 35/1/9698 at  8:11 AM  
 Reviewed by Yayo Hoskins LPN on 67/3/5775 at  8:11 AM  
 Reviewed by Yayo Hoskins LPN on 05/9/2338 at  8:11 AM  
 Reviewed by Yayo Hoskins LPN on 63/9/9828 at  8:11 AM  
You Were Diagnosed With   
  
 Codes Comments Skin macule or macular rash    -  Primary ICD-10-CM: R21 
ICD-9-CM: 782.1 Vitals BP Pulse Temp Resp Height(growth percentile) Weight(growth percentile) 134/67 (BP 1 Location: Left arm, BP Patient Position: Sitting) 64 96.3 °F (35.7 °C) (Oral) 18 5' 8\" (1.727 m) 188 lb 6.4 oz (85.5 kg) SpO2 BMI OB Status Smoking Status 98% 28.65 kg/m2 Postmenopausal Never Smoker Vitals History BMI and BSA Data Body Mass Index Body Surface Area  
 28.65 kg/m 2 2.03 m 2 Preferred Pharmacy Pharmacy Name [facility] [address] [place] [address] [phone] Your Updated Medication List  
  
   
This list is accurate as of: 11/7/17  8:31 AM.  Always use your most recent med list.  
  
  
  
  
 albuterol 90 mcg/actuation inhaler Commonly known as:  PROVENTIL HFA, VENTOLIN HFA, PROAIR HFA Take 2 Puffs by inhalation every six (6) hours as needed for Wheezing or Shortness of Breath. aspirin 325 mg tablet Commonly known as:  ASPIRIN Take 2 Tabs by mouth two (2) times a day. beclomethasone 80 mcg/actuation [employer] Commonly known as:  QVAR Take 1 Puff by inhalation two (2) times a day. Blood-Glucose Meter monitoring kit Commonly known as:  Michael Romo Use to test blood sugar daily  
  
 chlorpheniramine-HYDROcodone 10-8 mg/5 mL suspension Commonly known as:  James Cheeks Take 5 mL by mouth every twelve (12) hours as needed for Cough. Max Daily Amount: 10 mL. ergocalciferol 50,000 unit capsule Commonly known as:  VITAMIN D2  
TAKE ONE CAPSULE BY MOUTH ONCE A WEEK  
  
 estradiol 0.1 mg/24 hr  
Commonly known as:  CLIMARA APPLY ONE PATCH TOPICALLY ONCE A WEEK ON  MONDAY  
  
 fluticasone 50 mcg/actuation nasal spray Commonly known as:  [place] 2 Sprays by Both Nostrils route daily. Indications: ALLERGIC RHINITIS  
  
 glucose blood VI test strips strip Commonly known as:  ONETOUCH ULTRA TEST Use to test blood sugar daily Lancets Misc Commonly known as: One Touch Wanna Boy Use to test blood sugar once daily Lancing Device with Lancets Kit Commonly known as: One Touch Wanna Boy Use to test blood sugar daily  
  
 metFORMIN  mg tablet Commonly known as:  GLUCOPHAGE XR  
TAKE ONE TABLET BY MOUTH ONCE DAILY WITH SUPPER  
  
 multivitamin tablet Commonly known as:  ONE A DAY Take 1 Tab by mouth daily. NEXIUM PO Take  by mouth as needed. oxybutynin chloride XL 15 mg CR tablet Commonly known as:  DITROPAN XL  
TAKE ONE TABLET BY MOUTH ONCE DAILY  
  
 raNITIdine 150 mg tablet Commonly known as:  ZANTAC Take 1 Tab by mouth two (2) times a day. rOPINIRole 0.5 mg tablet Commonly known as:  [place] Take 1 Tab by mouth two (2) times daily as needed. Indications: Restless Legs Syndrome  
  
 simvastatin 20 mg tablet Commonly known as:  ZOCOR  
TAKE ONE TABLET BY MOUTH AT BEDTIME  
  
 traMADol 50 mg tablet Commonly known as:  ULTRAM  
TAKE ONE TABLET BY MOUTH EVERY EIGHT HOURS AS NEEDED FOR PAIN  Indications: Pain We Performed the Following REFERRAL TO DERMATOLOGY [REF19 Custom] Comments:  
 [facility] Follow-up Instructions Return in about 6 weeks (around 12/19/2017) for HTN, DM. Referral Information Referral ID Referred By Referred To  
  
 [id number] Debo Rojo [facility] Not Available Visits Status Start Date End Date 1 New Request 11/7/17 11/7/18 If your referral has a status of pending review or denied, additional information will be sent to support the outcome of this decision. Introducing [facility]! Dear Dre Paredes: Thank you for requesting a [employer] account. Our records indicate that you already have an active [employer] account. You can access your account anytime at [email or web address] Did you know that you can access your hospital and ER discharge instructions at any time in [employer]? You can also review all of your test results from your hospital stay or ER visit. Additional Information If you have questions, please visit the Frequently Asked Questions section of the [employer] website at [email or web address] Remember, [employer] is NOT to be used for urgent needs. For medical emergencies, dial 911. Now available from your iPhone and Android! Please provide this summary of care documentation to your next provider. Your primary care clinician is listed as [facility]. If you have any questions after today's visit, please call [phone].

## 2017-11-07 NOTE — PROGRESS NOTES
Gisel Park presents today for   Chief Complaint   Patient presents with    Skin Cancer     poss skin ca per pt. (marks x4 that have changed on wrist/arm)       Gisel Park preferred language for health care discussion is english/other. Is someone accompanying this pt? no    Is the patient using any DME equipment during OV? no    Depression Screening:  PHQ over the last two weeks 11/7/2017 8/11/2017 4/20/2017 3/25/2017 2/23/2017 2/17/2017 9/16/2016   PHQ Not Done - - - - Active Diagnosis of Depression or Bipolar Disorder - -   Little interest or pleasure in doing things Not at all Not at all Not at all Not at all - Not at all Not at all   Feeling down, depressed or hopeless Not at all Not at all Not at all Not at all - Not at all Not at all   Total Score PHQ 2 0 0 0 0 - 0 0       Learning Assessment:  Learning Assessment 1/22/2015 11/21/2014 4/4/2014   PRIMARY LEARNER Patient Patient Patient   HIGHEST LEVEL OF EDUCATION - PRIMARY LEARNER  - GRADUATED HIGH SCHOOL OR GED -   BARRIERS PRIMARY LEARNER NONE NONE -   PRIMARY LANGUAGE ENGLISH ENGLISH ENGLISH   LEARNER PREFERENCE PRIMARY READING LISTENING DEMONSTRATION     - READING -     - DEMONSTRATION -   ANSWERED BY pat Patient -   RELATIONSHIP SELF SELF -       Abuse Screening:  Abuse Screening Questionnaire 7/22/2015   Do you ever feel afraid of your partner? N   Are you in a relationship with someone who physically or mentally threatens you? N   Is it safe for you to go home? Y       Fall Risk  Fall Risk Assessment, last 12 mths 11/7/2017 8/11/2017 4/20/2017 3/25/2017 2/23/2017 2/17/2017 9/16/2016   Able to walk? Yes Yes Yes Yes Yes Yes Yes   Fall in past 12 months? No No No No No No No       Health Maintenance reviewed and discussed per provider. Yes    Gisel Park is due for foot exam, influenza vax. Please order/place referral if appropriate. Advance Directive:  1. Do you have an advance directive in place?  Patient Reply: no    2. If not, would you like material regarding how to put one in place? Patient Reply: no    Coordination of Care:  1. Have you been to the ER, urgent care clinic since your last visit? Hospitalized since your last visit? no    2. Have you seen or consulted any other health care providers outside of the [address] [place] [address] since your last visit? Include any pap smears or colon screening.  no

## 2018-01-29 ENCOUNTER — HOSPITAL ENCOUNTER (OUTPATIENT)
Dept: LAB | Age: 73
Discharge: HOME | End: 2018-01-29
Payer: MEDICARE

## 2018-01-29 ENCOUNTER — OFFICE VISIT (OUTPATIENT)
Dept: INTERNAL MEDICINE CLINIC | Age: 73
End: 2018-01-29

## 2018-01-29 VITALS
OXYGEN SATURATION: 96 % | DIASTOLIC BLOOD PRESSURE: 75 MMHG | TEMPERATURE: 98 F | RESPIRATION RATE: 15 BRPM | HEIGHT: 68 IN | HEART RATE: 68 BPM | BODY MASS INDEX: 28.95 KG/M2 | SYSTOLIC BLOOD PRESSURE: 135 MMHG | WEIGHT: 191 LBS

## 2018-01-29 DIAGNOSIS — Z23 ENCOUNTER FOR IMMUNIZATION: ICD-10-CM

## 2018-01-29 DIAGNOSIS — Z76.0 MEDICATION REFILL: ICD-10-CM

## 2018-01-29 DIAGNOSIS — E78.5 DYSLIPIDEMIA: Chronic | ICD-10-CM

## 2018-01-29 DIAGNOSIS — E11.9 TYPE 2 DIABETES MELLITUS WITHOUT COMPLICATION, WITHOUT LONG-TERM CURRENT USE OF INSULIN (HCC): Primary | Chronic | ICD-10-CM

## 2018-01-29 DIAGNOSIS — J06.9 UPPER RESPIRATORY TRACT INFECTION, UNSPECIFIED TYPE: ICD-10-CM

## 2018-01-29 DIAGNOSIS — E11.9 TYPE 2 DIABETES MELLITUS WITHOUT COMPLICATION, WITHOUT LONG-TERM CURRENT USE OF INSULIN (HCC): Chronic | ICD-10-CM

## 2018-01-29 LAB
ALBUMIN SERPL-MCNC: 4 G/DL (ref 3.4–5)
ALBUMIN/GLOB SERPL: 1.2 {RATIO} (ref 0.8–1.7)
ALP SERPL-CCNC: 67 U/L (ref 45–117)
ALT SERPL-CCNC: 28 U/L (ref 13–56)
ANION GAP SERPL CALC-SCNC: 11 MMOL/L (ref 3–18)
AST SERPL-CCNC: 25 U/L (ref 15–37)
BILIRUB SERPL-MCNC: 0.3 MG/DL (ref 0.2–1)
BUN SERPL-MCNC: 13 MG/DL (ref 7–18)
BUN/CREAT SERPL: 18 (ref 12–20)
CALCIUM SERPL-MCNC: 9.3 MG/DL (ref 8.5–10.1)
CHLORIDE SERPL-SCNC: 104 MMOL/L (ref 100–108)
CHOLEST SERPL-MCNC: 251 MG/DL
CO2 SERPL-SCNC: 25 MMOL/L (ref 21–32)
CREAT SERPL-MCNC: 0.71 MG/DL (ref 0.6–1.3)
GLOBULIN SER CALC-MCNC: 3.4 G/DL (ref 2–4)
GLUCOSE SERPL-MCNC: 78 MG/DL (ref 74–99)
HBA1C MFR BLD: 6.7 % (ref 4.2–5.6)
HDLC SERPL-MCNC: 53 MG/DL (ref 40–60)
HDLC SERPL: 4.7 {RATIO} (ref 0–5)
LDLC SERPL CALC-MCNC: 153 MG/DL (ref 0–100)
LIPID PROFILE,FLP: ABNORMAL
POTASSIUM SERPL-SCNC: 4.2 MMOL/L (ref 3.5–5.5)
PROT SERPL-MCNC: 7.4 G/DL (ref 6.4–8.2)
SODIUM SERPL-SCNC: 140 MMOL/L (ref 136–145)
TRIGL SERPL-MCNC: 225 MG/DL (ref ?–150)
VLDLC SERPL CALC-MCNC: 45 MG/DL

## 2018-01-29 PROCEDURE — 80061 LIPID PANEL: CPT | Performed by: INTERNAL MEDICINE

## 2018-01-29 PROCEDURE — 80053 COMPREHEN METABOLIC PANEL: CPT | Performed by: INTERNAL MEDICINE

## 2018-01-29 PROCEDURE — 83036 HEMOGLOBIN GLYCOSYLATED A1C: CPT | Performed by: INTERNAL MEDICINE

## 2018-01-29 PROCEDURE — 36415 COLL VENOUS BLD VENIPUNCTURE: CPT | Performed by: INTERNAL MEDICINE

## 2018-01-29 RX ORDER — SIMVASTATIN 20 MG/1
TABLET, FILM COATED ORAL
Qty: 90 TAB | Refills: 5 | Status: SHIPPED | OUTPATIENT
Start: 2018-01-29 | End: 2020-01-06

## 2018-01-29 NOTE — PROGRESS NOTES
ROOM # [id number] [facility] presents today for   Chief Complaint   Patient presents with    Diabetes    Cholesterol Problem    Chest Congestion     productive cough yellow mucous       Heavenly Petersen preferred language for health care discussion is english/other. Is someone accompanying this pt? no    Is the patient using any DME equipment during OV? no    Depression Screening:  PHQ over the last two weeks 11/7/2017 8/11/2017 4/20/2017 3/25/2017 2/23/2017 2/17/2017 9/16/2016   PHQ Not Done - - - - Active Diagnosis of Depression or Bipolar Disorder - -   Little interest or pleasure in doing things Not at all Not at all Not at all Not at all - Not at all Not at all   Feeling down, depressed or hopeless Not at all Not at all Not at all Not at all - Not at all Not at all   Total Score PHQ 2 0 0 0 0 - 0 0       Learning Assessment:  Learning Assessment 1/22/2015 11/21/2014 4/4/2014   PRIMARY LEARNER Patient Patient Patient   HIGHEST LEVEL OF EDUCATION - PRIMARY LEARNER  - GRADUATED HIGH SCHOOL OR GED -   BARRIERS PRIMARY LEARNER NONE NONE -   PRIMARY LANGUAGE ENGLISH ENGLISH ENGLISH   LEARNER PREFERENCE PRIMARY READING LISTENING DEMONSTRATION     - READING -     - DEMONSTRATION -   ANSWERED BY pat Patient -   RELATIONSHIP SELF SELF -       Abuse Screening:  Abuse Screening Questionnaire 7/22/2015   Do you ever feel afraid of your partner? N   Are you in a relationship with someone who physically or mentally threatens you? N   Is it safe for you to go home? Y       Fall Risk  Fall Risk Assessment, last 12 mths 11/7/2017 8/11/2017 4/20/2017 3/25/2017 2/23/2017 2/17/2017 9/16/2016   Able to walk? Yes Yes Yes Yes Yes Yes Yes   Fall in past 12 months? No No No No No No No       Health Maintenance reviewed and discussed per provider. Yes    Heavenly Petersen is due for eye exam, foot exam, flu vaccine. Please order/place referral if appropriate. Advance Directive:  1.  Do you have an advance directive in place? Patient Reply: no    2. If not, would you like material regarding how to put one in place? Patient Reply: no    Coordination of Care:  1. Have you been to the ER, urgent care clinic since your last visit? Hospitalized since your last visit? no    2. Have you seen or consulted any other health care providers outside of the [address] [place] [address] since your last visit? Include any pap smears or colon screening.  no

## 2018-01-29 NOTE — MR AVS SNAPSHOT
[address] [facility] 
 
 
 [address] [facility] [address] 
[phone] Patient: Keyon Shook MRN: [id number] :1945 Visit Information Date & Time Provider Department Dept. Phone Encounter #  
 2018  2:15 PM Kathia VickeyclarkInPronto [phone] [id number] Follow-up Instructions Return in about 4 months (around 2018) for HTN, DM, cholesterol. Upcoming Health Maintenance Date Due  
 EYE EXAM RETINAL OR DILATED Q1 10/13/1955 GLAUCOMA SCREENING Q2Y 10/13/2010 FOOT EXAM Q1 2015 Influenza Age 5 to Adult 2017 HEMOGLOBIN A1C Q6M 2018 MICROALBUMIN Q1 2018 LIPID PANEL Q1 2018 MEDICARE YEARLY EXAM 2018 COLONOSCOPY 2018 BREAST CANCER SCRN MAMMOGRAM 2019 DTaP/Tdap/Td series (2 - Td) 2026 Allergies as of 2018  Review Complete On: 2018 By: Kathia Law MD  
  
 Severity Noted Reaction Type Reactions Pcn [Penicillins] High 2011    Anaphylaxis, Hives Other reaction(s): anaphylaxis/angioedema Venom-honey Bee High 2013   Side Effect Anaphylaxis Current Immunizations  Reviewed on 2018 Name Date Influenza High Dose Vaccine PF  Incomplete, 2016 Influenza Vaccine 2008 12:00 AM  
 Influenza Vaccine Split 2011, 10/19/2010 11:07 AM  
 Pneumococcal Conjugate (PCV-13) 2017 TD Vaccine 10/19/2007 Tdap 2016 12:00 AM, 2014  6:50 PM  
 ZZZ-RETIRED (DO NOT USE) Pneumococcal Vaccine (Unspecified Type) 1/3/2012 Zoster 10/19/2008 Not reviewed this visit You Were Diagnosed With   
  
 Codes Comments Type 2 diabetes mellitus without complication, without long-term current use of insulin (HCC)    -  Primary ICD-10-CM: E11.9 ICD-9-CM: 250.00 Dyslipidemia     ICD-10-CM: E78.5 ICD-9-CM: 272.4 Upper respiratory tract infection, unspecified type     ICD-10-CM: J06.9 ICD-9-CM: 465.9 Medication refill     ICD-10-CM: Z76.0 ICD-9-CM: V68.1 Encounter for immunization     ICD-10-CM: C59 ICD-9-CM: V03.89 Vitals BP Pulse Temp Resp Height(growth percentile) Weight(growth percentile) 135/75 68 98 °F (36.7 °C) (Oral) 15 5' 8\" (1.727 m) 191 lb (86.6 kg) SpO2 BMI OB Status Smoking Status 96% 29.04 kg/m2 Postmenopausal Never Smoker BMI and BSA Data Body Mass Index Body Surface Area 29.04 kg/m 2 2.04 m 2 Preferred Pharmacy Pharmacy Name Phone [facility] [address] [place] [address] [phone] Your Updated Medication List  
  
   
This list is accurate as of: 1/29/18  3:11 PM.  Always use your most recent med list.  
  
  
  
  
 albuterol 90 mcg/actuation inhaler Commonly known as:  PROVENTIL HFA, VENTOLIN HFA, PROAIR HFA Take 2 Puffs by inhalation every six (6) hours as needed for Wheezing or Shortness of Breath. aspirin 325 mg tablet Commonly known as:  ASPIRIN Take 2 Tabs by mouth two (2) times a day. Blood-Glucose Meter monitoring kit Commonly known as:  Temelizabethe Perfect Use to test blood sugar daily  
  
 ergocalciferol 50,000 unit capsule Commonly known as:  VITAMIN D2  
TAKE ONE CAPSULE BY MOUTH ONCE A WEEK  
  
 estradiol 0.1 mg/24 hr  
Commonly known as:  CLIMARA APPLY ONE PATCH TOPICALLY ONCE A WEEK ON  MONDAY  
  
 fluticasone 50 mcg/actuation nasal spray Commonly known as:  Andrew Olga 2 Sprays by Both Nostrils route daily. Indications: ALLERGIC RHINITIS  
  
 glucose blood VI test strips strip Commonly known as:  ONETOUCH ULTRA TEST Use to test blood sugar daily Lancets Misc Commonly known as: One Touch [place] Use to test blood sugar once daily Lancing Device with Lancets Kit Commonly known as: One Touch [place] Use to test blood sugar daily metFORMIN  mg tablet Commonly known as:  GLUCOPHAGE XR  
TAKE ONE TABLET BY MOUTH ONCE DAILY WITH SUPPER  
  
 multivitamin tablet Commonly known as:  ONE A DAY Take 1 Tab by mouth daily. NEXIUM PO Take  by mouth as needed. rOPINIRole 0.5 mg tablet Commonly known as:  Waleska Maxim Take 1 Tab by mouth two (2) times daily as needed. Indications: Restless Legs Syndrome  
  
 simvastatin 20 mg tablet Commonly known as:  ZOCOR  
TAKE ONE TABLET BY MOUTH AT BEDTIME  Indications: hyperlipidemia  
  
 traMADol 50 mg tablet Commonly known as:  ULTRAM  
TAKE ONE TABLET BY MOUTH EVERY EIGHT HOURS AS NEEDED FOR PAIN  Indications: Pain Prescriptions Printed Refills  
 simvastatin (ZOCOR) 20 mg tablet 5 Sig: TAKE ONE TABLET BY MOUTH AT BEDTIME  Indications: hyperlipidemia Class: Print We Performed the Following ADMIN INFLUENZA VIRUS VAC [ [facility]] HM DIABETES FOOT EXAM [HM7 Custom] INFLUENZA VIRUS VACCINE, HIGH DOSE SEASONAL, PRESERVATIVE FREE [68675 CPT(R)] Follow-up Instructions Return in about 4 months (around 5/29/2018) for HTN, DM, cholesterol. To-Do List   
 01/29/2018 Lab:  HEMOGLOBIN A1C W/O EAG   
  
 01/29/2018 Lab:  LIPID PANEL   
  
 01/29/2018 Lab:  METABOLIC PANEL, COMPREHENSIVE [facility]! Dear Rafael: Thank you for requesting a [employer] account. Our records indicate that you already have an active [employer] account. You can access your account anytime at [email or web address] Did you know that you can access your hospital and ER discharge instructions at any time in [employer]? You can also review all of your test results from your hospital stay or ER visit. Additional Information If you have questions, please visit the Frequently Asked Questions section of the [employer] website at [email or web address] Remember, MyChart is NOT to be used for urgent needs. For medical emergencies, dial 911. Now available from your iPhone and Android! Please provide this summary of care documentation to your next provider. Your primary care clinician is listed as [facility]. If you have any questions after today's visit, please call [phone].

## 2018-01-29 NOTE — PROGRESS NOTES
HISTORY OF PRESENT ILLNESS  Reji Cisneros is a 67 y.o. female. Visit Vitals    /75    Pulse 68    Temp 98 °F (36.7 °C) (Oral)    Resp 15    Ht 5' 8\" (1.727 m)    Wt 191 lb (86.6 kg)    SpO2 96%    BMI 29.04 kg/m2       Diabetes   The history is provided by the patient. This is a chronic problem. The current episode started more than 1 week ago. The problem occurs daily. The problem has not changed since onset. Pertinent negatives include no chest pain. Exacerbated by: diet. The symptoms are relieved by medications (diet). Cholesterol Problem   The history is provided by the patient. This is a chronic problem. The current episode started more than 1 week ago. The problem occurs daily. The problem has not changed since onset. Pertinent negatives include no chest pain. Exacerbated by: diet. The symptoms are relieved by medications (diet). Review of Systems   Constitutional: Negative. Negative for chills and fever. HENT:        Some sinus congestion with yellow drainage for a couple of days. No fevers or chills. Cardiovascular: Negative for chest pain and palpitations. Genitourinary: Negative for frequency and urgency. Neurological: Negative for dizziness. Endo/Heme/Allergies: Negative for polydipsia. Physical Exam   Constitutional: She is oriented to person, place, and time. She appears well-developed and well-nourished. No distress. Cardiovascular: Normal rate and regular rhythm. Pulmonary/Chest: Effort normal and breath sounds normal. No respiratory distress. She has no wheezes. She has no rales. Musculoskeletal: She exhibits no edema. Neurological: She is alert and oriented to person, place, and time.    Diabetic foot exam:     Left: Reflexes 2+     Vibratory sensation diminished    Proprioception normal   Sharp/dull discrimination     Filament test normal sensation with micro filament   Pulse DP: 2+ (normal)   Pulse PT: 2+ (normal)   Deformities: Mild - metatarsal callous    Right: Reflexes 2+   Vibratory sensation diminished   Proprioception normal   Sharp/dull discrimination    Filament test normal sensation with micro filament   Pulse DP: 2+ (normal)   Pulse PT: 2+ (normal)   Deformities: Mild - metatarsal callous     Skin: Skin is warm and dry. She is not diaphoretic.   5 mm scaly skin papule on right wrist.    Psychiatric: She has a normal mood and affect. Nursing note and vitals reviewed. ASSESSMENT and PLAN    ICD-10-CM ICD-9-CM    1. Type 2 diabetes mellitus without complication, without long-term current use of insulin ([facility]) B58.5 027.30 METABOLIC PANEL, COMPREHENSIVE       DIABETES FOOT EXAM      HEMOGLOBIN A1C W/O EAG      LIPID PANEL   2. Dyslipidemia E78.5 272.4 LIPID PANEL   3. Upper respiratory tract infection, unspecified type J06.9 465.9    4. Medication refill Z76.0 V68.1 simvastatin (ZOCOR) 20 mg tablet   5. Encounter for immunization Z23 V03.89 INFLUENZA VIRUS VACCINE, HIGH DOSE SEASONAL, PRESERVATIVE FREE      ADMIN INFLUENZA VIRUS VAC       DM controlled  Due for update lab    URI--mild and likely viral. No tx needed but the tussionex she has at home. Fluids, rest, gargles prn.     F/u 4 months for DM, chol

## 2018-02-12 ENCOUNTER — OFFICE VISIT (OUTPATIENT)
Dept: INTERNAL MEDICINE CLINIC | Age: 73
End: 2018-02-12

## 2018-02-12 VITALS
HEART RATE: 72 BPM | BODY MASS INDEX: 29.46 KG/M2 | WEIGHT: 194.4 LBS | SYSTOLIC BLOOD PRESSURE: 143 MMHG | HEIGHT: 68 IN | DIASTOLIC BLOOD PRESSURE: 81 MMHG | RESPIRATION RATE: 16 BRPM | TEMPERATURE: 97.1 F | OXYGEN SATURATION: 98 %

## 2018-02-12 DIAGNOSIS — J06.9 VIRAL UPPER RESPIRATORY TRACT INFECTION: ICD-10-CM

## 2018-02-12 DIAGNOSIS — R22.31 AXILLARY MASS, RIGHT: Primary | ICD-10-CM

## 2018-02-12 DIAGNOSIS — M62.830 BACK MUSCLE SPASM: ICD-10-CM

## 2018-02-12 RX ORDER — ALBUTEROL SULFATE 90 UG/1
2 AEROSOL, METERED RESPIRATORY (INHALATION)
Qty: 1 INHALER | Refills: 5 | Status: SHIPPED | OUTPATIENT
Start: 2018-02-12 | End: 2019-03-22 | Stop reason: SDUPTHER

## 2018-02-12 RX ORDER — CETIRIZINE HYDROCHLORIDE, PSEUDOEPHEDRINE HYDROCHLORIDE 5; 120 MG/1; MG/1
1 TABLET, FILM COATED, EXTENDED RELEASE ORAL 2 TIMES DAILY
Qty: 24 TAB | Refills: 2 | Status: SHIPPED | OUTPATIENT
Start: 2018-02-12 | End: 2018-06-18 | Stop reason: SDUPTHER

## 2018-02-12 RX ORDER — BACLOFEN 10 MG/1
10 TABLET ORAL
Qty: 90 TAB | Refills: 1 | Status: SHIPPED | OUTPATIENT
Start: 2018-02-12 | End: 2018-10-03

## 2018-02-12 NOTE — MR AVS SNAPSHOT
[address] [facility] 
 
 
 [address] [facility] 83 [address] 
[phone] Patient: Stanly Shone MRN: [id number] :1945 Visit Information Date & Time Provider Department Dept. Phone Encounter #  
 2018  1:30 PM Abdelrahman Arambula, [address] [facility] [phone] [id number] Upcoming Health Maintenance Date Due  
 EYE EXAM RETINAL OR DILATED Q1 10/13/1955 GLAUCOMA SCREENING Q2Y 10/13/2010 HEMOGLOBIN A1C Q6M 2018 MICROALBUMIN Q1 2018 MEDICARE YEARLY EXAM 2018 COLONOSCOPY 2018 FOOT EXAM Q1 2019 LIPID PANEL Q1 2019 BREAST CANCER SCRN MAMMOGRAM 2019 DTaP/Tdap/Td series (2 - Td) 2026 Allergies as of 2018  Review Complete On: 2018 By: Abdelrahman Arambula MD  
  
 Severity Noted Reaction Type Reactions Pcn [Penicillins] High 2011    Anaphylaxis, Hives Other reaction(s): anaphylaxis/angioedema Venom-honey Bee High 2013   Side Effect Anaphylaxis Current Immunizations  Reviewed on 2018 Name Date Influenza High Dose Vaccine PF 2018, 2016 Influenza Vaccine 2008 12:00 AM  
 Influenza Vaccine Split 2011, 10/19/2010 11:07 AM  
 Pneumococcal Conjugate (PCV-13) 2017 TD Vaccine 10/19/2007 Tdap 2016 12:00 AM, 2014  6:50 PM  
 ZZZ-RETIRED (DO NOT USE) Pneumococcal Vaccine (Unspecified Type) 1/3/2012 Zoster 10/19/2008 Not reviewed this visit You Were Diagnosed With   
  
 Codes Comments Axillary mass, right    -  Primary ICD-10-CM: R22.31 
ICD-9-CM: 782.2 Back muscle spasm     ICD-10-CM: D35.996 ICD-9-CM: 724.8 Viral upper respiratory tract infection     ICD-10-CM: J06.9, B97.89 ICD-9-CM: 465.9 Vitals BP Pulse Temp Resp Height(growth percentile) Weight(growth percentile) 143/81 (BP 1 Location: Left arm, BP Patient Position: Sitting) 72 97.1 °F (36.2 °C) (Oral) 16 5' 8\" (1.727 m) 194 lb 6.4 oz (88.2 kg) SpO2 BMI OB Status Smoking Status 98% 29.56 kg/m2 Postmenopausal Never Smoker Vitals History BMI and BSA Data Body Mass Index Body Surface Area  
 29.56 kg/m 2 2.06 m 2 Preferred Pharmacy Pharmacy Name Phone [facility] [address] [place] [address] [phone] Your Updated Medication List  
  
   
This list is accurate as of: 2/12/18  2:27 PM.  Always use your most recent med list.  
  
  
  
  
 albuterol 90 mcg/actuation inhaler Commonly known as:  PROVENTIL HFA, VENTOLIN HFA, PROAIR HFA Take 2 Puffs by inhalation every six (6) hours as needed for Wheezing or Shortness of Breath. aspirin 325 mg tablet Commonly known as:  ASPIRIN Take 2 Tabs by mouth two (2) times a day. baclofen 10 mg tablet Commonly known as:  LIORESAL Take 1 Tab by mouth three (3) times daily as needed. Blood-Glucose Meter monitoring kit Commonly known as:  Maribell Roe Use to test blood sugar daily  
  
 cetirizine-psuedoePHEDrine 5-120 mg per tablet Commonly known as:  ZyrTEC-D Take 1 Tab by mouth two (2) times a day. ergocalciferol 50,000 unit capsule Commonly known as:  VITAMIN D2  
TAKE ONE CAPSULE BY MOUTH ONCE A WEEK  
  
 estradiol 0.1 mg/24 hr  
Commonly known as:  CLIMARA APPLY ONE PATCH TOPICALLY ONCE A WEEK ON  MONDAY  
  
 fluticasone 50 mcg/actuation nasal spray Commonly known as:  Jeppie Holcomb 2 Sprays by Both Nostrils route daily. Indications: ALLERGIC RHINITIS  
  
 glucose blood VI test strips strip Commonly known as:  ONETOUCH ULTRA TEST Use to test blood sugar daily  
  
 inhalational spacing device Use spacer every time you use your inhaler Lancets Misc Commonly known as: One Touch Toby Mola Use to test blood sugar once daily Lancing Device with Lancets Kit Commonly known as: One Touch Sterling Erps Use to test blood sugar daily  
  
 metFORMIN  mg tablet Commonly known as:  GLUCOPHAGE XR  
TAKE ONE TABLET BY MOUTH ONCE DAILY WITH SUPPER  
  
 multivitamin tablet Commonly known as:  ONE A DAY Take 1 Tab by mouth daily. NEXIUM PO Take  by mouth as needed. rOPINIRole 0.5 mg tablet Commonly known as:  [place] Take 1 Tab by mouth two (2) times daily as needed. Indications: Restless Legs Syndrome  
  
 simvastatin 20 mg tablet Commonly known as:  ZOCOR  
TAKE ONE TABLET BY MOUTH AT BEDTIME  Indications: hyperlipidemia  
  
 traMADol 50 mg tablet Commonly known as:  ULTRAM  
TAKE ONE TABLET BY MOUTH EVERY EIGHT HOURS AS NEEDED FOR PAIN  Indications: Pain Prescriptions Sent to Pharmacy Refills  
 baclofen (LIORESAL) 10 mg tablet 1 Sig: Take 1 Tab by mouth three (3) times daily as needed. Class: Normal  
 Pharmacy: [address], [place] [address]. Ph #: [phone] Route: Oral  
 cetirizine-psuedoePHEDrine (ZYRTEC-D) 5-120 mg per tablet 2 Sig: Take 1 Tab by mouth two (2) times a day. Class: Normal  
 Pharmacy: [address], [address] [place] [address]. Ph #: [phone] Route: Oral  
 albuterol (PROVENTIL HFA, VENTOLIN HFA, PROAIR HFA) 90 mcg/actuation inhaler 5 Sig: Take 2 Puffs by inhalation every six (6) hours as needed for Wheezing or Shortness of Breath. Class: Normal  
 Pharmacy: [address], [place] [address]. Ph #: [phone] Route: Inhalation  
 inhalational spacing device 0 Sig: Use spacer every time you use your inhaler Class: Normal  
 Pharmacy: [address], [address] [place] [address]. Ph #: [phone] To-Do List   
 02/12/2018 Imaging:  US CHEST Introducing [facility]! Dear Gaurav Deter: Thank you for requesting a "[employer] account. Our records indicate that you already have an active "[employer] account. You can access your account anytime at [email or web address] Did you know that you can access your hospital and ER discharge instructions at any time in "[employer]? You can also review all of your test results from your hospital stay or ER visit. Additional Information If you have questions, please visit the Frequently Asked Questions section of the "[employer] website at [email or web address] Remember, "[employer] is NOT to be used for urgent needs. For medical emergencies, dial 911. Now available from your iPhone and Android! Please provide this summary of care documentation to your next provider. Your primary care clinician is listed as [facility]. If you have any questions after today's visit, please call [phone].

## 2018-02-12 NOTE — PROGRESS NOTES
Praful Schneider presents today for   Chief Complaint   Patient presents with    Other     right axillary pain and swelling    Sore Throat    Cough     chest congestion, occ productive- yellow. Afebrile       Praful Schneider preferred language for health care discussion is english/other. Is someone accompanying this pt? no    Is the patient using any DME equipment during OV? no    Depression Screening:  PHQ over the last two weeks 2/12/2018 11/7/2017 8/11/2017 4/20/2017 3/25/2017 2/23/2017 2/17/2017   PHQ Not Done - - - - - Active Diagnosis of Depression or Bipolar Disorder -   Little interest or pleasure in doing things Not at all Not at all Not at all Not at all Not at all - Not at all   Feeling down, depressed or hopeless Not at all Not at all Not at all Not at all Not at all - Not at all   Total Score PHQ 2 0 0 0 0 0 - 0       Learning Assessment:  Learning Assessment 1/22/2015 11/21/2014 4/4/2014   PRIMARY LEARNER Patient Patient Patient   HIGHEST LEVEL OF EDUCATION - PRIMARY LEARNER  - GRADUATED HIGH SCHOOL OR GED -   BARRIERS PRIMARY LEARNER NONE NONE -   PRIMARY LANGUAGE ENGLISH ENGLISH ENGLISH   LEARNER PREFERENCE PRIMARY READING LISTENING DEMONSTRATION     - READING -     - DEMONSTRATION -   ANSWERED BY pat Patient -   RELATIONSHIP SELF SELF -       Abuse Screening:  Abuse Screening Questionnaire 7/22/2015   Do you ever feel afraid of your partner? N   Are you in a relationship with someone who physically or mentally threatens you? N   Is it safe for you to go home? Y       Fall Risk  Fall Risk Assessment, last 12 mths 2/12/2018 11/7/2017 8/11/2017 4/20/2017 3/25/2017 2/23/2017 2/17/2017   Able to walk? Yes Yes Yes Yes Yes Yes Yes   Fall in past 12 months? No No No No No No No       Health Maintenance reviewed and discussed per provider. Yes    Praful Schneider is due for eye exam, glaucoma screening. Please order/place referral if appropriate. Advance Directive:  1.  Do you have an advance directive in place? Patient Reply: no    2. If not, would you like material regarding how to put one in place? Patient Reply: no    Coordination of Care:  1. Have you been to the ER, urgent care clinic since your last visit? Hospitalized since your last visit? no    2. Have you seen or consulted any other health care providers outside of the [address] [place] [address] since your last visit? Include any pap smears or colon screening.  no

## 2018-02-12 NOTE — PROGRESS NOTES
FAMILY MEDICINE CLINIC NOTE    S: Cough occasionally productive of whitish sputum for the last 2 days. The same throughout the day. No fever, no sick contacts. She did receive the flu shot     Right axillary lump with tenderness, gotten larger, has been there for several years, no drainage. Last mammogram was within the last year and was normal    Right lumbar and shoulder achy pain for the last several days. Does not recall any trauma. Not utilizing anything for it. O:  Visit Vitals    /81 (BP 1 Location: Left arm, BP Patient Position: Sitting)    Pulse 72    Temp 97.1 °F (36.2 °C) (Oral)    Resp 16    Ht 5' 8\" (1.727 m)    Wt 194 lb 6.4 oz (88.2 kg)    SpO2 98%    BMI 29.56 kg/m2     NAD, comfortable  Erythema of the posterior oropharynx  RRR, no murmurs  CTABL, no wheezing/ronchi/rales  Tender lump in the right axillary region   Right lumbar spasm    67 y.o. female      ICD-10-CM ICD-9-CM    1. Axillary mass, right R22.31 782.2 US CHEST   2. Back muscle spasm M62.830 724.8 baclofen (LIORESAL) 10 mg tablet   3.  Viral upper respiratory tract infection J06.9 465.9 cetirizine-psuedoePHEDrine (ZYRTEC-D) 5-120 mg per tablet    B97.89  albuterol (PROVENTIL HFA, VENTOLIN HFA, PROAIR HFA) 90 mcg/actuation inhaler      inhalational spacing device

## 2018-02-14 ENCOUNTER — TELEPHONE (OUTPATIENT)
Dept: INTERNAL MEDICINE CLINIC | Age: 73
End: 2018-02-14

## 2018-02-14 NOTE — TELEPHONE ENCOUNTER
Patient is scheduled for an appointment for an ultrasound at [facility] but she wants to be sent to [facility]/

## 2018-02-14 NOTE — TELEPHONE ENCOUNTER
Patient is calling back to let you know she cancelled her appt at [facility], Would like the order for the US to please be faxed to [facility] so she can have it done at [address] [place] [address] [place].

## 2018-02-19 ENCOUNTER — TELEPHONE (OUTPATIENT)
Dept: INTERNAL MEDICINE CLINIC | Age: 73
End: 2018-02-19

## 2018-02-19 DIAGNOSIS — M79.621 AXILLARY TENDERNESS, RIGHT: ICD-10-CM

## 2018-02-19 DIAGNOSIS — R22.31 AXILLARY MASS, RIGHT: Primary | ICD-10-CM

## 2018-02-19 NOTE — TELEPHONE ENCOUNTER
Patient was seen by Dr. Lutz August last week for a mass under her (R) breast.  States the scheduling dept at [facility] called and told here they need an order for a (R) diagnostic breast with US. Asked if it was a mammogram and she said no. Stated the scheduling center will be faxing something over to the office.

## 2018-02-19 NOTE — TELEPHONE ENCOUNTER
Contacted [facility] scheduling dept spoke with register. Two patient Identifiers confirmed. She stated she could not find anyone under that name. I advised it was regarding a diagnostic test. She then transferred me to [address] [place] [address] advised that it had only been about six months since pts last mammo. Advised pt had a new issue under right axillary. She advised pt would need a right sided mammogram and US as needed ordered and faxed to [phone]. Please advise.

## 2018-02-19 NOTE — TELEPHONE ENCOUNTER
According to Dr. Ramírez Nice note, it should not be a mammogram.   A \"right diagnostic breast\" is a mammogram. We need to clarify this with [facility] scheduling

## 2018-02-21 ENCOUNTER — TELEPHONE (OUTPATIENT)
Dept: INTERNAL MEDICINE CLINIC | Age: 73
End: 2018-02-21

## 2018-02-21 NOTE — TELEPHONE ENCOUNTER
Patient called and would like to speak to a nurse about the diagnostic mammo/ US as soon as possible.  Patient is planning to go in tomorrow (2/22/18) for test.

## 2018-04-30 ENCOUNTER — TELEPHONE (OUTPATIENT)
Dept: INTERNAL MEDICINE CLINIC | Age: 73
End: 2018-04-30

## 2018-04-30 NOTE — TELEPHONE ENCOUNTER
Contacted pt. 2 pt identifiers confirmed. Pt asked where she receives eye care. Pt states doesn't know the name of the place but it is in [place]. Searched eye doctors in that area for pt. Pt states it is the [facility] but does not understand why we need this information. Pt notified that we like to follow overall health and not just medical. Pt states she does not like this and thinks it is not necessary. Pt advised I will not call eye doctor office due to this. Pt then states I could call them but she still did not like it. Pt advised I am not going to call eye doctor's office. Pt verbalized understanding of all info. No further questions or concerns at this time.

## 2018-06-18 ENCOUNTER — OFFICE VISIT (OUTPATIENT)
Dept: INTERNAL MEDICINE CLINIC | Age: 73
End: 2018-06-18

## 2018-06-18 VITALS
BODY MASS INDEX: 28.16 KG/M2 | OXYGEN SATURATION: 98 % | WEIGHT: 185.8 LBS | SYSTOLIC BLOOD PRESSURE: 157 MMHG | TEMPERATURE: 97.3 F | DIASTOLIC BLOOD PRESSURE: 71 MMHG | RESPIRATION RATE: 22 BRPM | HEART RATE: 76 BPM | HEIGHT: 68 IN

## 2018-06-18 DIAGNOSIS — M25.511 CHRONIC RIGHT SHOULDER PAIN: Primary | ICD-10-CM

## 2018-06-18 DIAGNOSIS — R35.0 URINARY FREQUENCY: ICD-10-CM

## 2018-06-18 DIAGNOSIS — J06.9 VIRAL UPPER RESPIRATORY TRACT INFECTION: ICD-10-CM

## 2018-06-18 DIAGNOSIS — G89.29 CHRONIC RIGHT SHOULDER PAIN: Primary | ICD-10-CM

## 2018-06-18 LAB
BILIRUB UR QL STRIP: NORMAL
GLUCOSE UR-MCNC: NORMAL MG/DL
KETONES P FAST UR STRIP-MCNC: NEGATIVE MG/DL
PH UR STRIP: 5 [PH] (ref 4.6–8)
PROT UR QL STRIP: NEGATIVE
SP GR UR STRIP: 1.03 (ref 1–1.03)
UA UROBILINOGEN AMB POC: NORMAL (ref 0.2–1)
URINALYSIS CLARITY POC: CLEAR
URINALYSIS COLOR POC: YELLOW
URINE BLOOD POC: NEGATIVE
URINE LEUKOCYTES POC: NEGATIVE
URINE NITRITES POC: NEGATIVE

## 2018-06-18 RX ORDER — CETIRIZINE HYDROCHLORIDE, PSEUDOEPHEDRINE HYDROCHLORIDE 5; 120 MG/1; MG/1
1 TABLET, FILM COATED, EXTENDED RELEASE ORAL 2 TIMES DAILY
Qty: 24 TAB | Refills: 2 | Status: SHIPPED | OUTPATIENT
Start: 2018-06-18 | End: 2019-02-28

## 2018-06-18 NOTE — MR AVS SNAPSHOT
[address] [place] [address] 
 
 
 [address] [facility] [address] 
[phone] Patient: Kerry Patel MRN: [id number] :1945 Visit Information Date & Time Provider Department Dept. Phone Encounter #  
 2018 11:00 AM [address] [phone] [id number] Upcoming Health Maintenance Date Due  
 EYE EXAM RETINAL OR DILATED Q1 10/13/1955 GLAUCOMA SCREENING Q2Y 10/13/2010 HEMOGLOBIN A1C Q6M 2018 Influenza Age 5 to Adult 2018 MICROALBUMIN Q1 2018 MEDICARE YEARLY EXAM 2018 COLONOSCOPY 2018 FOOT EXAM Q1 2019 LIPID PANEL Q1 2019 BREAST CANCER SCRN MAMMOGRAM 2019 DTaP/Tdap/Td series (2 - Td) 2026 Allergies as of 2018  Review Complete On: 2018 By: Alessandro Kirkland LPN Severity Noted Reaction Type Reactions Pcn [Penicillins] High 2011    Anaphylaxis, Hives Other reaction(s): anaphylaxis/angioedema Venom-honey Bee High 2013   Side Effect Anaphylaxis Current Immunizations  Reviewed on 2018 Name Date Influenza High Dose Vaccine PF 2018, 2016 Influenza Vaccine 2008 12:00 AM  
 Influenza Vaccine Split 2011, 10/19/2010 11:07 AM  
 Pneumococcal Conjugate (PCV-13) 2017 TD Vaccine 10/19/2007 Tdap 2016 12:00 AM, 2014  6:50 PM  
 ZZZ-RETIRED (DO NOT USE) Pneumococcal Vaccine (Unspecified Type) 1/3/2012 Zoster 10/19/2008 Not reviewed this visit You Were Diagnosed With   
  
 Codes Comments Chronic right shoulder pain    -  Primary ICD-10-CM: M25.511, G89.29 ICD-9-CM: 719.41, 338.29 Viral upper respiratory tract infection     ICD-10-CM: J06.9 ICD-9-CM: 465.9 Urinary frequency     ICD-10-CM: R35.0 ICD-9-CM: 788.41 Vitals BP Pulse Temp Resp Height(growth percentile) Weight(growth percentile) 157/71 (BP 1 Location: Left arm, BP Patient Position: Sitting) 76 97.3 °F (36.3 °C) (Oral) 22 5' 8\" (1.727 m) 185 lb 12.8 oz (84.3 kg) SpO2 BMI OB Status Smoking Status 98% 28.25 kg/m2 Postmenopausal Never Smoker Vitals History BMI and BSA Data Body Mass Index Body Surface Area  
 28.25 kg/m 2 2.01 m 2 Preferred Pharmacy Pharmacy Name Phone [facility] [address] [place] [address] [phone] Your Updated Medication List  
  
   
This list is accurate as of 6/18/18 12:17 PM.  Always use your most recent med list.  
  
  
  
  
 albuterol 90 mcg/actuation inhaler Commonly known as:  PROVENTIL HFA, VENTOLIN HFA, PROAIR HFA Take 2 Puffs by inhalation every six (6) hours as needed for Wheezing or Shortness of Breath. aspirin 325 mg tablet Commonly known as:  ASPIRIN Take 2 Tabs by mouth two (2) times a day. baclofen 10 mg tablet Commonly known as:  LIORESAL Take 1 Tab by mouth three (3) times daily as needed. Blood-Glucose Meter monitoring kit Commonly known as:  [facility] Use to test blood sugar daily  
  
 cetirizine-psuedoePHEDrine 5-120 mg per tablet Commonly known as:  ZyrTEC-D Take 1 Tab by mouth two (2) times a day. ergocalciferol 50,000 unit capsule Commonly known as:  VITAMIN D2  
TAKE ONE CAPSULE BY MOUTH ONCE A WEEK  
  
 estradiol 0.1 mg/24 hr  
Commonly known as:  CLIMARA APPLY ONE PATCH TOPICALLY ONCE A WEEK ON  MONDAY  
  
 fluticasone 50 mcg/actuation nasal spray Commonly known as:  Rand Noa 2 Sprays by Both Nostrils route daily. Indications: ALLERGIC RHINITIS  
  
 glucose blood VI test strips strip Commonly known as:  ONETOUCH ULTRA TEST Use to test blood sugar daily  
  
 inhalational spacing device Use spacer every time you use your inhaler Lancets Misc Commonly known as: One Touch Ora Dies Use to test blood sugar once daily Lancing Device with Lancets Kit Commonly known as: One Touch Nell Bland Use to test blood sugar daily  
  
 metFORMIN  mg tablet Commonly known as:  GLUCOPHAGE XR  
TAKE ONE TABLET BY MOUTH ONCE DAILY WITH SUPPER  
  
 multivitamin tablet Commonly known as:  ONE A DAY Take 1 Tab by mouth daily. NEXIUM PO Take  by mouth as needed. rOPINIRole 0.5 mg tablet Commonly known as:  [place] Stare Take 1 Tab by mouth two (2) times daily as needed. Indications: Restless Legs Syndrome  
  
 simvastatin 20 mg tablet Commonly known as:  ZOCOR  
TAKE ONE TABLET BY MOUTH AT BEDTIME  Indications: hyperlipidemia  
  
 traMADol 50 mg tablet Commonly known as:  ULTRAM  
TAKE ONE TABLET BY MOUTH EVERY EIGHT HOURS AS NEEDED FOR PAIN  Indications: Pain Prescriptions Sent to Pharmacy Refills  
 cetirizine-psuedoePHEDrine (ZYRTEC-D) 5-120 mg per tablet 2 Sig: Take 1 Tab by mouth two (2) times a day. Class: Normal  
 Pharmacy: [address] [facility] [address] [address]. Ph #: [phone] Route: Oral  
  
We Performed the Following AMB POC URINALYSIS DIP STICK AUTO W/O MICRO [80803 CPT(R)] REFERRAL TO ORTHOPEDICS [DQQ464 Custom] Comments:  
 Please evaluate patient for right shoulder pain Referral Information Referral ID Referred By Referred To  
  
 [id number] [facility] [address] [place], [place] [address] Phone: [phone] Fax: [phone] Visits Status Start Date End Date 1 New Request 6/18/18 6/18/19 If your referral has a status of pending review or denied, additional information will be sent to support the outcome of this decision. Introducing [facility]! Dear Meredith Persaud: Thank you for requesting a Murfie account. Our records indicate that you already have an active Murfie account.   You can access your account anytime at [email or web address] Did you know that you can access your hospital and ER discharge instructions at any time in [employer]? You can also review all of your test results from your hospital stay or ER visit. Additional Information If you have questions, please visit the Frequently Asked Questions section of the [employer] website at [email or web address] Remember, [employer] is NOT to be used for urgent needs. For medical emergencies, dial 911. Now available from your iPhone and Android! Please provide this summary of care documentation to your next provider. Your primary care clinician is listed as [facility]. If you have any questions after today's visit, please call [phone].

## 2018-06-18 NOTE — PROGRESS NOTES
ROOM [address] presents today for   Chief Complaint   Patient presents with   [address] preferred language for health care discussion is english/other. Is someone accompanying this pt? no    Is the patient using any DME equipment during OV? no    Depression Screening:  PHQ over the last two weeks 6/18/2018 2/12/2018 11/7/2017 8/11/2017 4/20/2017 3/25/2017 2/23/2017   PHQ Not Done - - - - - - Active Diagnosis of Depression or Bipolar Disorder   Little interest or pleasure in doing things Not at all Not at all Not at all Not at all Not at all Not at all -   Feeling down, depressed or hopeless Not at all Not at all Not at all Not at all Not at all Not at all -   Total Score PHQ 2 0 0 0 0 0 0 -       Learning Assessment:  Learning Assessment 1/22/2015 11/21/2014 4/4/2014   PRIMARY LEARNER Patient Patient Patient   HIGHEST LEVEL OF EDUCATION - PRIMARY LEARNER  - GRADUATED HIGH SCHOOL OR GED -   BARRIERS PRIMARY LEARNER NONE NONE -   PRIMARY LANGUAGE ENGLISH ENGLISH ENGLISH   LEARNER PREFERENCE PRIMARY READING LISTENING DEMONSTRATION     - READING -     - DEMONSTRATION -   ANSWERED BY pat Patient -   RELATIONSHIP SELF SELF -       Abuse Screening:  Abuse Screening Questionnaire 7/22/2015   Do you ever feel afraid of your partner? N   Are you in a relationship with someone who physically or mentally threatens you? N   Is it safe for you to go home? Y       Fall Risk  Fall Risk Assessment, last 12 mths 6/18/2018 2/12/2018 11/7/2017 8/11/2017 4/20/2017 3/25/2017 2/23/2017   Able to walk? Yes Yes Yes Yes Yes Yes Yes   Fall in past 12 months? No No No No No No No       Health Maintenance reviewed and discussed per provider. Yes    Maryjane Hair is due for   Health Maintenance Due   Topic Date Due    EYE EXAM RETINAL OR DILATED Q1  10[birth year]13/1955    GLAUCOMA SCREENING Q2Y  10/13/2010     Please order/place referral if appropriate. Advance Directive:  1.  Do you have an advance directive in place? Patient Reply: no    2. If not, would you like material regarding how to put one in place? Patient Reply: no    Coordination of Care:  1. Have you been to the ER, urgent care clinic since your last visit? Hospitalized since your last visit? Yes, same symptoms as today    2. Have you seen or consulted any other health care providers outside of the [address] [place] [address] since your last visit? Include any pap smears or colon screening.  yes
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
## 2018-07-31 ENCOUNTER — TELEPHONE (OUTPATIENT)
Dept: INTERNAL MEDICINE CLINIC | Age: 73
End: 2018-07-31

## 2018-07-31 ENCOUNTER — OFFICE VISIT (OUTPATIENT)
Dept: INTERNAL MEDICINE CLINIC | Age: 73
End: 2018-07-31

## 2018-07-31 ENCOUNTER — HOSPITAL ENCOUNTER (OUTPATIENT)
Dept: LAB | Age: 73
Discharge: HOME | End: 2018-07-31
Payer: MEDICARE

## 2018-07-31 VITALS
RESPIRATION RATE: 20 BRPM | SYSTOLIC BLOOD PRESSURE: 148 MMHG | BODY MASS INDEX: 28.25 KG/M2 | HEART RATE: 70 BPM | DIASTOLIC BLOOD PRESSURE: 82 MMHG | TEMPERATURE: 98.8 F | WEIGHT: 186.4 LBS | HEIGHT: 68 IN | OXYGEN SATURATION: 96 %

## 2018-07-31 DIAGNOSIS — E78.5 DYSLIPIDEMIA: Chronic | ICD-10-CM

## 2018-07-31 DIAGNOSIS — L72.9 SKIN CYST: ICD-10-CM

## 2018-07-31 DIAGNOSIS — E11.9 TYPE 2 DIABETES MELLITUS WITHOUT COMPLICATION, WITHOUT LONG-TERM CURRENT USE OF INSULIN (HCC): Chronic | ICD-10-CM

## 2018-07-31 DIAGNOSIS — E11.9 TYPE 2 DIABETES MELLITUS WITHOUT COMPLICATION, WITHOUT LONG-TERM CURRENT USE OF INSULIN (HCC): Primary | Chronic | ICD-10-CM

## 2018-07-31 DIAGNOSIS — G44.89 OTHER HEADACHE SYNDROME: ICD-10-CM

## 2018-07-31 LAB
ALBUMIN SERPL-MCNC: 4 G/DL (ref 3.4–5)
ALBUMIN/GLOB SERPL: 1.1 {RATIO} (ref 0.8–1.7)
ALP SERPL-CCNC: 64 U/L (ref 45–117)
ALT SERPL-CCNC: 27 U/L (ref 13–56)
ANION GAP SERPL CALC-SCNC: 9 MMOL/L (ref 3–18)
AST SERPL-CCNC: 21 U/L (ref 15–37)
BILIRUB SERPL-MCNC: 0.5 MG/DL (ref 0.2–1)
BUN SERPL-MCNC: 8 MG/DL (ref 7–18)
BUN/CREAT SERPL: 12 (ref 12–20)
CALCIUM SERPL-MCNC: 9.5 MG/DL (ref 8.5–10.1)
CHLORIDE SERPL-SCNC: 106 MMOL/L (ref 100–108)
CHOLEST SERPL-MCNC: 244 MG/DL
CO2 SERPL-SCNC: 26 MMOL/L (ref 21–32)
CREAT SERPL-MCNC: 0.69 MG/DL (ref 0.6–1.3)
GLOBULIN SER CALC-MCNC: 3.5 G/DL (ref 2–4)
GLUCOSE SERPL-MCNC: 90 MG/DL (ref 74–99)
HBA1C MFR BLD: 6.5 % (ref 4.2–5.6)
HDLC SERPL-MCNC: 45 MG/DL (ref 40–60)
HDLC SERPL: 5.4 {RATIO} (ref 0–5)
LDLC SERPL CALC-MCNC: 156.8 MG/DL (ref 0–100)
LIPID PROFILE,FLP: ABNORMAL
POTASSIUM SERPL-SCNC: 3.9 MMOL/L (ref 3.5–5.5)
PROT SERPL-MCNC: 7.5 G/DL (ref 6.4–8.2)
SODIUM SERPL-SCNC: 141 MMOL/L (ref 136–145)
TRIGL SERPL-MCNC: 211 MG/DL (ref ?–150)
VLDLC SERPL CALC-MCNC: 42.2 MG/DL

## 2018-07-31 PROCEDURE — 80061 LIPID PANEL: CPT | Performed by: INTERNAL MEDICINE

## 2018-07-31 PROCEDURE — 80053 COMPREHEN METABOLIC PANEL: CPT | Performed by: INTERNAL MEDICINE

## 2018-07-31 PROCEDURE — 83036 HEMOGLOBIN GLYCOSYLATED A1C: CPT | Performed by: INTERNAL MEDICINE

## 2018-07-31 PROCEDURE — 82043 UR ALBUMIN QUANTITATIVE: CPT | Performed by: INTERNAL MEDICINE

## 2018-07-31 PROCEDURE — 36415 COLL VENOUS BLD VENIPUNCTURE: CPT | Performed by: INTERNAL MEDICINE

## 2018-07-31 NOTE — PROGRESS NOTES
ROOM # [address] [employer] presents today for   Chief Complaint   Patient presents with    Annual Wellness Visit       Zahra Conner preferred language for health care discussion is english/other. Is someone accompanying this pt? no    Is the patient using any DME equipment during OV? no    Depression Screening:  PHQ over the last two weeks 7/31/2018 6/18/2018 2/12/2018 11/7/2017 8/11/2017 4/20/2017 3/25/2017   PHQ Not Done - - - - - - -   Little interest or pleasure in doing things Not at all Not at all Not at all Not at all Not at all Not at all Not at all   Feeling down, depressed, irritable, or hopeless Not at all Not at all Not at all Not at all Not at all Not at all Not at all   Total Score PHQ 2 0 0 0 0 0 0 0       Learning Assessment:  Learning Assessment 1/22/2015 11/21/2014 4/4/2014   PRIMARY LEARNER Patient Patient Patient   HIGHEST LEVEL OF EDUCATION - PRIMARY LEARNER  - GRADUATED HIGH SCHOOL OR GED -   BARRIERS PRIMARY LEARNER NONE NONE -   PRIMARY LANGUAGE ENGLISH ENGLISH ENGLISH   LEARNER PREFERENCE PRIMARY READING LISTENING DEMONSTRATION     - READING -     - DEMONSTRATION -   ANSWERED BY pat Patient -   RELATIONSHIP SELF SELF -       Abuse Screening:  Abuse Screening Questionnaire 7/31/2018 7/22/2015   Do you ever feel afraid of your partner? N N   Are you in a relationship with someone who physically or mentally threatens you? N N   Is it safe for you to go home?  Linda Stratton       ADL Assessment:  ADL Assessment 7/31/2018   Feeding yourself No Help Needed   Getting from bed to chair No Help Needed   Getting dressed No Help Needed   Bathing or showering No Help Needed   Walk across the room (includes cane/walker) No Help Needed   Using the telphone No Help Needed   Taking your medications No Help Needed   Preparing meals No Help Needed   Managing money (expenses/bills) No Help Needed   Moderately strenuous housework (laundry) No Help Needed   Shopping for personal items (toiletries/medicines) No Help Needed   Shopping for groceries No Help Needed   Driving No Help Needed   Climbing a flight of stairs No Help Needed   Getting to places beyond walking distances No Help Needed       Fall Risk:  Fall Risk Assessment, last 12 mths 7/31/2018 6/18/2018 2/12/2018 11/7/2017 8/11/2017 4/20/2017 3/25/2017   Able to walk? Yes Yes Yes Yes Yes Yes Yes   Fall in past 12 months? No No No No No No No       Health Maintenance reviewed and discussed per provider. Yes    Thomas Waggoner is due for   Health Maintenance Due   Topic Date Due    EYE EXAM RETINAL OR DILATED Q1  10[birth year]13/1955    GLAUCOMA SCREENING Q2Y  10/13/2010    HEMOGLOBIN A1C Q6M  07/29/2018    MICROALBUMIN Q1  08/11/2018    COLONOSCOPY  12/30/2018     Please order/place referral if appropriate. Advance Directive:  1. Do you have an advance directive in place? Patient Reply: no    2. If not, would you like material regarding how to put one in place? Patient Reply: no    Coordination of Care:  1. Have you been to the ER, urgent care clinic since your last visit? Hospitalized since your last visit? no    2. Have you seen or consulted any other health care providers outside of the [facility] since your last visit? Include any pap smears or colon screening.  no

## 2018-07-31 NOTE — TELEPHONE ENCOUNTER
Contacted pt optometry office. 2 pt identifiers confirmed. Optometry staff states will fax over last eye exam to PCP office at [phone]. No further questions or concerns at this time.

## 2018-07-31 NOTE — MR AVS SNAPSHOT
[facility] 
 
 
 [address] [address] 
[phone] Patient: Sneha Fox MRN: [id number] :1945 Visit Information Date & Time Provider Department Dept. Phone Encounter #  
 2018  2:15 PM [employer] [phone] [id number] Follow-up Instructions Return in about 6 months (around 2019) for Medicare Wellness Visit, HTN, DM, cholesterol. Your Appointments 2018  2:30 PM  
New Patient with William Conde MD  
[facility]) Appt Note: Pt saw Dr Julian Rivero on television/Thinks she has GERD [address] [facility] [address]  
  
   
 [address] [facility] [address] Upcoming Health Maintenance Date Due  
 EYE EXAM RETINAL OR DILATED Q1 10[birth year]13/1955 GLAUCOMA SCREENING Q2Y 10/13/2010 HEMOGLOBIN A1C Q6M 2018 MICROALBUMIN Q1 2018 COLONOSCOPY 2018 Influenza Age 5 to Adult 2018 MEDICARE YEARLY EXAM 2018 FOOT EXAM Q1 2019 LIPID PANEL Q1 2019 BREAST CANCER SCRN MAMMOGRAM 2019 DTaP/Tdap/Td series (2 - Td) 2026 Allergies as of 2018  Review Complete On: 2018 By: Susy Anderson MD  
  
 Severity Noted Reaction Type Reactions Pcn [Penicillins] High 2011    Anaphylaxis, Hives Other reaction(s): anaphylaxis/angioedema Venom-honey Bee High 2013   Side Effect Anaphylaxis Current Immunizations  Reviewed on 2018 Name Date Influenza High Dose Vaccine PF 2018, 2016 Influenza Vaccine 2008 12:00 AM  
 Influenza Vaccine Split 2011, 10/19/2010 11:07 AM  
 Pneumococcal Conjugate (PCV-13) 2017 TD Vaccine 10/19/2007 Td 2008  Tdap 2016 12:00 AM, 2014  6:50 PM  
 ZZZ-RETIRED (DO NOT USE) Pneumococcal Vaccine (Unspecified Type) 1/3/2012 Zoster 10/19/2008 Not reviewed this visit You Were Diagnosed With   
  
 Codes Comments Type 2 diabetes mellitus without complication, without long-term current use of insulin (HCC)    -  Primary ICD-10-CM: E11.9 ICD-9-CM: 250.00 Dyslipidemia     ICD-10-CM: E78.5 ICD-9-CM: 272.4 Other headache syndrome     ICD-10-CM: G44.89 ICD-9-CM: 339.89 Skin cyst     ICD-10-CM: L72.9 ICD-9-CM: 706. 2 Vitals BP Pulse Temp Resp Height(growth percentile) Weight(growth percentile) 148/82 (BP 1 Location: Right arm, BP Patient Position: Sitting) 70 98.8 °F (37.1 °C) (Oral) 20 5' 8\" (1.727 m) 186 lb 6.4 oz (84.6 kg) SpO2 BMI OB Status Smoking Status 96% 28.34 kg/m2 Postmenopausal Never Smoker Vitals History BMI and BSA Data Body Mass Index Body Surface Area  
 28.34 kg/m 2 2.01 m 2 Preferred Pharmacy Pharmacy Name Phone [facility] [address] [place] [address] [phone] Your Updated Medication List  
  
   
This list is accurate as of 7/31/18  3:05 PM.  Always use your most recent med list.  
  
  
  
  
 albuterol 90 mcg/actuation inhaler Commonly known as:  PROVENTIL HFA, VENTOLIN HFA, PROAIR HFA Take 2 Puffs by inhalation every six (6) hours as needed for Wheezing or Shortness of Breath. aspirin 325 mg tablet Commonly known as:  ASPIRIN Take 2 Tabs by mouth two (2) times a day. baclofen 10 mg tablet Commonly known as:  LIORESAL Take 1 Tab by mouth three (3) times daily as needed. Blood-Glucose Meter monitoring kit Commonly known as:  Genora Keith Use to test blood sugar daily  
  
 cetirizine-psuedoePHEDrine 5-120 mg per tablet Commonly known as:  ZyrTEC-D Take 1 Tab by mouth two (2) times a day. ergocalciferol 50,000 unit capsule Commonly known as:  VITAMIN D2  
 TAKE ONE CAPSULE BY MOUTH ONCE A WEEK  
  
 estradiol 0.1 mg/24 hr  
Commonly known as:  Laura Mussel APPLY ONE PATCH TOPICALLY ONCE A WEEK ON  MONDAY  
  
 fluticasone 50 mcg/actuation nasal spray Commonly known as:  Allyn Jumper 2 Sprays by Both Nostrils route daily. Indications: ALLERGIC RHINITIS  
  
 glucose blood VI test strips strip Commonly known as:  ONETOUCH ULTRA TEST Use to test blood sugar daily  
  
 inhalational spacing device Use spacer every time you use your inhaler Lancets Misc Commonly known as: One Touch Edythe Randell Use to test blood sugar once daily Lancing Device with Lancets Kit Commonly known as: One Touch Edythe Randell Use to test blood sugar daily  
  
 metFORMIN  mg tablet Commonly known as:  GLUCOPHAGE XR  
TAKE ONE TABLET BY MOUTH ONCE DAILY WITH SUPPER  
  
 multivitamin tablet Commonly known as:  ONE A DAY Take 1 Tab by mouth daily. NEXIUM PO Take  by mouth as needed. rOPINIRole 0.5 mg tablet Commonly known as:  Cherl Beth Take 1 Tab by mouth two (2) times daily as needed. Indications: Restless Legs Syndrome  
  
 simvastatin 20 mg tablet Commonly known as:  ZOCOR  
TAKE ONE TABLET BY MOUTH AT BEDTIME  Indications: hyperlipidemia  
  
 traMADol 50 mg tablet Commonly known as:  ULTRAM  
TAKE ONE TABLET BY MOUTH EVERY EIGHT HOURS AS NEEDED FOR PAIN  Indications: Pain Follow-up Instructions Return in about 6 months (around 1/31/2019) for Medicare Wellness Visit, HTN, DM, cholesterol. To-Do List   
 07/31/2018 Imaging:  CT HEAD WO CONT   
  
 07/31/2018 Lab:  HEMOGLOBIN A1C W/O EAG   
  
 07/31/2018 Lab:  LIPID PANEL   
  
 07/31/2018 Lab:  METABOLIC PANEL, COMPREHENSIVE   
  
 07/31/2018 Lab:  MICROALBUMIN, UR, RAND W/ MICROALB/CREAT RATIO Referral Information Referral ID Referred By Referred To  
  
 [id number] [facility] Not Available Visits Status Start Date End Date 1 New Request 7/31/18 7/31/19 If your referral has a status of pending review or denied, additional information will be sent to support the outcome of this decision. Introducing [facility]! Dear Angela Melendez: Thank you for requesting a [employer] account. Our records indicate that you already have an active [employer] account. You can access your account anytime at [email or web address] Did you know that you can access your hospital and ER discharge instructions at any time in [employer]? You can also review all of your test results from your hospital stay or ER visit. Additional Information If you have questions, please visit the Frequently Asked Questions section of the [employer] website at [email or web address] Remember, [employer] is NOT to be used for urgent needs. For medical emergencies, dial 911. Now available from your iPhone and Android! Please provide this summary of care documentation to your next provider. Your primary care clinician is listed as [facility]. If you have any questions after today's visit, please call [phone].

## 2018-07-31 NOTE — PROGRESS NOTES
HISTORY OF PRESENT ILLNESS  Brad Westfall is a 67 y.o. female. Visit Vitals    /82 (BP 1 Location: Right arm, BP Patient Position: Sitting)    Pulse 70    Temp 98.8 °F (37.1 °C) (Oral)    Resp 20    Ht 5' 8\" (1.727 m)    Wt 186 lb 6.4 oz (84.6 kg)    SpO2 96%    BMI 28.34 kg/m2       HPI Comments: Pt states she ws here for her \"physical.\" Advised her that Medicare does not cover physical exams. I recall telling her the same thing last year. I then explained that Medicare covers a Wellness Visit--but that no Exam is required or done. She still reports pain and swelling in the right axilla. Pt says Dr. Trung Torre did an ultrasound in his office and told her it was nothing. An u/s was ordered in February for Depaul but I do not see where she was even called about. See Cecilia Naqvi last note re office ultrasound  She seems him regularly      She is not due fot her [address] until next visit      She asked about PAP smears--she has had a hysterectomy  She asked about her heart. And if there is any test for pancreas    She has a skin lesion on her back she would want checked      She still get headaches on top of head out of the blue that are \"awesome\" when they occur. No burning or throbbing. Just ache and hurt \"really really bad. \"        Diabetes   The history is provided by the patient. This is a chronic problem. The current episode started more than 1 week ago. The problem occurs daily. The problem has not changed since onset. Exacerbated by: diet. The symptoms are relieved by medications (diet). Cholesterol Problem   The history is provided by the patient. This is a chronic problem. The current episode started more than 1 week ago. The problem occurs daily. Exacerbated by: diet. Relieved by: diet. Treatments tried: diet. The treatment provided moderate relief. Review of Systems   Constitutional: Positive for malaise/fatigue. Negative for chills and fever.    Respiratory: Negative. Cardiovascular: Negative. Neurological: Negative. Physical Exam   Constitutional: She is oriented to person, place, and time. She appears well-developed and well-nourished. No distress. HENT:   Right Ear: Tympanic membrane, external ear and ear canal normal.   Left Ear: Tympanic membrane, external ear and ear canal normal.   Mouth/Throat: Uvula is midline, oropharynx is clear and moist and mucous membranes are normal.   Eyes: Conjunctivae and EOM are normal. Right eye exhibits no discharge. Left eye exhibits no discharge. Cardiovascular: Normal rate and regular rhythm. Pulmonary/Chest: Effort normal and breath sounds normal.   Musculoskeletal: She exhibits no edema. Lymphadenopathy:     She has no cervical adenopathy. Neurological: She is alert and oriented to person, place, and time. Scalp non tender. No photophobia. No pain along temples. Pupils equal and reactive. Slight TMJ movement bilat   Skin: Skin is warm and dry. She is not diaphoretic. Psychiatric: She has a normal mood and affect. Nursing note and vitals reviewed. ASSESSMENT and PLAN    ICD-10-CM ICD-9-CM    1. Type 2 diabetes mellitus without complication, without long-term current use of insulin ([facility]) C76.7 392.28 METABOLIC PANEL, COMPREHENSIVE      HEMOGLOBIN A1C W/O EAG      MICROALBUMIN, UR, RAND W/ MICROALB/CREAT RATIO   2. Dyslipidemia E78.5 272.4 LIPID PANEL   3. Other headache syndrome G44.89 339.89 CT HEAD WO CONT   4. Skin cyst L72.9 706.2        Doing OK for today    BP up slightly systolic. Headache pattern has changed. Will request CT. Last one done over 2 yrs ago was OK    Discussed BMI/weight, lifestyle, diet and exercise. Discussed effect on blood pressure, blood sugar, and joints especially  Focus on limiting white carbs, portion control, and moving more.     F/u 6 months for MWV, DM, HTN, chol

## 2018-08-01 LAB
CREAT UR-MCNC: 78.25 MG/DL (ref 30–125)
MICROALBUMIN UR-MCNC: 0.4 MG/DL (ref 0–3)
MICROALBUMIN/CREAT UR-RTO: <5 MG/G (ref 0–30)

## 2018-08-07 ENCOUNTER — HOSPITAL ENCOUNTER (OUTPATIENT)
Dept: CT IMAGING | Age: 73
Discharge: HOME | End: 2018-08-07
Attending: INTERNAL MEDICINE
Payer: MEDICARE

## 2018-08-07 ENCOUNTER — OFFICE VISIT (OUTPATIENT)
Dept: SURGERY | Age: 73
End: 2018-08-07

## 2018-08-07 VITALS
DIASTOLIC BLOOD PRESSURE: 78 MMHG | SYSTOLIC BLOOD PRESSURE: 139 MMHG | TEMPERATURE: 98 F | OXYGEN SATURATION: 96 % | WEIGHT: 187 LBS | RESPIRATION RATE: 16 BRPM | HEIGHT: 68 IN | HEART RATE: 70 BPM | BODY MASS INDEX: 28.34 KG/M2

## 2018-08-07 DIAGNOSIS — K21.9 GASTROESOPHAGEAL REFLUX DISEASE, ESOPHAGITIS PRESENCE NOT SPECIFIED: Primary | ICD-10-CM

## 2018-08-07 DIAGNOSIS — G44.89 OTHER HEADACHE SYNDROME: ICD-10-CM

## 2018-08-07 PROCEDURE — 70450 CT HEAD/BRAIN W/O DYE: CPT

## 2018-08-07 NOTE — MR AVS SNAPSHOT
[address] [id number] [phone] Patient: Sophia Tovar MRN: [id number] :1945 Visit Information Date & Time Provider Department Dept. Phone Encounter #  
 2018  1:00 PM Gema Saul [facility] [phone] [id number] Upcoming Health Maintenance Date Due  
 EYE EXAM RETINAL OR DILATED Q1 10/13/1955 GLAUCOMA SCREENING Q2Y 10/13/2010 Influenza Age 5 to Adult 2018 COLONOSCOPY 2018 MEDICARE YEARLY EXAM 2018 FOOT EXAM Q1 2019 HEMOGLOBIN A1C Q6M 2019 MICROALBUMIN Q1 2019 LIPID PANEL Q1 2019 BREAST CANCER SCRN MAMMOGRAM 2019 DTaP/Tdap/Td series (2 - Td) 2026 Allergies as of 2018  Review Complete On: 2018 By: Jorden Verma LPN Severity Noted Reaction Type Reactions Pcn [Penicillins] High 2011    Anaphylaxis, Hives Other reaction(s): anaphylaxis/angioedema Venom-honey Bee High 2013   Side Effect Anaphylaxis Current Immunizations  Reviewed on 2018 Name Date Influenza High Dose Vaccine PF 2018, 2016 Influenza Vaccine 2008 12:00 AM  
 Influenza Vaccine Split 2011, 10/19/2010 11:07 AM  
 Pneumococcal Conjugate (PCV-13) 2017 TD Vaccine 10/19/2007 Td 2008 Tdap 2016 12:00 AM, 2014  6:50 PM  
 ZZZ-RETIRED (DO NOT USE) Pneumococcal Vaccine (Unspecified Type) 1/3/2012 Zoster 10/19/2008 Not reviewed this visit You Were Diagnosed With   
  
 Codes Comments Gastroesophageal reflux disease, esophagitis presence not specified    -  Primary ICD-10-CM: K21.9 ICD-9-CM: 530.81 Vitals BP Pulse Temp Resp Height(growth percentile) Weight(growth percentile)  139/78 (BP 1 Location: Right arm, BP Patient Position: Sitting) 70 98 °F (36.7 °C) (Oral) 16 5' 8\" (1.727 m) 187 lb (84.8 kg) SpO2 BMI OB Status Smoking Status 96% 28.43 kg/m2 Postmenopausal Never Smoker BMI and BSA Data Body Mass Index Body Surface Area  
 28.43 kg/m 2 2.02 m 2 Preferred Pharmacy Pharmacy Name Phone [facility] [address] [place] [address] [phone] Your Updated Medication List  
  
   
This list is accurate as of 8/7/18  1:41 PM.  Always use your most recent med list.  
  
  
  
  
 albuterol 90 mcg/actuation inhaler Commonly known as:  PROVENTIL HFA, VENTOLIN HFA, PROAIR HFA Take 2 Puffs by inhalation every six (6) hours as needed for Wheezing or Shortness of Breath. aspirin 325 mg tablet Commonly known as:  ASPIRIN Take 2 Tabs by mouth two (2) times a day. baclofen 10 mg tablet Commonly known as:  LIORESAL Take 1 Tab by mouth three (3) times daily as needed. Blood-Glucose Meter monitoring kit Commonly known as:  Anup Wisnlow Use to test blood sugar daily  
  
 cetirizine-psuedoePHEDrine 5-120 mg per tablet Commonly known as:  ZyrTEC-D Take 1 Tab by mouth two (2) times a day. ergocalciferol 50,000 unit capsule Commonly known as:  VITAMIN D2  
TAKE ONE CAPSULE BY MOUTH ONCE A WEEK  
  
 estradiol 0.1 mg/24 hr  
Commonly known as:  CLIMARA APPLY ONE PATCH TOPICALLY ONCE A WEEK ON  MONDAY  
  
 fluticasone 50 mcg/actuation nasal spray Commonly known as:  Alfonso Cyphers 2 Sprays by Both Nostrils route daily. Indications: ALLERGIC RHINITIS  
  
 glucose blood VI test strips strip Commonly known as:  ONETOUCH ULTRA TEST Use to test blood sugar daily  
  
 inhalational spacing device Use spacer every time you use your inhaler Lancets Misc Commonly known as: One Touch [place] Use to test blood sugar once daily Lancing Device with Lancets Kit Commonly known as: One Touch [place] Use to test blood sugar daily metFORMIN  mg tablet Commonly known as:  GLUCOPHAGE XR  
TAKE ONE TABLET BY MOUTH ONCE DAILY WITH SUPPER  
  
 multivitamin tablet Commonly known as:  ONE A DAY Take 1 Tab by mouth daily. NEXIUM PO Take  by mouth as needed. rOPINIRole 0.5 mg tablet Commonly known as:  Leatha Glaze Take 1 Tab by mouth two (2) times daily as needed. Indications: Restless Legs Syndrome  
  
 simvastatin 20 mg tablet Commonly known as:  ZOCOR  
TAKE ONE TABLET BY MOUTH AT BEDTIME  Indications: hyperlipidemia  
  
 traMADol 50 mg tablet Commonly known as:  ULTRAM  
TAKE ONE TABLET BY MOUTH EVERY EIGHT HOURS AS NEEDED FOR PAIN  Indications: Pain To-Do List   
 08/07/2018  3:30 PM  
  Appointment with [facility] CT RM 1 at [facility] RAD CT ([phone]) GENERAL INSTRUCTIONS  This study does not require you to drink contrast prior to your study. Drink plenty of fluids prior to your exam.   RELATED STUDY INFORMATION  Bring any films, CD's, and reports related with you on the day of your exam.  This only includes studies done outside of [address] [place] [address], [facility], [facility], and [facility]. QUESTIONS  Notify the CT Department if you have any questions concerning your study. [facility] - [phone] [facility] - [phone] Patient Instructions If you have any questions or concerns about today's appointment, the verbal and/or written instructions you were given for follow up care, please call our office at [phone]. [facility] - [address], [address] 
 
[phone] office [phone] Introducing [facility]! Dear Daren Monahan: Thank you for requesting a [employer] account. Our records indicate that you already have an active [employer] account. You can access your account anytime at [email or web address] Did you know that you can access your hospital and ER discharge instructions at any time in [employer]? You can also review all of your test results from your hospital stay or ER visit. Additional Information If you have questions, please visit the Frequently Asked Questions section of the [employer] website at [email or web address] Remember, [employer] is NOT to be used for urgent needs. For medical emergencies, dial 911. Now available from your iPhone and Android! Please provide this summary of care documentation to your next provider. Your primary care clinician is listed as [facility]. If you have any questions after today's visit, please call [phone].
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
## 2018-08-07 NOTE — PATIENT INSTRUCTIONS
If you have any questions or concerns about today's appointment, the verbal and/or written instructions you were given for follow up care, please call our office at [phone].     [facility] - [address]    [phone] office  [phone]

## 2018-10-03 ENCOUNTER — OFFICE VISIT (OUTPATIENT)
Dept: INTERNAL MEDICINE CLINIC | Age: 73
End: 2018-10-03

## 2018-10-03 VITALS
WEIGHT: 189 LBS | OXYGEN SATURATION: 95 % | BODY MASS INDEX: 28.64 KG/M2 | HEART RATE: 75 BPM | DIASTOLIC BLOOD PRESSURE: 71 MMHG | SYSTOLIC BLOOD PRESSURE: 142 MMHG | TEMPERATURE: 96.8 F | HEIGHT: 68 IN | RESPIRATION RATE: 16 BRPM

## 2018-10-03 DIAGNOSIS — R51.9 HEADACHE DISORDER: Primary | ICD-10-CM

## 2018-10-03 DIAGNOSIS — M79.602 LEFT ARM PAIN: ICD-10-CM

## 2018-10-03 DIAGNOSIS — M54.2 NECK PAIN: ICD-10-CM

## 2018-10-03 RX ORDER — TIZANIDINE 4 MG/1
4 TABLET ORAL
Qty: 30 TAB | Refills: 1 | Status: SHIPPED | OUTPATIENT
Start: 2018-10-03 | End: 2019-03-22 | Stop reason: SDUPTHER

## 2018-10-03 NOTE — PROGRESS NOTES
ROOM # [address] [facility] presents today for   Chief Complaint   Patient presents with    Headache     daily for the last few weeks    Shoulder Pain       Allen Montero preferred language for health care discussion is english/other. Is someone accompanying this pt? No    Is the patient using any DME equipment during OV? No    Depression Screening:  PHQ over the last two weeks 10/3/2018 7/31/2018 6/18/2018 2/12/2018 11/7/2017 8/11/2017 4/20/2017   PHQ Not Done - - - - - - -   Little interest or pleasure in doing things Not at all Not at all Not at all Not at all Not at all Not at all Not at all   Feeling down, depressed, irritable, or hopeless Not at all Not at all Not at all Not at all Not at all Not at all Not at all   Total Score PHQ 2 0 0 0 0 0 0 0       Learning Assessment:  Learning Assessment 1/22/2015 11/21/2014 4/4/2014   PRIMARY LEARNER Patient Patient Patient   HIGHEST LEVEL OF EDUCATION - PRIMARY LEARNER  - GRADUATED HIGH SCHOOL OR GED -   BARRIERS PRIMARY LEARNER NONE NONE -   PRIMARY LANGUAGE ENGLISH ENGLISH ENGLISH   LEARNER PREFERENCE PRIMARY READING LISTENING DEMONSTRATION     - READING -     - DEMONSTRATION -   ANSWERED BY pat Patient -   RELATIONSHIP SELF SELF -       Abuse Screening:  Abuse Screening Questionnaire 7/31/2018 7/22/2015   Do you ever feel afraid of your partner? N N   Are you in a relationship with someone who physically or mentally threatens you? N N   Is it safe for you to go home? Y Y       Fall Risk  Fall Risk Assessment, last 12 mths 10/3/2018 7/31/2018 6/18/2018 2/12/2018 11/7/2017 8/11/2017 4/20/2017   Able to walk? Yes Yes Yes Yes Yes Yes Yes   Fall in past 12 months? No No No No No No No       Health Maintenance reviewed and discussed per provider.  Yes    Allen Montero is due for   Health Maintenance Due   Topic Date Due    Shingrix Vaccine Age 50> (1 of 2) 10/13/1995    Influenza Age 5 to Adult  08/01/2018    MEDICARE YEARLY EXAM  08/12/2018  COLONOSCOPY  12/30/2018     Please order/place referral if appropriate. Advance Directive:  1. Do you have an advance directive in place? Patient Reply: No    2. If not, would you like material regarding how to put one in place? Patient Reply: No    Coordination of Care:  1. Have you been to the ER, urgent care clinic since your last visit? Hospitalized since your last visit? No    2. Have you seen or consulted any other health care providers outside of the [address] [place] [address] since your last visit? Include any pap smears or colon screening.  No

## 2018-10-03 NOTE — MR AVS SNAPSHOT
[address] [place] [address] 
 
 
 [address] [facility] [address] [id number] 
[phone] Patient: Teresa Wesley MRN: [id number] :1945 Visit Information Date & Time Provider Department Dept. Phone Encounter #  
 10/3/2018  3:45 PM [address] [phone] [id number] Follow-up Instructions Return in about 2 weeks (around 10/17/2018) for headache, neck pain. Upcoming Health Maintenance Date Due Shingrix Vaccine Age 50> (1 of 2) 10/13/1995 Influenza Age 5 to Adult 2018 MEDICARE YEARLY EXAM 2018 COLONOSCOPY 2018 FOOT EXAM Q1 2019 HEMOGLOBIN A1C Q6M 2019 EYE EXAM RETINAL OR DILATED Q1 2019 MICROALBUMIN Q1 2019 LIPID PANEL Q1 2019 BREAST CANCER SCRN MAMMOGRAM 2019 GLAUCOMA SCREENING Q2Y 2020 DTaP/Tdap/Td series (2 - Td) 2026 Allergies as of 10/3/2018  Review Complete On: 10/3/2018 By: Duc Hernandez MD  
  
 Severity Noted Reaction Type Reactions Pcn [Penicillins] High 2011    Anaphylaxis, Hives Other reaction(s): anaphylaxis/angioedema Venom-honey Bee High 2013   Side Effect Anaphylaxis Current Immunizations  Reviewed on 2018 Name Date Influenza High Dose Vaccine PF 2018, 2016 Influenza Vaccine 2008 12:00 AM  
 Influenza Vaccine Split 2011, 10/19/2010 11:07 AM  
 Pneumococcal Conjugate (PCV-13) 2017 TD Vaccine 10/19/2007 Td 2008 Tdap 2016 12:00 AM, 2014  6:50 PM  
 ZZZ-RETIRED (DO NOT USE) Pneumococcal Vaccine (Unspecified Type) 1/3/2012 Zoster 10/19/2008 Not reviewed this visit You Were Diagnosed With   
  
 Codes Comments Headache disorder    -  Primary ICD-10-CM: R46 ICD-9-CM: 784.0 Neck pain     ICD-10-CM: M54.2 ICD-9-CM: 723.1 Left arm pain     ICD-10-CM: M79.602 ICD-9-CM: 729.5 Vitals BP Pulse Temp Resp Height(growth percentile) Weight(growth percentile) 142/71 (BP 1 Location: Right arm, BP Patient Position: Sitting) 75 96.8 °F (36 °C) (Oral) 16 5' 8\" (1.727 m) 189 lb (85.7 kg) SpO2 BMI OB Status Smoking Status 95% 28.74 kg/m2 Postmenopausal Never Smoker Vitals History BMI and BSA Data Body Mass Index Body Surface Area 28.74 kg/m 2 2.03 m 2 Preferred Pharmacy Pharmacy Name Phone [facility] [address] [place] [address] [phone] Your Updated Medication List  
  
   
This list is accurate as of 10/3/18  4:10 PM.  Always use your most recent med list.  
  
  
  
  
 albuterol 90 mcg/actuation inhaler Commonly known as:  PROVENTIL HFA, VENTOLIN HFA, PROAIR HFA Take 2 Puffs by inhalation every six (6) hours as needed for Wheezing or Shortness of Breath. aspirin 325 mg tablet Commonly known as:  ASPIRIN Take 2 Tabs by mouth two (2) times a day. Blood-Glucose Meter monitoring kit Commonly known as:  Eros Jimenez Use to test blood sugar daily  
  
 cetirizine-psuedoePHEDrine 5-120 mg per tablet Commonly known as:  ZyrTEC-D Take 1 Tab by mouth two (2) times a day. ergocalciferol 50,000 unit capsule Commonly known as:  VITAMIN D2  
TAKE ONE CAPSULE BY MOUTH ONCE A WEEK  
  
 estradiol 0.1 mg/24 hr  
Commonly known as:  CLIMARA APPLY ONE PATCH TOPICALLY ONCE A WEEK ON  MONDAY  
  
 fluticasone 50 mcg/actuation nasal spray Commonly known as:  Verenice Parlor 2 Sprays by Both Nostrils route daily. Indications: ALLERGIC RHINITIS  
  
 glucose blood VI test strips strip Commonly known as:  ONETOUCH ULTRA TEST Use to test blood sugar daily  
  
 inhalational spacing device Use spacer every time you use your inhaler Lancets Misc Commonly known as: One Touch Jammie Victoria Use to test blood sugar once daily Lancing Device with Lancets Kit Commonly known as: One Touch Jammie Victoria Use to test blood sugar daily  
  
 metFORMIN  mg tablet Commonly known as:  GLUCOPHAGE XR  
TAKE ONE TABLET BY MOUTH ONCE DAILY WITH SUPPER  
  
 multivitamin tablet Commonly known as:  ONE A DAY Take 1 Tab by mouth daily. NEXIUM PO Take  by mouth as needed. rOPINIRole 0.5 mg tablet Commonly known as:  Merian Sensor Take 1 Tab by mouth two (2) times daily as needed. Indications: Restless Legs Syndrome  
  
 simvastatin 20 mg tablet Commonly known as:  ZOCOR  
TAKE ONE TABLET BY MOUTH AT BEDTIME  Indications: hyperlipidemia  
  
 tiZANidine 4 mg tablet Commonly known as:  Nyra Joel Take 1 Tab by mouth nightly as needed. Indications: Muscle Spasm, Muscle Spasticity of Spinal Origin  
  
 traMADol 50 mg tablet Commonly known as:  ULTRAM  
TAKE ONE TABLET BY MOUTH EVERY EIGHT HOURS AS NEEDED FOR PAIN  Indications: Pain Prescriptions Sent to Pharmacy Refills  
 tiZANidine (ZANAFLEX) 4 mg tablet 1 Sig: Take 1 Tab by mouth nightly as needed. Indications: Muscle Spasm, Muscle Spasticity of Spinal Origin Class: Normal  
 Pharmacy: [facility] DR JULEE ROY [address] [place] [address].  #: [phone] Route: Oral  
  
Follow-up Instructions Return in about 2 weeks (around 10/17/2018) for headache, neck pain. To-Do List   
 10/03/2018 Imaging:  MRI CERV SPINE WO CONT Referral Information Referral ID Referred By Referred To  
  
 [id number] [facility] [place] 2[place] Not Available Visits Status Start Date End Date 1 New Request 10/3/18 10/3/19 If your referral has a status of pending review or denied, additional information will be sent to support the outcome of this decision. Introducing [facility]! Dear Kel Cano: Thank you for requesting a [employer] account. Our records indicate that you already have an active [employer] account. You can access your account anytime at [email or web address] Did you know that you can access your hospital and ER discharge instructions at any time in [employer]? You can also review all of your test results from your hospital stay or ER visit. Additional Information If you have questions, please visit the Frequently Asked Questions section of the [employer] website at [email or web address] Remember, [employer] is NOT to be used for urgent needs. For medical emergencies, dial 911. Now available from your iPhone and Android! Please provide this summary of care documentation to your next provider. Your primary care clinician is listed as [facility]. If you have any questions after today's visit, please call [phone].

## 2018-10-03 NOTE — PROGRESS NOTES
HISTORY OF PRESENT ILLNESS  Conception Feng is a 67 y.o. female. Visit Vitals    /71 (BP 1 Location: Right arm, BP Patient Position: Sitting)    Pulse 75    Temp 96.8 °F (36 °C) (Oral)    Resp 16    Ht 5' 8\" (1.727 m)    Wt 189 lb (85.7 kg)    SpO2 95%    BMI 28.74 kg/m2       HPI Comments: Top of left side of head. Constant. Sometimes goes away. Not pounding. Coming more frequently. 2-3 times a week. Usually in the evening. She is unaware of any changes. When she falls asleep in her Lazy Boy on her back, she will often awaken with a headache. She had a brain CT scan last month was OK    Headache   The history is provided by the patient. This is a new problem. The current episode started more than 1 week ago. The problem occurs daily. Associated symptoms include headaches. Exacerbated by: unknown. She has tried nothing for the symptoms. Shoulder Pain    History provided by: this is an extension of her neck pain and has been chronic for years. Review of Systems   Constitutional: Negative for chills and fever. Musculoskeletal: Positive for joint pain. Neurological: Positive for headaches. Physical Exam   Constitutional: She is oriented to person, place, and time. She appears well-developed and well-nourished. No distress. Cardiovascular: Normal rate. Pulmonary/Chest: Effort normal.   Neurological: She is alert and oriented to person, place, and time. No cranial nerve deficit. Skin: Skin is warm and dry. She is not diaphoretic. Psychiatric: She has a normal mood and affect. Nursing note and vitals reviewed. ASSESSMENT and PLAN    ICD-10-CM ICD-9-CM    1. Headache disorder R51 784.0 MRI CERV SPINE WO CONT   2. Neck pain M54.2 723.1 MRI CERV SPINE WO CONT   3.  Left arm pain M79.602 729.5 MRI CERV SPINE WO CONT       Given her old neck injury and the neck pain, I am concerned that these are spinal headaches  Will request MRI of c-spine    She does not want a lot of medication  But agrees to trial of tizanidine at night  OK to use some naproxen--again, pt reluctant to take anything    F/u 2 weeks

## 2018-11-19 DIAGNOSIS — Z76.0 MEDICATION REFILL: ICD-10-CM

## 2018-11-19 RX ORDER — SIMVASTATIN 20 MG/1
TABLET, FILM COATED ORAL
Qty: 30 TAB | Refills: 17 | Status: SHIPPED | OUTPATIENT
Start: 2018-11-19 | End: 2019-01-05 | Stop reason: SDUPTHER

## 2018-11-19 RX ORDER — ERGOCALCIFEROL 1.25 MG/1
CAPSULE ORAL
Qty: 12 CAP | Refills: 5 | Status: SHIPPED | OUTPATIENT
Start: 2018-11-19 | End: 2019-11-20 | Stop reason: SDUPTHER

## 2018-11-19 RX ORDER — ESTRADIOL 0.1 MG/D
PATCH TRANSDERMAL
Qty: 4 PATCH | Refills: 17 | Status: SHIPPED | OUTPATIENT
Start: 2018-11-19 | End: 2019-06-18 | Stop reason: SDUPTHER

## 2018-12-19 ENCOUNTER — OFFICE VISIT (OUTPATIENT)
Dept: INTERNAL MEDICINE CLINIC | Age: 73
End: 2018-12-19

## 2018-12-19 ENCOUNTER — HOSPITAL ENCOUNTER (OUTPATIENT)
Dept: LAB | Age: 73
Discharge: HOME | End: 2018-12-19
Payer: MEDICARE

## 2018-12-19 VITALS
DIASTOLIC BLOOD PRESSURE: 77 MMHG | OXYGEN SATURATION: 95 % | TEMPERATURE: 98.3 F | HEART RATE: 75 BPM | WEIGHT: 188 LBS | HEIGHT: 68 IN | RESPIRATION RATE: 17 BRPM | BODY MASS INDEX: 28.49 KG/M2 | SYSTOLIC BLOOD PRESSURE: 148 MMHG

## 2018-12-19 DIAGNOSIS — R51.9 NONINTRACTABLE HEADACHE, UNSPECIFIED CHRONICITY PATTERN, UNSPECIFIED HEADACHE TYPE: ICD-10-CM

## 2018-12-19 DIAGNOSIS — R10.9 ABDOMINAL DISCOMFORT: ICD-10-CM

## 2018-12-19 DIAGNOSIS — R10.9 ABDOMINAL DISCOMFORT: Primary | ICD-10-CM

## 2018-12-19 LAB
ALBUMIN SERPL-MCNC: 4 G/DL (ref 3.4–5)
ALBUMIN/GLOB SERPL: 1.1 {RATIO} (ref 0.8–1.7)
ALP SERPL-CCNC: 66 U/L (ref 45–117)
ALT SERPL-CCNC: 29 U/L (ref 13–56)
AMYLASE SERPL-CCNC: 30 U/L (ref 25–115)
ANION GAP SERPL CALC-SCNC: 6 MMOL/L (ref 3–18)
AST SERPL-CCNC: 21 U/L (ref 15–37)
BILIRUB SERPL-MCNC: 0.4 MG/DL (ref 0.2–1)
BUN SERPL-MCNC: 12 MG/DL (ref 7–18)
BUN/CREAT SERPL: 15 (ref 12–20)
CALCIUM SERPL-MCNC: 9.1 MG/DL (ref 8.5–10.1)
CHLORIDE SERPL-SCNC: 105 MMOL/L (ref 100–108)
CO2 SERPL-SCNC: 28 MMOL/L (ref 21–32)
CREAT SERPL-MCNC: 0.79 MG/DL (ref 0.6–1.3)
GLOBULIN SER CALC-MCNC: 3.6 G/DL (ref 2–4)
GLUCOSE SERPL-MCNC: 80 MG/DL (ref 74–99)
LIPASE SERPL-CCNC: 185 U/L (ref 73–393)
POTASSIUM SERPL-SCNC: 3.8 MMOL/L (ref 3.5–5.5)
PROT SERPL-MCNC: 7.6 G/DL (ref 6.4–8.2)
SODIUM SERPL-SCNC: 139 MMOL/L (ref 136–145)

## 2018-12-19 PROCEDURE — 83013 H PYLORI (C-13) BREATH: CPT

## 2018-12-19 PROCEDURE — 82150 ASSAY OF AMYLASE: CPT

## 2018-12-19 PROCEDURE — 83690 ASSAY OF LIPASE: CPT

## 2018-12-19 PROCEDURE — 80053 COMPREHEN METABOLIC PANEL: CPT

## 2018-12-19 PROCEDURE — 36415 COLL VENOUS BLD VENIPUNCTURE: CPT

## 2018-12-19 PROCEDURE — 83516 IMMUNOASSAY NONANTIBODY: CPT
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
## 2018-12-19 NOTE — PROGRESS NOTES
[address] presents today for   Chief Complaint   Patient presents with    Headache    Abdominal Pain       Sundeep Huston preferred language for health care discussion is english/other.     Is someone accompanying this pt? no    Is the patient using any DME equipment during OV? no    Depression Screening:  PHQ over the last two weeks 12/19/2018 10/3/2018 7/31/2018 6/18/2018 2/12/2018 11/7/2017 8/11/2017   PHQ Not Done - - - - - - -   Little interest or pleasure in doing things Not at all Not at all Not at all Not at all Not at all Not at all Not at all   Feeling down, depressed, irritable, or hopeless Not at all Not at all Not at all Not at all Not at all Not at all Not at all   Total Score PHQ 2 0 0 0 0 0 0 0   Trouble falling or staying asleep, or sleeping too much Not at all - - - - - -   Feeling tired or having little energy Not at all - - - - - -   Poor appetite, weight loss, or overeating Not at all - - - - - -   Feeling bad about yourself - or that you are a failure or have let yourself or your family down Not at all - - - - - -   Trouble concentrating on things such as school, work, reading, or watching TV Not at all - - - - - -   Moving or speaking so slowly that other people could have noticed; or the opposite being so fidgety that others notice Not at all - - - - - -   Thoughts of being better off dead, or hurting yourself in some way Not at all - - - - - -   PHQ 9 Score 0 - - - - - -       Learning Assessment:  Learning Assessment 1/22/2015 11/21/2014 4/4/2014   PRIMARY LEARNER Patient Patient Patient   HIGHEST LEVEL OF EDUCATION - PRIMARY LEARNER  - GRADUATED HIGH SCHOOL OR GED -   BARRIERS PRIMARY LEARNER NONE NONE -   PRIMARY LANGUAGE ENGLISH ENGLISH ENGLISH   LEARNER PREFERENCE PRIMARY READING LISTENING DEMONSTRATION     - READING -     - DEMONSTRATION -   ANSWERED BY rob Patient -   RELATIONSHIP SELF SELF -       Abuse Screening:  Abuse Screening Questionnaire 7/31/2018 7/22/2015   Do you ever feel afraid of your partner? N N   Are you in a relationship with someone who physically or mentally threatens you? N N   Is it safe for you to go home? Y Y       Fall Risk  Fall Risk Assessment, last 12 mths 12/19/2018 10/3/2018 7/31/2018 6/18/2018 2/12/2018 11/7/2017 8/11/2017   Able to walk? Yes Yes Yes Yes Yes Yes Yes   Fall in past 12 months? No No No No No No No       Health Maintenance reviewed and discussed per provider. Yes    Briseida Aguirre is due for   Health Maintenance Due   Topic Date Due    Shingrix Vaccine Age 50> (1 of 2) 10/13/1995    Influenza Age 5 to Adult  08/01/2018    MEDICARE YEARLY EXAM  08/12/2018    COLONOSCOPY  12/30/2018     Please order/place referral if appropriate. Advance Directive:  1. Do you have an advance directive in place? Patient Reply: no    2. If not, would you like material regarding how to put one in place? Patient Reply: no    Coordination of Care:  1. Have you been to the ER, urgent care clinic since your last visit? Hospitalized since your last visit? no    2. Have you seen or consulted any other health care providers outside of the [address] [place] [address] since your last visit? Include any pap smears or colon screening. no    Patient wants MRI done at [facility]. Faxed order to [employer].

## 2018-12-21 LAB — UREA BREATH TEST QL: NEGATIVE

## 2018-12-24 LAB
GLIADIN PEPTIDE IGA SER-ACNC: 3 UNITS (ref 0–19)
GLIADIN PEPTIDE IGG SER-ACNC: 2 UNITS (ref 0–19)
IGA SERPL-MCNC: 349 MG/DL (ref 64–422)
TTG IGA SER-ACNC: <2 U/ML (ref 0–3)
TTG IGG SER-ACNC: <2 U/ML (ref 0–5)

## 2018-12-27 ENCOUNTER — HOSPITAL ENCOUNTER (OUTPATIENT)
Dept: MRI IMAGING | Age: 73
Discharge: HOME | End: 2018-12-27
Attending: FAMILY MEDICINE
Payer: MEDICARE

## 2018-12-27 DIAGNOSIS — R51.9 NONINTRACTABLE HEADACHE, UNSPECIFIED CHRONICITY PATTERN, UNSPECIFIED HEADACHE TYPE: ICD-10-CM

## 2018-12-27 PROCEDURE — 72141 MRI NECK SPINE W/O DYE: CPT

## 2018-12-31 ENCOUNTER — TELEPHONE (OUTPATIENT)
Dept: INTERNAL MEDICINE CLINIC | Age: 73
End: 2018-12-31

## 2018-12-31 NOTE — PROGRESS NOTES
Please call this patient.  Inform them that her recent lab results for abdominal pain were normal.     Thank you     Dr. Alberto Villegas

## 2018-12-31 NOTE — TELEPHONE ENCOUNTER
----- Message from Sherrie Swanson MD sent at 12/31/2018 11:40 AM EST -----  Please call this patient.  Inform them that her recent lab results for abdominal pain were normal.     Thank you     Dr. Elo Perez

## 2019-01-05 ENCOUNTER — OFFICE VISIT (OUTPATIENT)
Dept: INTERNAL MEDICINE CLINIC | Age: 74
End: 2019-01-05

## 2019-01-05 VITALS
BODY MASS INDEX: 28.34 KG/M2 | WEIGHT: 187 LBS | HEIGHT: 68 IN | HEART RATE: 81 BPM | SYSTOLIC BLOOD PRESSURE: 154 MMHG | DIASTOLIC BLOOD PRESSURE: 77 MMHG | RESPIRATION RATE: 16 BRPM | OXYGEN SATURATION: 96 % | TEMPERATURE: 98.6 F

## 2019-01-05 DIAGNOSIS — R19.4 CHANGE IN BOWEL HABITS: Primary | ICD-10-CM

## 2019-01-05 DIAGNOSIS — R10.13 EPIGASTRIC PAIN: ICD-10-CM

## 2019-01-05 DIAGNOSIS — R13.10 DYSPHAGIA, UNSPECIFIED TYPE: ICD-10-CM

## 2019-01-05 DIAGNOSIS — R35.0 URINARY FREQUENCY: ICD-10-CM

## 2019-01-05 DIAGNOSIS — R39.11 URINARY HESITANCY: ICD-10-CM

## 2019-01-05 NOTE — PROGRESS NOTES
ROOM # Nora Chavez presents today for   Chief Complaint   Patient presents with    Epigastric Pain    Head Pain       Hampton Aba preferred language for health care discussion is english/other. Is someone accompanying this pt? No    Is the patient using any DME equipment during OV?  No    Depression Screening:  PHQ over the last two weeks 1/5/2019 12/19/2018 10/3/2018 7/31/2018 6/18/2018 2/12/2018 11/7/2017   PHQ Not Done - - - - - - -   Little interest or pleasure in doing things Not at all Not at all Not at all Not at all Not at all Not at all Not at all   Feeling down, depressed, irritable, or hopeless Not at all Not at all Not at all Not at all Not at all Not at all Not at all   Total Score PHQ 2 0 0 0 0 0 0 0   Trouble falling or staying asleep, or sleeping too much - Not at all - - - - -   Feeling tired or having little energy - Not at all - - - - -   Poor appetite, weight loss, or overeating - Not at all - - - - -   Feeling bad about yourself - or that you are a failure or have let yourself or your family down - Not at all - - - - -   Trouble concentrating on things such as school, work, reading, or watching TV - Not at all - - - - -   Moving or speaking so slowly that other people could have noticed; or the opposite being so fidgety that others notice - Not at all - - - - -   Thoughts of being better off dead, or hurting yourself in some way - Not at all - - - - -   PHQ 9 Score - 0 - - - - -       Learning Assessment:  Learning Assessment 1/22/2015 11/21/2014 4/4/2014   PRIMARY LEARNER Patient Patient Patient   HIGHEST LEVEL OF EDUCATION - PRIMARY LEARNER  - GRADUATED HIGH SCHOOL OR GED -   BARRIERS PRIMARY LEARNER NONE NONE -   PRIMARY LANGUAGE ENGLISH ENGLISH ENGLISH   LEARNER PREFERENCE PRIMARY READING LISTENING DEMONSTRATION     - READING -     - DEMONSTRATION -   ANSWERED BY pat Patient -   RELATIONSHIP SELF SELF -       Abuse Screening:  Abuse Screening Questionnaire 7/31/2018 7/22/2015   Do you ever feel afraid of your partner? N N   Are you in a relationship with someone who physically or mentally threatens you? N N   Is it safe for you to go home? Y Y       Fall Risk  Fall Risk Assessment, last 12 mths 1/5/2019 12/19/2018 10/3/2018 7/31/2018 6/18/2018 2/12/2018 11/7/2017   Able to walk? Yes Yes Yes Yes Yes Yes Yes   Fall in past 12 months? No No No No No No No       Health Maintenance reviewed and discussed per provider. Yes    Shlomo Roche is due for   Health Maintenance Due   Topic Date Due    Shingrix Vaccine Age 50> (1 of 2) 10/13/1995    Influenza Age 5 to Adult  08/01/2018    MEDICARE YEARLY EXAM  08/12/2018    COLONOSCOPY  12/30/2018    FOOT EXAM Q1  01/29/2019    HEMOGLOBIN A1C Q6M  01/31/2019     Please order/place referral if appropriate. Advance Directive:  1. Do you have an advance directive in place? Patient Reply: No    2. If not, would you like material regarding how to put one in place? Patient Reply: No    Coordination of Care:  1. Have you been to the ER, urgent care clinic since your last visit? Hospitalized since your last visit? No    2. Have you seen or consulted any other health care providers outside of the [address] [place] [address] since your last visit? Include any pap smears or colon screening.  No

## 2019-01-05 NOTE — PROGRESS NOTES
HISTORY OF PRESENT ILLNESS  Eldon Antonio is a 68 y.o. female. Visit Vitals  /77 (BP 1 Location: Right arm, BP Patient Position: Sitting)   Pulse 81   Temp 98.6 °F (37 °C) (Oral)   Resp 16   Ht 5' 8\" (1.727 m)   Wt 187 lb (84.8 kg)   SpO2 96%   BMI 28.43 kg/m²       \"always\" has epigastric pain  Feels like food gets stuck going down    She also has had a change in stools--quality and character. No blood or color change just size and shape. Lower abdominal rumbling and discomfort. Bowels are not regular  Last colonoscopy in 2013      Urgency   The history is provided by the patient (just some dribbling. Occasionaly can't get stream started. Some times dribbles . ). This is a new problem. The problem occurs daily. The problem has not changed since onset. Associated symptoms include abdominal pain. Nothing aggravates the symptoms. Nothing relieves the symptoms. Epigastric Pain    The history is provided by the patient (see comments). This is a recurrent problem. The current episode started more than 1 week ago. The problem occurs daily. The problem has not changed since onset. Associated symptoms include dysuria and frequency. Pertinent negatives include no fever, no constipation and no hematuria. Review of Systems   Constitutional: Negative for chills, fever and weight loss. Gastrointestinal: Positive for abdominal pain. Negative for blood in stool and constipation. Genitourinary: Positive for dysuria, frequency and urgency. Negative for hematuria. Physical Exam   Constitutional: She is oriented to person, place, and time. She appears well-developed and well-nourished. No distress. Cardiovascular: Normal rate. Pulmonary/Chest: Effort normal.   Abdominal: Soft. Normal appearance. She exhibits no mass. There is tenderness. There is no rebound, no guarding and no CVA tenderness. Neurological: She is alert and oriented to person, place, and time. Skin: Skin is warm and dry.  She is not diaphoretic. Psychiatric: She has a normal mood and affect. Nursing note and vitals reviewed. ASSESSMENT and PLAN    ICD-10-CM ICD-9-CM    1. Change in bowel habits R19.4 787.99 REFERRAL TO SURGERY   2. Urinary frequency R35.0 788.41 URINALYSIS W/ RFLX MICROSCOPIC      CULTURE, URINE      REFERRAL TO UROLOGY   3. Urinary hesitancy R39.11 788.64 URINALYSIS W/ RFLX MICROSCOPIC      CULTURE, URINE      REFERRAL TO UROLOGY   4. Epigastric pain R10.13 789.06 XR UPPER GI AIR CONT WO KUB   5. Dysphagia, unspecified type R13.10 787.20 XR UPPER GI AIR CONT WO KUB         Last lab was OK but needs CBC today    Referrals as noted.   I am concerned re the change in bowels with her history  I am concerned re dysphagia    Incidentally discussed her MRI of her c-spine    F/u one month to continue results

## 2019-01-09 LAB
ALBUMIN SERPL-MCNC: 4.3 G/DL (ref 3.5–4.8)
ALBUMIN/GLOB SERPL: 1.3 {RATIO} (ref 1.2–2.2)
ALP SERPL-CCNC: 68 IU/L (ref 39–117)
ALT SERPL-CCNC: 21 IU/L (ref 0–32)
APPEARANCE UR: ABNORMAL
AST SERPL-CCNC: 21 IU/L (ref 0–40)
BACTERIA UR CULT: NORMAL
BASOPHILS # BLD AUTO: 0.1 X10E3/UL (ref 0–0.2)
BASOPHILS NFR BLD AUTO: 1 %
BILIRUB SERPL-MCNC: 0.3 MG/DL (ref 0–1.2)
BILIRUB UR QL STRIP: NEGATIVE
BUN SERPL-MCNC: 10 MG/DL (ref 8–27)
BUN/CREAT SERPL: 12 (ref 12–28)
CALCIUM SERPL-MCNC: 9.5 MG/DL (ref 8.7–10.3)
CHLORIDE SERPL-SCNC: 103 MMOL/L (ref 96–106)
CO2 SERPL-SCNC: 19 MMOL/L (ref 20–29)
COLOR UR: YELLOW
CREAT SERPL-MCNC: 0.81 MG/DL (ref 0.57–1)
EOSINOPHIL # BLD AUTO: 0.1 X10E3/UL (ref 0–0.4)
EOSINOPHIL NFR BLD AUTO: 2 %
ERYTHROCYTE [DISTWIDTH] IN BLOOD BY AUTOMATED COUNT: 13.6 % (ref 12.3–15.4)
GLOBULIN SER CALC-MCNC: 3.2 G/DL (ref 1.5–4.5)
GLUCOSE SERPL-MCNC: 159 MG/DL (ref 65–99)
GLUCOSE UR QL: NEGATIVE
HCT VFR BLD AUTO: 41.2 % (ref 34–46.6)
HGB BLD-MCNC: 14.4 G/DL (ref 11.1–15.9)
HGB UR QL STRIP: NEGATIVE
IMM GRANULOCYTES # BLD AUTO: 0 X10E3/UL (ref 0–0.1)
IMM GRANULOCYTES NFR BLD AUTO: 0 %
KETONES UR QL STRIP: NEGATIVE
LEUKOCYTE ESTERASE UR QL STRIP: NEGATIVE
LYMPHOCYTES # BLD AUTO: 2.4 X10E3/UL (ref 0.7–3.1)
LYMPHOCYTES NFR BLD AUTO: 44 %
MCH RBC QN AUTO: 31.4 PG (ref 26.6–33)
MCHC RBC AUTO-ENTMCNC: 35 G/DL (ref 31.5–35.7)
MCV RBC AUTO: 90 FL (ref 79–97)
MICRO URNS: ABNORMAL
MONOCYTES # BLD AUTO: 0.4 X10E3/UL (ref 0.1–0.9)
MONOCYTES NFR BLD AUTO: 7 %
NEUTROPHILS # BLD AUTO: 2.5 X10E3/UL (ref 1.4–7)
NEUTROPHILS NFR BLD AUTO: 46 %
NITRITE UR QL STRIP: NEGATIVE
PH UR STRIP: 5 [PH] (ref 5–7.5)
PLATELET # BLD AUTO: 230 X10E3/UL (ref 150–379)
POTASSIUM SERPL-SCNC: 4.5 MMOL/L (ref 3.5–5.2)
PROT SERPL-MCNC: 7.5 G/DL (ref 6–8.5)
PROT UR QL STRIP: NEGATIVE
RBC # BLD AUTO: 4.58 X10E6/UL (ref 3.77–5.28)
SODIUM SERPL-SCNC: 142 MMOL/L (ref 134–144)
SP GR UR: 1.02 (ref 1–1.03)
UROBILINOGEN UR STRIP-MCNC: 0.2 MG/DL (ref 0.2–1)
WBC # BLD AUTO: 5.4 X10E3/UL (ref 3.4–10.8)

## 2019-02-04 ENCOUNTER — HOSPITAL ENCOUNTER (OUTPATIENT)
Dept: LAB | Age: 74
Discharge: HOME | End: 2019-02-04

## 2019-02-04 ENCOUNTER — OFFICE VISIT (OUTPATIENT)
Dept: INTERNAL MEDICINE CLINIC | Age: 74
End: 2019-02-04

## 2019-02-04 VITALS
TEMPERATURE: 97.6 F | HEART RATE: 79 BPM | OXYGEN SATURATION: 97 % | BODY MASS INDEX: 27.89 KG/M2 | DIASTOLIC BLOOD PRESSURE: 73 MMHG | SYSTOLIC BLOOD PRESSURE: 150 MMHG | WEIGHT: 184 LBS | RESPIRATION RATE: 12 BRPM | HEIGHT: 68 IN

## 2019-02-04 DIAGNOSIS — E11.9 TYPE 2 DIABETES MELLITUS WITHOUT COMPLICATION, WITHOUT LONG-TERM CURRENT USE OF INSULIN (HCC): Chronic | ICD-10-CM

## 2019-02-04 DIAGNOSIS — R35.0 URINARY FREQUENCY: Primary | ICD-10-CM

## 2019-02-04 DIAGNOSIS — R49.0 HOARSENESS: ICD-10-CM

## 2019-02-04 PROCEDURE — 99001 SPECIMEN HANDLING PT-LAB: CPT

## 2019-02-04 NOTE — PROGRESS NOTES
HISTORY OF PRESENT ILLNESS  Erna Lee is a 68 y.o. female. Visit Vitals  /73   Pulse 79   Temp 97.6 °F (36.4 °C) (Oral)   Resp 12   Ht 5' 8\" (1.727 m)   Wt 184 lb (83.5 kg)   SpO2 97%   BMI 27.98 kg/m²       Since last visit she saw surgery, Dr. Karen Gongora, and will be having an EGD and colonoscopy set up. Breathing Problem   The history is provided by the patient (went outside today without a mask and now has some breathing difficulty. Voice is raspy). This is a new problem. Pertinent negatives include no fever, no cough, no sputum production and no chest pain. Urinary Frequency    The history is provided by the patient (still having frequent urinations). This is a chronic problem. The current episode started more than 1 week ago. The problem occurs intermittently. The problem has not changed since onset. Associated symptoms include frequency. Pertinent negatives include no chills. Review of Systems   Constitutional: Negative for chills and fever. HENT:        Hoarse   Respiratory: Negative for cough and sputum production. Cardiovascular: Negative for chest pain and palpitations. Genitourinary: Positive for frequency. Physical Exam   Constitutional: She is oriented to person, place, and time. She appears well-developed and well-nourished. No distress. HENT:   Right Ear: External ear normal.   Left Ear: External ear normal.   Mouth/Throat: Oropharynx is clear and moist. No oropharyngeal exudate. Hoarse voice   Eyes: Conjunctivae and EOM are normal.   Cardiovascular: Normal rate and regular rhythm. Pulmonary/Chest: Effort normal. No respiratory distress. She has no wheezes. Slight musical sounds centrally   Musculoskeletal: She exhibits no edema. Neurological: She is alert and oriented to person, place, and time. Skin: Skin is warm and dry. She is not diaphoretic. Psychiatric: She has a normal mood and affect.    Nursing note and vitals reviewed. ASSESSMENT and PLAN    ICD-10-CM ICD-9-CM    1. Urinary frequency R35.0 788.41    2. Hoarseness R49.0 784.42    3. Type 2 diabetes mellitus without complication, without long-term current use of insulin (HCC) E11.9 250.00 HEMOGLOBIN A1C WITH EAG      CANCELED: AMB POC HEMOGLOBIN A1C       Urinary issue may be due to drinking a lot of water. She does not have incontinence. But sometimes just barely gets to the bathroom    Offered medication. Declined at present  [facility] urology referral. Also declines at this time. Hoarseness--possible allergic exposure.  Rec using her inhaler when this happened    F/u with Dr. Gabby Gomez as appointed    F/u 2 months for [facility]

## 2019-02-04 NOTE — PROGRESS NOTES
Beka Mcelroy is a 68 y.o. female (: 1945) presenting to address:    Chief Complaint   Patient presents with    Urinary Frequency       Vitals:    19 1546   BP: 150/73   Pulse: 79   Resp: 12   Temp: 97.6 °F (36.4 °C)   TempSrc: Oral   SpO2: 97%   Weight: 184 lb (83.5 kg)   Height: 5' 8\" (1.727 m)   PainSc:   5   PainLoc: Back       Hearing/Vision:   No exam data present    Learning Assessment:     Learning Assessment 2015   PRIMARY LEARNER Patient   HIGHEST LEVEL OF EDUCATION - PRIMARY LEARNER  -   BARRIERS PRIMARY LEARNER NONE   PRIMARY LANGUAGE ENGLISH   LEARNER PREFERENCE PRIMARY READING     -     -   ANSWERED BY pat   RELATIONSHIP SELF     Depression Screening:     PHQ over the last two weeks 2019   PHQ Not Done -   Little interest or pleasure in doing things Not at all   Feeling down, depressed, irritable, or hopeless Not at all   Total Score PHQ 2 0   Trouble falling or staying asleep, or sleeping too much -   Feeling tired or having little energy -   Poor appetite, weight loss, or overeating -   Feeling bad about yourself - or that you are a failure or have let yourself or your family down -   Trouble concentrating on things such as school, work, reading, or watching TV -   Moving or speaking so slowly that other people could have noticed; or the opposite being so fidgety that others notice -   Thoughts of being better off dead, or hurting yourself in some way -   PHQ 9 Score -     Fall Risk Assessment:     Fall Risk Assessment, last 12 mths 2019   Able to walk? Yes   Fall in past 12 months? No     Abuse Screening:     Abuse Screening Questionnaire 2018   Do you ever feel afraid of your partner? N   Are you in a relationship with someone who physically or mentally threatens you? N   Is it safe for you to go home? Y     Coordination of Care Questionaire:   1. Have you been to the ER, urgent care clinic since your last visit? Hospitalized since your last visit? NO    2. Have you seen or consulted any other health care providers outside of the [address] [place] [address] since your last visit? Include any pap smears or colon screening. NO    Advanced Directive:   1. Do you have an Advanced Directive? YES    2. Would you like information on Advanced Directives? YES    Pt declines flu vaccine.

## 2019-02-06 LAB
EST. AVERAGE GLUCOSE BLD GHB EST-MCNC: 143 MG/DL
HBA1C MFR BLD: 6.6 % (ref 4.8–5.6)

## 2019-03-22 DIAGNOSIS — M79.602 LEFT ARM PAIN: ICD-10-CM

## 2019-03-22 DIAGNOSIS — R51.9 HEADACHE DISORDER: ICD-10-CM

## 2019-03-22 DIAGNOSIS — J06.9 VIRAL UPPER RESPIRATORY TRACT INFECTION: ICD-10-CM

## 2019-03-22 DIAGNOSIS — M54.2 NECK PAIN: ICD-10-CM

## 2019-03-22 RX ORDER — ALBUTEROL SULFATE 90 UG/1
AEROSOL, METERED RESPIRATORY (INHALATION)
Qty: 1 INHALER | Refills: 5 | Status: SHIPPED | OUTPATIENT
Start: 2019-03-22 | End: 2022-03-14 | Stop reason: SDUPTHER

## 2019-03-22 RX ORDER — TIZANIDINE 4 MG/1
TABLET ORAL
Qty: 30 TAB | Refills: 1 | Status: SHIPPED | OUTPATIENT
Start: 2019-03-22 | End: 2019-12-09 | Stop reason: ALTCHOICE

## 2019-04-22 ENCOUNTER — OFFICE VISIT (OUTPATIENT)
Dept: INTERNAL MEDICINE CLINIC | Age: 74
End: 2019-04-22

## 2019-04-22 VITALS
DIASTOLIC BLOOD PRESSURE: 78 MMHG | WEIGHT: 187 LBS | TEMPERATURE: 98 F | HEIGHT: 68 IN | RESPIRATION RATE: 18 BRPM | HEART RATE: 71 BPM | SYSTOLIC BLOOD PRESSURE: 137 MMHG | BODY MASS INDEX: 28.34 KG/M2 | OXYGEN SATURATION: 96 %

## 2019-04-22 DIAGNOSIS — Z00.00 MEDICARE ANNUAL WELLNESS VISIT, SUBSEQUENT: Primary | ICD-10-CM

## 2019-04-22 DIAGNOSIS — E55.9 VITAMIN D DEFICIENCY: ICD-10-CM

## 2019-04-22 DIAGNOSIS — R05.9 COUGH: ICD-10-CM

## 2019-04-22 DIAGNOSIS — M25.50 ARTHRALGIA, UNSPECIFIED JOINT: ICD-10-CM

## 2019-04-22 NOTE — PROGRESS NOTES
ROOM # [id number] [facility] presents today for   Chief Complaint   Patient presents with    Head Pain       [place] preferred language for health care discussion is english/other.     Is someone accompanying this pt? no    Is the patient using any DME equipment during OV? no    Depression Screening:  3 most recent PHQ Screens 2/4/2019 1/5/2019 12/19/2018 10/3/2018 7/31/2018 6/18/2018 2/12/2018   PHQ Not Done - - - - - - -   Little interest or pleasure in doing things Not at all Not at all Not at all Not at all Not at all Not at all Not at all   Feeling down, depressed, irritable, or hopeless Not at all Not at all Not at all Not at all Not at all Not at all Not at all   Total Score PHQ 2 0 0 0 0 0 0 0   Trouble falling or staying asleep, or sleeping too much - - Not at all - - - -   Feeling tired or having little energy - - Not at all - - - -   Poor appetite, weight loss, or overeating - - Not at all - - - -   Feeling bad about yourself - or that you are a failure or have let yourself or your family down - - Not at all - - - -   Trouble concentrating on things such as school, work, reading, or watching TV - - Not at all - - - -   Moving or speaking so slowly that other people could have noticed; or the opposite being so fidgety that others notice - - Not at all - - - -   Thoughts of being better off dead, or hurting yourself in some way - - Not at all - - - -   PHQ 9 Score - - 0 - - - -       Learning Assessment:  Learning Assessment 1/22/2015 11/21/2014 4/4/2014   PRIMARY LEARNER Patient Patient Patient   HIGHEST LEVEL OF EDUCATION - PRIMARY LEARNER  - GRADUATED HIGH SCHOOL OR GED -   BARRIERS PRIMARY LEARNER NONE NONE -   PRIMARY LANGUAGE ENGLISH ENGLISH ENGLISH   LEARNER PREFERENCE PRIMARY READING LISTENING DEMONSTRATION     - READING -     - DEMONSTRATION -   ANSWERED BY rob Patient -   RELATIONSHIP SELF SELF -       Abuse Screening:  Abuse Screening Questionnaire 4/22/2019 7/31/2018 7/22/2015 Do you ever feel afraid of your partner? Y N N   Are you in a relationship with someone who physically or mentally threatens you? Y N N   Is it safe for you to go home? Roxana Vega       Fall Risk  Fall Risk Assessment, last 12 mths 4/22/2019 2/4/2019 1/5/2019 12/19/2018 10/3/2018 7/31/2018 6/18/2018   Able to walk? Yes Yes Yes Yes Yes Yes Yes   Fall in past 12 months? No No No No No No No       Health Maintenance reviewed and discussed per provider. Yes    Cristina Andrew is due for   Health Maintenance Due   Topic Date Due    Shingrix Vaccine Age 50> (1 of 2) 10/13/1995    Pneumococcal 65+ years (2 of 2 - PPSV23) 08/11/2018    MEDICARE YEARLY EXAM  08/12/2018    COLONOSCOPY  12/30/2018    FOOT EXAM Q1  01/29/2019     Please order/place referral if appropriate. Advance Directive:  1. Do you have an advance directive in place? Patient Reply: no    2. If not, would you like material regarding how to put one in place? Patient Reply: no    Coordination of Care:  1. Have you been to the ER, urgent care clinic since your last visit? Hospitalized since your last visit? no    2. Have you seen or consulted any other health care providers outside of the [address] [place] [address] since your last visit? Include any pap smears or colon screening.  GI
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
## 2019-04-22 NOTE — PATIENT INSTRUCTIONS
Medicare Wellness Visit, Female     The best way to live healthy is to have a lifestyle where you eat a well-balanced diet, exercise regularly, limit alcohol use, and quit all forms of tobacco/nicotine, if applicable. Regular preventive services are another way to keep healthy. Preventive services (vaccines, screening tests, monitoring & exams) can help personalize your care plan, which helps you manage your own care. Screening tests can find health problems at the earliest stages, when they are easiest to treat. [facility] follows the current, evidence-based guidelines published by the [facility] ([facility]) when recommending preventive services for our patients. Because we follow these guidelines, sometimes recommendations change over time as research supports it. (For example, mammograms used to be recommended annually. Even though Medicare will still pay for an annual mammogram, the newer guidelines recommend a mammogram every two years for women of average risk.)  Of course, you and your doctor may decide to screen more often for some diseases, based on your risk and your health status. Preventive services for you include:  - Medicare offers their members a free annual wellness visit, which is time for you and your primary care provider to discuss and plan for your preventive service needs. Take advantage of this benefit every year!  -All adults over the age of 72 should receive the recommended pneumonia vaccines. Current USPSTF guidelines recommend a series of two vaccines for the best pneumonia protection.   -All adults should have a flu vaccine yearly and a tetanus vaccine every 10 years. All adults age 61 and older should receive a shingles vaccine once in their lifetime.    -A bone mass density test is recommended when a woman turns 65 to screen for osteoporosis. This test is only recommended one time, as a screening.  Some providers will use this same test as a disease monitoring tool if you already have osteoporosis. -All adults age 38-68 who are overweight should have a diabetes screening test once every three years.   -Other screening tests and preventive services for persons with diabetes include: an eye exam to screen for diabetic retinopathy, a kidney function test, a foot exam, and stricter control over your cholesterol.   -Cardiovascular screening for adults with routine risk involves an electrocardiogram (ECG) at intervals determined by your doctor.   -Colorectal cancer screenings should be done for adults age 54-65 with no increased risk factors for colorectal cancer. There are a number of acceptable methods of screening for this type of cancer. Each test has its own benefits and drawbacks. Discuss with your doctor what is most appropriate for you during your annual wellness visit. The different tests include: colonoscopy (considered the best screening method), a fecal occult blood test, a fecal DNA test, and sigmoidoscopy. -Breast cancer screenings are recommended every other year for women of normal risk, age 54-69.  -Cervical cancer screenings for women over age 72 are only recommended with certain risk factors.   -All adults born between [facility] should be screened once for Hepatitis C.      Here is a list of your current Health Maintenance items (your personalized list of preventive services) with a due date:  Health Maintenance Due   Topic Date Due    Shingles Vaccine (1 of 2) 10/13/1995    Pneumococcal Vaccine (2 of 2 - PPSV23) 08/11/2018    Annual Well Visit  08/12/2018    Colonoscopy  12/30/2018    Diabetic Foot Care  01/29/2019

## 2019-04-24 LAB
25(OH)D3+25(OH)D2 SERPL-MCNC: 22.1 NG/ML (ref 30–100)
ALBUMIN SERPL-MCNC: 4.3 G/DL (ref 3.5–4.8)
ALBUMIN/GLOB SERPL: 1.6 {RATIO} (ref 1.2–2.2)
ALP SERPL-CCNC: 65 IU/L (ref 39–117)
ALT SERPL-CCNC: 16 IU/L (ref 0–32)
ANA TITR SER IF: NEGATIVE {TITER}
AST SERPL-CCNC: 25 IU/L (ref 0–40)
BILIRUB SERPL-MCNC: 0.2 MG/DL (ref 0–1.2)
BUN SERPL-MCNC: 12 MG/DL (ref 8–27)
BUN/CREAT SERPL: 15 (ref 12–28)
CALCIUM SERPL-MCNC: 9.4 MG/DL (ref 8.7–10.3)
CCP IGA+IGG SERPL IA-ACNC: 56 UNITS (ref 0–19)
CHLORIDE SERPL-SCNC: 105 MMOL/L (ref 96–106)
CO2 SERPL-SCNC: 25 MMOL/L (ref 20–29)
CREAT SERPL-MCNC: 0.79 MG/DL (ref 0.57–1)
CRP SERPL-MCNC: 0.6 MG/L (ref 0–4.9)
GLOBULIN SER CALC-MCNC: 2.7 G/DL (ref 1.5–4.5)
GLUCOSE SERPL-MCNC: 103 MG/DL (ref 65–99)
POTASSIUM SERPL-SCNC: 3.9 MMOL/L (ref 3.5–5.2)
PROT SERPL-MCNC: 7 G/DL (ref 6–8.5)
RHEUMATOID FACT SERPL-ACNC: <10 IU/ML (ref 0–13.9)
SODIUM SERPL-SCNC: 141 MMOL/L (ref 134–144)

## 2019-06-18 ENCOUNTER — OFFICE VISIT (OUTPATIENT)
Dept: INTERNAL MEDICINE CLINIC | Age: 74
End: 2019-06-18

## 2019-06-18 VITALS
OXYGEN SATURATION: 96 % | SYSTOLIC BLOOD PRESSURE: 145 MMHG | DIASTOLIC BLOOD PRESSURE: 77 MMHG | HEART RATE: 72 BPM | TEMPERATURE: 97.9 F | RESPIRATION RATE: 20 BRPM | HEIGHT: 68 IN | WEIGHT: 185 LBS | BODY MASS INDEX: 28.04 KG/M2

## 2019-06-18 DIAGNOSIS — E78.5 DYSLIPIDEMIA: Chronic | ICD-10-CM

## 2019-06-18 DIAGNOSIS — Z76.0 MEDICATION REFILL: ICD-10-CM

## 2019-06-18 DIAGNOSIS — R39.15 URINARY URGENCY: ICD-10-CM

## 2019-06-18 DIAGNOSIS — E11.9 TYPE 2 DIABETES MELLITUS WITHOUT COMPLICATION, WITHOUT LONG-TERM CURRENT USE OF INSULIN (HCC): Primary | Chronic | ICD-10-CM

## 2019-06-18 RX ORDER — ESTRADIOL 0.1 MG/D
1 PATCH TRANSDERMAL
Qty: 12 PATCH | Refills: 5 | Status: SHIPPED | OUTPATIENT
Start: 2019-06-18 | End: 2020-06-29 | Stop reason: SDUPTHER

## 2019-06-18 RX ORDER — TRAMADOL HYDROCHLORIDE 50 MG/1
50 TABLET ORAL
Qty: 60 TAB | Refills: 5 | Status: SHIPPED | OUTPATIENT
Start: 2019-06-18 | End: 2019-07-18

## 2019-06-18 NOTE — PROGRESS NOTES
HISTORY OF PRESENT ILLNESS  Steven Cespedes is a 68 y.o. female. 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    Current Outpatient Medications:  albuterol (PROVENTIL HFA, VENTOLIN HFA, PROAIR HFA) 90 mcg/actuation inhaler, INHALE 2 PUFFS BY MOUTH EVERY 6 HOURS AS NEEDED FOR WHEEZING FOR SHORTNESS OF BREATH  estradiol (CLIMARA) 0.1 mg/24 hr, APPLY ONE PATCH TOPICALLY ONCE A WEEK ON  MONDAY  ergocalciferol (VITAMIN D2) 50,000 unit capsule, TAKE ONE CAPSULE BY MOUTH ONCE A WEEK  inhalational spacing device, Use spacer every time you use your inhaler  simvastatin (ZOCOR) 20 mg tablet, TAKE ONE TABLET BY MOUTH AT BEDTIME  Indications: hyperlipidemia  metFORMIN ER (GLUCOPHAGE XR) 500 mg tablet, TAKE ONE TABLET BY MOUTH ONCE DAILY WITH SUPPER  fluticasone (FLONASE) 50 mcg/actuation nasal spray, 2 Sprays by Both Nostrils route daily. Indications: ALLERGIC RHINITIS  rOPINIRole (REQUIP) 0.5 mg tablet, Take 1 Tab by mouth two (2) times daily as needed. Indications: Restless Legs Syndrome  aspirin (ASPIRIN) 325 mg tablet, Take 2 Tabs by mouth two (2) times a day. (Patient taking differently: Take 325 mg by mouth daily.)  multivitamin (ONE A DAY) tablet, Take 1 Tab by mouth daily.   Blood-Glucose Meter (ONETOUCH ULTRA2) monitoring kit, Use to test blood sugar daily  glucose blood VI test strips (ONETOUCH ULTRA TEST) strip, Use to test blood sugar daily  Lancing Device with Lancets (ONE TOUCH DELICA) kit, Use to test blood sugar daily  tiZANidine (ZANAFLEX) 4 mg tablet, TAKE 1 TABLET BY MOUTH ONCE NIGHTLY AS NEEDED  traMADol (ULTRAM) 50 mg tablet, TAKE ONE TABLET BY MOUTH EVERY EIGHT HOURS AS NEEDED FOR PAIN  Indications: Pain            Diabetes   The history is provided by the patient. This is a chronic problem. The current episode started more than 1 week ago. The problem occurs daily. Pertinent negatives include no chest pain and no shortness of breath. Exacerbated by: diet. The symptoms are relieved by medications (diet). Review of Systems   Constitutional: Negative for chills and fever. Respiratory: Negative for shortness of breath. Cardiovascular: Negative for chest pain and palpitations. Genitourinary: Positive for frequency and urgency. Can't always get to the bathroom in time       Physical Exam   Constitutional: She is oriented to person, place, and time. She appears well-developed and well-nourished. No distress. Cardiovascular: Normal rate and regular rhythm. Pulmonary/Chest: Effort normal and breath sounds normal.   Musculoskeletal: She exhibits no edema. Neurological: She is alert and oriented to person, place, and time. Diabetic foot exam:     Left Foot:   Visual Exam: bone spur on dorsum proximal to large toe   Pulse DP: 2+ (normal)   Filament test: normal sensation    Vibratory sensation: diminished      Right Foot:   Visual Exam: bone spur proximal to large toe   Pulse DP: 2+ (normal)   Filament test: normal sensation    Vibratory sensation: diminished     Skin: Skin is warm and dry. She is not diaphoretic. Psychiatric: She has a normal mood and affect. Nursing note and vitals reviewed. ASSESSMENT and PLAN    ICD-10-CM ICD-9-CM    1. Type 2 diabetes mellitus without complication, without long-term current use of insulin ([facility]) S01.3 483.65 METABOLIC PANEL, COMPREHENSIVE      HEMOGLOBIN A1C W/O EAG      LIPID PANEL       DIABETES FOOT EXAM   2. Dyslipidemia E78.5 272.4 LIPID PANEL   3. Urinary urgency R39.15 788.63 URINALYSIS W/ RFLX MICROSCOPIC      CULTURE, URINE   4.  Medication refill Z76.0 V68.1 traMADol (ULTRAM) 50 mg tablet      estradiol (CLIMARA) 0.1 mg/24 hr       BP borderline today    Update lab today including urine    advised to f/u with urology for bladder sxs.  But will check urine and cx for current sxs    F/u 4 months

## 2019-06-20 LAB
ALBUMIN SERPL-MCNC: 4.7 G/DL (ref 3.5–4.8)
ALBUMIN/GLOB SERPL: 1.6 {RATIO} (ref 1.2–2.2)
ALP SERPL-CCNC: 69 IU/L (ref 39–117)
ALT SERPL-CCNC: 17 IU/L (ref 0–32)
APPEARANCE UR: CLEAR
AST SERPL-CCNC: 17 IU/L (ref 0–40)
BACTERIA UR CULT: NORMAL
BILIRUB SERPL-MCNC: 0.3 MG/DL (ref 0–1.2)
BILIRUB UR QL STRIP: NEGATIVE
BUN SERPL-MCNC: 14 MG/DL (ref 8–27)
BUN/CREAT SERPL: 18 (ref 12–28)
CALCIUM SERPL-MCNC: 9.9 MG/DL (ref 8.7–10.3)
CHLORIDE SERPL-SCNC: 102 MMOL/L (ref 96–106)
CHOLEST SERPL-MCNC: 265 MG/DL (ref 100–199)
CO2 SERPL-SCNC: 20 MMOL/L (ref 20–29)
COLOR UR: YELLOW
CREAT SERPL-MCNC: 0.8 MG/DL (ref 0.57–1)
GLOBULIN SER CALC-MCNC: 3 G/DL (ref 1.5–4.5)
GLUCOSE SERPL-MCNC: 174 MG/DL (ref 65–99)
GLUCOSE UR QL: ABNORMAL
HBA1C MFR BLD: 6.3 % (ref 4.8–5.6)
HDLC SERPL-MCNC: 64 MG/DL
HGB UR QL STRIP: NEGATIVE
INTERPRETATION, 910389: NORMAL
KETONES UR QL STRIP: NEGATIVE
LDLC SERPL CALC-MCNC: 175 MG/DL (ref 0–99)
LEUKOCYTE ESTERASE UR QL STRIP: NEGATIVE
MICRO URNS: ABNORMAL
NITRITE UR QL STRIP: NEGATIVE
PH UR STRIP: 5 [PH] (ref 5–7.5)
POTASSIUM SERPL-SCNC: 4 MMOL/L (ref 3.5–5.2)
PROT SERPL-MCNC: 7.7 G/DL (ref 6–8.5)
PROT UR QL STRIP: NEGATIVE
SODIUM SERPL-SCNC: 139 MMOL/L (ref 134–144)
SP GR UR: 1.01 (ref 1–1.03)
TRIGL SERPL-MCNC: 131 MG/DL (ref 0–149)
UROBILINOGEN UR STRIP-MCNC: 0.2 MG/DL (ref 0.2–1)
VLDLC SERPL CALC-MCNC: 26 MG/DL (ref 5–40)

## 2019-07-26 DIAGNOSIS — Z76.0 MEDICATION REFILL: ICD-10-CM

## 2019-07-26 RX ORDER — METFORMIN HYDROCHLORIDE 500 MG/1
TABLET, EXTENDED RELEASE ORAL
Qty: 90 TAB | Refills: 5 | Status: SHIPPED | OUTPATIENT
Start: 2019-07-26 | End: 2020-10-28 | Stop reason: SDUPTHER

## 2019-08-05 ENCOUNTER — OFFICE VISIT (OUTPATIENT)
Dept: INTERNAL MEDICINE CLINIC | Age: 74
End: 2019-08-05

## 2019-08-05 VITALS
HEIGHT: 68 IN | RESPIRATION RATE: 18 BRPM | BODY MASS INDEX: 27.13 KG/M2 | DIASTOLIC BLOOD PRESSURE: 65 MMHG | WEIGHT: 179 LBS | HEART RATE: 74 BPM | OXYGEN SATURATION: 95 % | TEMPERATURE: 98.8 F | SYSTOLIC BLOOD PRESSURE: 132 MMHG

## 2019-08-05 DIAGNOSIS — E11.9 TYPE 2 DIABETES MELLITUS WITHOUT COMPLICATION, WITHOUT LONG-TERM CURRENT USE OF INSULIN (HCC): ICD-10-CM

## 2019-08-05 DIAGNOSIS — M25.571 ACUTE RIGHT ANKLE PAIN: ICD-10-CM

## 2019-08-05 DIAGNOSIS — L98.9 SKIN LESION: Primary | ICD-10-CM

## 2019-08-05 NOTE — PROGRESS NOTES
ROOM # 2392 [employer] presents today for   Chief Complaint   Patient presents with    Diabetes    Medication Refill       Jeanette David preferred language for health care discussion is english/other.     Is someone accompanying this pt? no    Is the patient using any DME equipment during [address]? no    Depression Screening:  3 most recent PHQ Screens 2/4/2019 1/5/2019 12/19/2018 10/3/2018 7/31/2018 6/18/2018 2/12/2018   PHQ Not Done - - - - - - -   Little interest or pleasure in doing things Not at all Not at all Not at all Not at all Not at all Not at all Not at all   Feeling down, depressed, irritable, or hopeless Not at all Not at all Not at all Not at all Not at all Not at all Not at all   Total Score PHQ 2 0 0 0 0 0 0 0   Trouble falling or staying asleep, or sleeping too much - - Not at all - - - -   Feeling tired or having little energy - - Not at all - - - -   Poor appetite, weight loss, or overeating - - Not at all - - - -   Feeling bad about yourself - or that you are a failure or have let yourself or your family down - - Not at all - - - -   Trouble concentrating on things such as school, work, reading, or watching TV - - Not at all - - - -   Moving or speaking so slowly that other people could have noticed; or the opposite being so fidgety that others notice - - Not at all - - - -   Thoughts of being better off dead, or hurting yourself in some way - - Not at all - - - -   PHQ 9 Score - - 0 - - - -       Learning Assessment:  Learning Assessment 1/22/2015 11/21/2014 4/4/2014   PRIMARY LEARNER Patient Patient Patient   HIGHEST LEVEL OF EDUCATION - PRIMARY LEARNER  - GRADUATED HIGH SCHOOL OR GED -   BARRIERS PRIMARY LEARNER NONE NONE -   PRIMARY LANGUAGE ENGLISH ENGLISH ENGLISH   LEARNER PREFERENCE PRIMARY READING LISTENING DEMONSTRATION     - READING -     - DEMONSTRATION -   ANSWERED BY pat Patient -   RELATIONSHIP SELF SELF -       Abuse Screening:  Abuse Screening Questionnaire 4/22/2019 7/31/2018 7/22/2015   Do you ever feel afraid of your partner? Y N N   Are you in a relationship with someone who physically or mentally threatens you? Y N N   Is it safe for you to go home? Vickei Ibrahim       Fall Risk  Fall Risk Assessment, last 12 mths 4/22/2019 2/4/2019 1/5/2019 12/19/2018 10/3/2018 7/31/2018 6/18/2018   Able to walk? Yes Yes Yes Yes Yes Yes Yes   Fall in past 12 months? No No No No No No No           Health Maintenance reviewed and discussed per provider. Yes    Venida [place] is due for   Health Maintenance Due   Topic Date Due    Shingrix Vaccine Age 50> (1 of 2) 10/13/1995    MICROALBUMIN Q1  07/31/2019    Influenza Age 9 to Adult  08/01/2019     Please order/place referral if appropriate. Advance Directive:  1. Do you have an advance directive in place? Patient Reply: no    2. If not, would you like material regarding how to put one in place? Patient Reply: no    Coordination of Care:  1. Have you been to the ER, urgent care clinic since your last visit? Hospitalized since your last visit? no    2. Have you seen or consulted any other health care providers outside of the [address] [place] [address] since your last visit? Include any pap smears or colon screening.  Neck doctor

## 2019-08-05 NOTE — PROGRESS NOTES
HISTORY OF PRESENT ILLNESS  Polina Bautista is a 68 y.o. female. Diabetes   The history is provided by the patient. This is a chronic problem. The current episode started more than 1 week ago. The problem occurs daily. The problem has not changed since onset. Exacerbated by: diet. The symptoms are relieved by medications (diet). Skin Problem   The history is provided by the patient (dark papule on left forehead). This is a chronic problem. The current episode started more than 1 week ago. The problem occurs hourly. Review of Systems   Constitutional: Positive for chills. Negative for fever. Respiratory: Negative. Cardiovascular: Negative. Musculoskeletal:        Prominent medial malleolus on right ankle   Skin:        Lesion on forehead       Physical Exam   Constitutional: She is oriented to person, place, and time. She appears well-developed and well-nourished. No distress. HENT:   Head:       Cardiovascular: Normal rate. Pulmonary/Chest: Effort normal.   Musculoskeletal: She exhibits no edema. Neurological: She is alert and oriented to person, place, and time. Skin: Skin is warm and dry. She is not diaphoretic. Psychiatric: She has a normal mood and affect. Nursing note and vitals reviewed. ASSESSMENT and PLAN    ICD-10-CM ICD-9-CM    1. Skin lesion L98.9 709.9 REFERRAL TO DERMATOLOGY   2. Acute right ankle pain M25.571 719.47 REFERRAL TO ORTHOPEDICS     338.19    3.  Type 2 diabetes mellitus without complication, without long-term current use of insulin ([facility]) E11.9 250.00        Too son for blood sugar check    Refer to dermatology    Refer to ortho about the right ankle pain    F/u 2 months for DM, BP check

## 2019-09-11 ENCOUNTER — OFFICE VISIT (OUTPATIENT)
Dept: INTERNAL MEDICINE CLINIC | Age: 74
End: 2019-09-11

## 2019-09-11 VITALS
SYSTOLIC BLOOD PRESSURE: 137 MMHG | DIASTOLIC BLOOD PRESSURE: 72 MMHG | HEIGHT: 68 IN | TEMPERATURE: 96.7 F | OXYGEN SATURATION: 96 % | WEIGHT: 181 LBS | BODY MASS INDEX: 27.43 KG/M2 | HEART RATE: 79 BPM | RESPIRATION RATE: 20 BRPM

## 2019-09-11 DIAGNOSIS — N64.4 BREAST TENDERNESS: ICD-10-CM

## 2019-09-11 DIAGNOSIS — N64.4 BREAST PAIN, RIGHT: Primary | ICD-10-CM

## 2019-09-11 DIAGNOSIS — K92.1 BLOOD IN STOOL: ICD-10-CM

## 2019-09-11 DIAGNOSIS — R20.2 TINGLING OF BOTH FEET: ICD-10-CM

## 2019-09-11 NOTE — PROGRESS NOTES
HISTORY OF PRESENT ILLNESS  Meche Keita is a 68 y.o. female. Visit Vitals  /72   Pulse 79   Temp 96.7 °F (35.9 °C) (Oral)   Resp 20   Ht 5' 8\" (1.727 m)   Wt 181 lb (82.1 kg)   SpO2 96%   BMI 27.52 kg/m²       Here with complaint new right breast pain    Thought she saw some blood in her stool. She has irregular bowel. Sometimes constipation and sometimes loose  She just had a colonoscopy in March (and an EGD) which found nothing    Breast pain   The history is provided by the patient (right breat nipple has been sore. And sometimes she has mild tenderness). This is a new problem. Pertinent negatives include no chest pain. Review of Systems   Constitutional: Negative for chills and fever. Cardiovascular: Negative for chest pain and palpitations. Gastrointestinal: Positive for blood in stool. Neurological: Positive for tingling (off and on). Physical Exam   Constitutional: She is oriented to person, place, and time. She appears well-developed and well-nourished. No distress. Cardiovascular: Normal rate. Pulmonary/Chest: Effort normal.   Right breast exam unremarkable. Inverted nipple as per hx. Dense tissue   Genitourinary:   Genitourinary Comments: Rectal deferred   Neurological: She is alert and oriented to person, place, and time. Skin: She is not diaphoretic. Nursing note and vitals reviewed. ASSESSMENT and PLAN    ICD-10-CM ICD-9-CM    1. Breast pain, right N64.4 611.71 JOMAR MAMMO RT DX INCL CAD      US BREAST RT LIMITED=<3 QUAD   2. Breast tenderness N64.4 611.71 JOMAR MAMMO RT DX INCL CAD      US BREAST RT LIMITED=<3 QUAD   3. Blood in stool K92.1 578.1    4. Tingling of both feet R20.2 782.0        Even though recent mammogram, sxs are new without obvious cause. Will do diagnostic mammogram and ultrasound    Blood seen in stool. With recent colonoscopy. Irregular BM/constipation so likely a hemorrhoid.  Nothing needs to be done unless this continues    Occasional tingling in her feet in spite of gabapentin. Blood sugars vary a lot per pt but last HBA1c was good. Probably neuropathy sxs. F/u  6 weeks.  For diabetic check and f/u breast pain

## 2019-09-11 NOTE — PROGRESS NOTES
ROOM # 322 [facility] presents today for   Chief Complaint   Patient presents with    Breast pain     right     Tingling     feet    Rash     face        Meredith Right preferred language for health care discussion is english/other.     Is someone accompanying this pt? no    Is the patient using any DME equipment during [address]? no    Depression Screening:  3 most recent PHQ Screens 2/4/2019 1/5/2019 12/19/2018 10/3/2018 7/31/2018 6/18/2018 2/12/2018   PHQ Not Done - - - - - - -   Little interest or pleasure in doing things Not at all Not at all Not at all Not at all Not at all Not at all Not at all   Feeling down, depressed, irritable, or hopeless Not at all Not at all Not at all Not at all Not at all Not at all Not at all   Total Score PHQ 2 0 0 0 0 0 0 0   Trouble falling or staying asleep, or sleeping too much - - Not at all - - - -   Feeling tired or having little energy - - Not at all - - - -   Poor appetite, weight loss, or overeating - - Not at all - - - -   Feeling bad about yourself - or that you are a failure or have let yourself or your family down - - Not at all - - - -   Trouble concentrating on things such as school, work, reading, or watching TV - - Not at all - - - -   Moving or speaking so slowly that other people could have noticed; or the opposite being so fidgety that others notice - - Not at all - - - -   Thoughts of being better off dead, or hurting yourself in some way - - Not at all - - - -   PHQ 9 Score - - 0 - - - -       Learning Assessment:  Learning Assessment 1/22/2015 11/21/2014 4/4/2014   PRIMARY LEARNER Patient Patient Patient   HIGHEST LEVEL OF EDUCATION - PRIMARY LEARNER  - GRADUATED HIGH SCHOOL OR GED -   BARRIERS PRIMARY LEARNER NONE NONE -   PRIMARY LANGUAGE ENGLISH ENGLISH ENGLISH   LEARNER PREFERENCE PRIMARY READING LISTENING DEMONSTRATION     - READING -     - DEMONSTRATION -   ANSWERED BY rob Patient -   RELATIONSHIP SELF SELF -       Abuse Screening:  Abuse Screening Questionnaire 4/22/2019 7/31/2018 7/22/2015   Do you ever feel afraid of your partner? Y N N   Are you in a relationship with someone who physically or mentally threatens you? Y N N   Is it safe for you to go home? Vickie Ibrahim       Fall Risk  Fall Risk Assessment, last 12 mths 4/22/2019 2/4/2019 1/5/2019 12/19/2018 10/3/2018 7/31/2018 6/18/2018   Able to walk? Yes Yes Yes Yes Yes Yes Yes   Fall in past 12 months? No No No No No No No           Health Maintenance reviewed and discussed per provider. Yes    Venida [place] is due for   Health Maintenance Due   Topic Date Due    Shingrix Vaccine Age 50> (1 of 2) 10/13/1995    MICROALBUMIN Q1  07/31/2019    Influenza Age 9 to Adult  08/01/2019     Please order/place referral if appropriate. Advance Directive:  1. Do you have an advance directive in place? Patient Reply: no    2. If not, would you like material regarding how to put one in place? Patient Reply: no    Coordination of Care:  1. Have you been to the ER, urgent care clinic since your last visit? Hospitalized since your last visit? no    2. Have you seen or consulted any other health care providers outside of the [address] [place] [address] since your last visit? Include any pap smears or colon screening.  no

## 2019-09-24 DIAGNOSIS — N64.4 BREAST TENDERNESS: ICD-10-CM

## 2019-09-24 DIAGNOSIS — N64.4 BREAST PAIN, RIGHT: ICD-10-CM

## 2019-10-02 ENCOUNTER — OFFICE VISIT (OUTPATIENT)
Dept: INTERNAL MEDICINE CLINIC | Age: 74
End: 2019-10-02

## 2019-10-02 VITALS
HEART RATE: 72 BPM | OXYGEN SATURATION: 97 % | HEIGHT: 68 IN | TEMPERATURE: 98 F | DIASTOLIC BLOOD PRESSURE: 78 MMHG | WEIGHT: 177 LBS | RESPIRATION RATE: 22 BRPM | BODY MASS INDEX: 26.83 KG/M2 | SYSTOLIC BLOOD PRESSURE: 121 MMHG

## 2019-10-02 DIAGNOSIS — M79.602 PARESTHESIA AND PAIN OF BOTH UPPER EXTREMITIES: Primary | ICD-10-CM

## 2019-10-02 DIAGNOSIS — M50.90 CERVICAL DISC DISORDER: ICD-10-CM

## 2019-10-02 DIAGNOSIS — R10.9 FLANK PAIN, CHRONIC: ICD-10-CM

## 2019-10-02 DIAGNOSIS — G89.29 FLANK PAIN, CHRONIC: ICD-10-CM

## 2019-10-02 DIAGNOSIS — M79.601 PARESTHESIA AND PAIN OF BOTH UPPER EXTREMITIES: Primary | ICD-10-CM

## 2019-10-02 DIAGNOSIS — R20.2 PARESTHESIA AND PAIN OF BOTH UPPER EXTREMITIES: Primary | ICD-10-CM

## 2019-10-02 DIAGNOSIS — E11.9 TYPE 2 DIABETES MELLITUS WITHOUT COMPLICATION, WITHOUT LONG-TERM CURRENT USE OF INSULIN (HCC): Chronic | ICD-10-CM

## 2019-10-02 NOTE — PROGRESS NOTES
ROOM # 102 [facility] presents today for   Chief Complaint   Patient presents with    Diabetes    Breast pain       Kaylin Milligan preferred language for health care discussion is english/other.     Is someone accompanying this pt? no    Is the patient using any DME equipment during [address]? no    Depression Screening:  3 most recent PHQ Screens 2/4/2019 1/5/2019 12/19/2018 10/3/2018 7/31/2018 6/18/2018 2/12/2018   PHQ Not Done - - - - - - -   Little interest or pleasure in doing things Not at all Not at all Not at all Not at all Not at all Not at all Not at all   Feeling down, depressed, irritable, or hopeless Not at all Not at all Not at all Not at all Not at all Not at all Not at all   Total Score PHQ 2 0 0 0 0 0 0 0   Trouble falling or staying asleep, or sleeping too much - - Not at all - - - -   Feeling tired or having little energy - - Not at all - - - -   Poor appetite, weight loss, or overeating - - Not at all - - - -   Feeling bad about yourself - or that you are a failure or have let yourself or your family down - - Not at all - - - -   Trouble concentrating on things such as school, work, reading, or watching TV - - Not at all - - - -   Moving or speaking so slowly that other people could have noticed; or the opposite being so fidgety that others notice - - Not at all - - - -   Thoughts of being better off dead, or hurting yourself in some way - - Not at all - - - -   PHQ 9 Score - - 0 - - - -       Learning Assessment:  Learning Assessment 1/22/2015 11/21/2014 4/4/2014   PRIMARY LEARNER Patient Patient Patient   HIGHEST LEVEL OF EDUCATION - PRIMARY LEARNER  - GRADUATED HIGH SCHOOL OR GED -   BARRIERS PRIMARY LEARNER NONE NONE -   PRIMARY LANGUAGE ENGLISH ENGLISH ENGLISH   LEARNER PREFERENCE PRIMARY READING LISTENING DEMONSTRATION     - READING -     - DEMONSTRATION -   ANSWERED BY rob Patient -   RELATIONSHIP SELF SELF -       Abuse Screening:  Abuse Screening Questionnaire 4/22/2019 7/31/2018 7/22/2015   Do you ever feel afraid of your partner? Y N N   Are you in a relationship with someone who physically or mentally threatens you? Y N N   Is it safe for you to go home? [facility]       Fall Risk  Fall Risk Assessment, last 12 mths 4/22/2019 2/4/2019 1/5/2019 12/19/2018 10/3/2018 7/31/2018 6/18/2018   Able to walk? Yes Yes Yes Yes Yes Yes Yes   Fall in past 12 months? No No No No No No No           Health Maintenance reviewed and discussed per provider. Yes    Radha Bean is due for   Health Maintenance Due   Topic Date Due    MICROALBUMIN Q1  07/31/2019    Influenza Age 5 to Adult  08/01/2019     Please order/place referral if appropriate. Advance Directive:  1. Do you have an advance directive in place? Patient Reply: no    2. If not, would you like material regarding how to put one in place? Patient Reply: no    Coordination of Care:  1. Have you been to the ER, urgent care clinic since your last visit? Hospitalized since your last visit? no    2. Have you seen or consulted any other health care providers outside of the [address] [place] [address] since your last visit? Include any pap smears or colon screening.  no

## 2019-10-02 NOTE — PROGRESS NOTES
HISTORY OF PRESENT ILLNESS  Janna Laboy is a 68 y.o. female. Visit Vitals  /78 (BP 1 Location: Left arm, BP Patient Position: Sitting)   Pulse 72   Temp 98 °F (36.7 °C) (Oral)   Resp 22   Ht 5' 8\" (1.727 m)   Wt 177 lb (80.3 kg)   SpO2 97%   BMI 26.91 kg/m²       States breast issue is resolved. Still occasional pain but not like it was    Today, we have right flank pain. Recalls no injury. Several months. Persistent. Every day. Turning over at night seems to make it worse. Nothing seems to help. Has not really tried any medication. No effect on BMs    Reports both hands and arms go numb at night. More on the right hand side  Can shake them out some. Right arm tingles at times. Strength is not impaired. Review of Systems   Constitutional: Negative. Neurological: Negative for dizziness and headaches. Physical Exam   Constitutional: She is oriented to person, place, and time. She appears well-developed and well-nourished. No distress. Cardiovascular: Normal rate. Pulmonary/Chest: Effort normal.   Neurological: She is alert and oriented to person, place, and time. Neg Tinel's at either wrist.  Strength intact   Skin: Skin is warm and dry. She is not diaphoretic. Psychiatric: She has a normal mood and affect. Nursing note and vitals reviewed. ASSESSMENT and PLAN    ICD-10-CM ICD-9-CM    1. Paresthesia and pain of both upper extremities R20.2 782.0 REFERRAL TO NEUROLOGY    M79.601 729.5 XR SPINE THORAC 3 V    M79.602     2. Cervical disc disorder M50.90 722.91 REFERRAL TO NEUROLOGY   3. Flank pain, chronic R10.9 789.09 XR SPINE THORAC 3 V    G89.29 338.29 XR RIBS RT UNI 2 V   4. 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     Will refer to neurology for an evaluation    Xray spine and right ribs for flank pain    She will get her lab done (on order since June)    F/u one month for recheck

## 2019-10-04 LAB
ALBUMIN SERPL-MCNC: 4.4 G/DL (ref 3.5–4.8)
ALBUMIN/CREAT UR: 3.5 MG/G CREAT (ref 0–30)
ALBUMIN/GLOB SERPL: 1.8 {RATIO} (ref 1.2–2.2)
ALP SERPL-CCNC: 56 IU/L (ref 39–117)
ALT SERPL-CCNC: 18 IU/L (ref 0–32)
AST SERPL-CCNC: 19 IU/L (ref 0–40)
BILIRUB SERPL-MCNC: 0.4 MG/DL (ref 0–1.2)
BUN SERPL-MCNC: 12 MG/DL (ref 8–27)
BUN/CREAT SERPL: 15 (ref 12–28)
CALCIUM SERPL-MCNC: 9.8 MG/DL (ref 8.7–10.3)
CHLORIDE SERPL-SCNC: 103 MMOL/L (ref 96–106)
CHOLEST SERPL-MCNC: 258 MG/DL (ref 100–199)
CO2 SERPL-SCNC: 23 MMOL/L (ref 20–29)
CREAT SERPL-MCNC: 0.79 MG/DL (ref 0.57–1)
CREAT UR-MCNC: 175.8 MG/DL
GLOBULIN SER CALC-MCNC: 2.4 G/DL (ref 1.5–4.5)
GLUCOSE SERPL-MCNC: 93 MG/DL (ref 65–99)
HBA1C MFR BLD: 6.1 % (ref 4.8–5.6)
HDLC SERPL-MCNC: 45 MG/DL
INTERPRETATION, 910389: NORMAL
LDLC SERPL CALC-MCNC: 165 MG/DL (ref 0–99)
MICROALBUMIN UR-MCNC: 6.1 UG/ML
POTASSIUM SERPL-SCNC: 4 MMOL/L (ref 3.5–5.2)
PROT SERPL-MCNC: 6.8 G/DL (ref 6–8.5)
SODIUM SERPL-SCNC: 140 MMOL/L (ref 134–144)
TRIGL SERPL-MCNC: 238 MG/DL (ref 0–149)
VLDLC SERPL CALC-MCNC: 48 MG/DL (ref 5–40)

## 2019-11-11 ENCOUNTER — OFFICE VISIT (OUTPATIENT)
Dept: INTERNAL MEDICINE CLINIC | Age: 74
End: 2019-11-11

## 2019-11-11 VITALS
HEIGHT: 68 IN | TEMPERATURE: 98.8 F | RESPIRATION RATE: 20 BRPM | SYSTOLIC BLOOD PRESSURE: 130 MMHG | HEART RATE: 69 BPM | OXYGEN SATURATION: 96 % | BODY MASS INDEX: 27.43 KG/M2 | DIASTOLIC BLOOD PRESSURE: 79 MMHG | WEIGHT: 181 LBS

## 2019-11-11 DIAGNOSIS — M50.90 CERVICAL DISC DISORDER: ICD-10-CM

## 2019-11-11 DIAGNOSIS — G44.89 OTHER HEADACHE SYNDROME: ICD-10-CM

## 2019-11-11 DIAGNOSIS — M79.601 PARESTHESIA AND PAIN OF BOTH UPPER EXTREMITIES: Primary | ICD-10-CM

## 2019-11-11 DIAGNOSIS — M79.602 PARESTHESIA AND PAIN OF BOTH UPPER EXTREMITIES: Primary | ICD-10-CM

## 2019-11-11 DIAGNOSIS — R20.2 PARESTHESIA AND PAIN OF BOTH UPPER EXTREMITIES: Primary | ICD-10-CM

## 2019-11-11 DIAGNOSIS — R41.9 COGNITIVE COMPLAINTS: ICD-10-CM

## 2019-11-11 NOTE — PROGRESS NOTES
ROOM # [address] presents today for   Chief Complaint   Patient presents with   [address] preferred language for health care discussion is english/other.     Is someone accompanying this pt? no    Is the patient using any DME equipment during [facility]? no    Depression Screening:  3 most recent PHQ Screens 2/4/2019 1/5/2019 12/19/2018 10/3/2018 7/31/2018 6/18/2018 2/12/2018   PHQ Not Done - - - - - - -   Little interest or pleasure in doing things Not at all Not at all Not at all Not at all Not at all Not at all Not at all   Feeling down, depressed, irritable, or hopeless Not at all Not at all Not at all Not at all Not at all Not at all Not at all   Total Score PHQ 2 0 0 0 0 0 0 0   Trouble falling or staying asleep, or sleeping too much - - Not at all - - - -   Feeling tired or having little energy - - Not at all - - - -   Poor appetite, weight loss, or overeating - - Not at all - - - -   Feeling bad about yourself - or that you are a failure or have let yourself or your family down - - Not at all - - - -   Trouble concentrating on things such as school, work, reading, or watching TV - - Not at all - - - -   Moving or speaking so slowly that other people could have noticed; or the opposite being so fidgety that others notice - - Not at all - - - -   Thoughts of being better off dead, or hurting yourself in some way - - Not at all - - - -   PHQ 9 Score - - 0 - - - -       Learning Assessment:  Learning Assessment 1/22/2015 11/21/2014 4/4/2014   PRIMARY LEARNER Patient Patient Patient   HIGHEST LEVEL OF EDUCATION - PRIMARY LEARNER  - GRADUATED HIGH SCHOOL OR GED -   BARRIERS PRIMARY LEARNER NONE NONE -   PRIMARY LANGUAGE ENGLISH ENGLISH ENGLISH   LEARNER PREFERENCE PRIMARY READING LISTENING DEMONSTRATION     - READING -     - DEMONSTRATION -   ANSWERED BY rob Patient -   RELATIONSHIP SELF SELF -       Abuse Screening:  Abuse Screening Questionnaire 4/22/2019 7/31/2018 7/22/2015 Do you ever feel afraid of your partner? Y N N   Are you in a relationship with someone who physically or mentally threatens you? Y N N   Is it safe for you to go home? Jacque Cosme       Fall Risk  Fall Risk Assessment, last 12 mths 4/22/2019 2/4/2019 1/5/2019 12/19/2018 10/3/2018 7/31/2018 6/18/2018   Able to walk? Yes Yes Yes Yes Yes Yes Yes   Fall in past 12 months? No No No No No No No           Health Maintenance reviewed and discussed per provider. Yes    Ingrid Negron is due for   Health Maintenance Due   Topic Date Due    Influenza Age 5 to Adult  08/01/2019     Please order/place referral if appropriate. Advance Directive:  1. Do you have an advance directive in place? Patient Reply: no    2. If not, would you like material regarding how to put one in place? Patient Reply: no    Coordination of Care:  1. Have you been to the ER, urgent care clinic since your last visit? Hospitalized since your last visit? no    2. Have you seen or consulted any other health care providers outside of the [address] [place] [address] since your last visit? Include any pap smears or colon screening.  no

## 2019-11-11 NOTE — PROGRESS NOTES
HISTORY OF PRESENT ILLNESS  Anjelica Ngo is a 76 y.o. female. Visit Vitals  /79   Pulse 69   Temp 98.8 °F (37.1 °C) (Oral)   Resp 20   Ht 5' 8\" (1.727 m)   Wt 181 lb (82.1 kg)   SpO2 96%   BMI 27.52 kg/m²       She was sent to PT but she says it made her neck worse so she stopped. But I can not find any referral to PT or where  And she can not tell me who sent her there. By her description, it may have been [employer]      She reports some body part is constantly moving. She had not changed her routine, no new meds. Almost a constant fidgeting  I had made a referral to neurology regarding complaints of leg pain and hand paresthesias. No record of her having gone. She also is concerned about her memory. Says she seems more forgetful at times. Review of Systems   Constitutional: Negative for chills and fever. Cardiovascular: Negative for chest pain. Neurological: Positive for tingling and tremors. Psychiatric/Behavioral: Positive for memory loss (she feels her memory is not what it used to be.). Physical Exam   Constitutional: She is oriented to person, place, and time. She appears well-developed and well-nourished. No distress. Cardiovascular: Normal rate and regular rhythm. Pulmonary/Chest: Effort normal and breath sounds normal.   Musculoskeletal: She exhibits no edema. Hand joints show minor osteoarthritic changes   Neurological: She is alert and oriented to person, place, and time. Finger to nose intact. No gross neurological findings. Skin: Skin is warm and dry. She is not diaphoretic. Psychiatric: She has a normal mood and affect. Nursing note and vitals reviewed. ASSESSMENT and PLAN    ICD-10-CM ICD-9-CM    1. Paresthesia and pain of both upper extremities R20.2 782.0     M79.601 729.5     M79.602     2. Cervical disc disorder M50.90 722.91    3. Other headache syndrome G44.89 339.89    4.  Cognitive complaints R41.9 799.59 REFERRAL TO GERIATRICS       Will f/u on neurology referral for her paresthesia and complaint of movements    Will obtain xrays she did not get done last time    Reviewed recent lab    May need referral to psychology for cognitive assessment--will refer to [facility]. In April here, her mini-cog was OK. But sometimes she does appear distracted.     F/u one month

## 2019-11-20 DIAGNOSIS — Z76.0 MEDICATION REFILL: ICD-10-CM

## 2019-11-20 RX ORDER — ERGOCALCIFEROL 1.25 MG/1
CAPSULE ORAL
Qty: 12 CAP | Refills: 5 | Status: SHIPPED | OUTPATIENT
Start: 2019-11-20 | End: 2020-12-13

## 2019-11-29 ENCOUNTER — DOCUMENTATION ONLY (OUTPATIENT)
Dept: NEUROLOGY | Age: 74
End: 2019-11-29

## 2019-11-29 NOTE — PROGRESS NOTES
Chart review reveals scheduled new patient in visit to discuss paresthesia; as referred by Terrance Durand MD.

## 2019-12-09 ENCOUNTER — OFFICE VISIT (OUTPATIENT)
Dept: NEUROLOGY | Age: 74
End: 2019-12-09

## 2019-12-09 VITALS
HEIGHT: 68 IN | OXYGEN SATURATION: 96 % | HEART RATE: 70 BPM | BODY MASS INDEX: 27.28 KG/M2 | WEIGHT: 180 LBS | SYSTOLIC BLOOD PRESSURE: 130 MMHG | TEMPERATURE: 98.5 F | RESPIRATION RATE: 20 BRPM | DIASTOLIC BLOOD PRESSURE: 70 MMHG

## 2019-12-09 DIAGNOSIS — R20.0 NUMBNESS AND TINGLING IN BOTH HANDS: Primary | ICD-10-CM

## 2019-12-09 DIAGNOSIS — R20.2 NUMBNESS AND TINGLING IN BOTH HANDS: Primary | ICD-10-CM

## 2019-12-09 DIAGNOSIS — G24.9 DYSTONIA: ICD-10-CM

## 2019-12-09 RX ORDER — CYCLOBENZAPRINE HCL 10 MG
10 TABLET ORAL
Qty: 30 TAB | Refills: 1 | Status: SHIPPED | OUTPATIENT
Start: 2019-12-09 | End: 2020-06-09

## 2019-12-09 NOTE — LETTER
12/9/19 Patient: Chata Price YOB: 1945 Date of Visit: 12/9/2019 Bhavna Meraz MD 
[facility] [address] [facility] [phone] VIA In Basket Dear Bhavna Meraz MD, Thank you for referring Ms. Chata Price to [facility] for evaluation. My notes for this consultation are attached. If you have questions, please do not hesitate to call me. I look forward to following your patient along with you.  
 
 
Sincerely, 
 
Mirian Escobar MD
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
## 2019-12-13 ENCOUNTER — HOSPITAL ENCOUNTER (OUTPATIENT)
Dept: LAB | Age: 74
Discharge: HOME | End: 2019-12-13
Payer: MEDICARE

## 2019-12-13 ENCOUNTER — OFFICE VISIT (OUTPATIENT)
Dept: INTERNAL MEDICINE CLINIC | Age: 74
End: 2019-12-13

## 2019-12-13 VITALS
DIASTOLIC BLOOD PRESSURE: 75 MMHG | HEART RATE: 69 BPM | HEIGHT: 68 IN | BODY MASS INDEX: 27.43 KG/M2 | SYSTOLIC BLOOD PRESSURE: 124 MMHG | OXYGEN SATURATION: 94 % | TEMPERATURE: 98 F | WEIGHT: 181 LBS | RESPIRATION RATE: 20 BRPM

## 2019-12-13 DIAGNOSIS — R35.0 URINARY FREQUENCY: ICD-10-CM

## 2019-12-13 DIAGNOSIS — E11.9 TYPE 2 DIABETES MELLITUS WITHOUT COMPLICATION (HCC): Chronic | ICD-10-CM

## 2019-12-13 DIAGNOSIS — M54.50 BILATERAL LOW BACK PAIN WITHOUT SCIATICA, UNSPECIFIED CHRONICITY: ICD-10-CM

## 2019-12-13 DIAGNOSIS — R35.0 URINARY FREQUENCY: Primary | ICD-10-CM

## 2019-12-13 DIAGNOSIS — M79.89 RIGHT AXILLARY SWELLING: ICD-10-CM

## 2019-12-13 DIAGNOSIS — R20.2 PARESTHESIA: ICD-10-CM

## 2019-12-13 LAB
BILIRUB UR QL STRIP: NEGATIVE
GLUCOSE UR-MCNC: NEGATIVE MG/DL
KETONES P FAST UR STRIP-MCNC: NEGATIVE MG/DL
PH UR STRIP: 5 [PH] (ref 4.6–8)
PROT UR QL STRIP: NEGATIVE
SP GR UR STRIP: 1.01 (ref 1–1.03)
UA UROBILINOGEN AMB POC: NORMAL (ref 0.2–1)
URINALYSIS CLARITY POC: CLEAR
URINALYSIS COLOR POC: YELLOW
URINE BLOOD POC: NEGATIVE
URINE LEUKOCYTES POC: NEGATIVE
URINE NITRITES POC: NEGATIVE

## 2019-12-13 PROCEDURE — 87086 URINE CULTURE/COLONY COUNT: CPT

## 2019-12-13 NOTE — TELEPHONE ENCOUNTER
Patient is calling in requesting a prescription for test strip for her One Touch Ultra II meter.     Requested Prescriptions     Pending Prescriptions Disp Refills    glucose blood VI test strips (ONETOUCH ULTRA TEST) strip 100 Strip 5     Sig: Use to test blood sugar daily

## 2019-12-13 NOTE — PROGRESS NOTES
[facility] presents today for   Chief Complaint   Patient presents with    Arm Pain    Back Pain    Shoulder Pain    Urinary Frequency       Shelby Ross preferred language for health care discussion is english/other.     Is someone accompanying this pt? no    Is the patient using any DME equipment during [address]? no    Depression Screening:  3 most recent PHQ Screens 2/4/2019 1/5/2019 12/19/2018 10/3/2018 7/31/2018 6/18/2018 2/12/2018   PHQ Not Done - - - - - - -   Little interest or pleasure in doing things Not at all Not at all Not at all Not at all Not at all Not at all Not at all   Feeling down, depressed, irritable, or hopeless Not at all Not at all Not at all Not at all Not at all Not at all Not at all   Total Score PHQ 2 0 0 0 0 0 0 0   Trouble falling or staying asleep, or sleeping too much - - Not at all - - - -   Feeling tired or having little energy - - Not at all - - - -   Poor appetite, weight loss, or overeating - - Not at all - - - -   Feeling bad about yourself - or that you are a failure or have let yourself or your family down - - Not at all - - - -   Trouble concentrating on things such as school, work, reading, or watching TV - - Not at all - - - -   Moving or speaking so slowly that other people could have noticed; or the opposite being so fidgety that others notice - - Not at all - - - -   Thoughts of being better off dead, or hurting yourself in some way - - Not at all - - - -   PHQ 9 Score - - 0 - - - -       Learning Assessment:  Learning Assessment 1/22/2015 11/21/2014 4/4/2014   PRIMARY LEARNER Patient Patient Patient   HIGHEST LEVEL OF EDUCATION - PRIMARY LEARNER  - GRADUATED HIGH SCHOOL OR GED -   BARRIERS PRIMARY LEARNER NONE NONE -   PRIMARY LANGUAGE ENGLISH ENGLISH ENGLISH   LEARNER PREFERENCE PRIMARY READING LISTENING DEMONSTRATION     - READING -     - DEMONSTRATION -   ANSWERED BY rob Patient -   RELATIONSHIP SELF SELF -       Abuse Screening:  Abuse Screening Questionnaire 4/22/2019 7/31/2018 7/22/2015   Do you ever feel afraid of your partner? Y N N   Are you in a relationship with someone who physically or mentally threatens you? Y N N   Is it safe for you to go home? James Mccoy       Fall Risk  Fall Risk Assessment, last 12 mths 4/22/2019 2/4/2019 1/5/2019 12/19/2018 10/3/2018 7/31/2018 6/18/2018   Able to walk? Yes Yes Yes Yes Yes Yes Yes   Fall in past 12 months? No No No No No No No           Health Maintenance reviewed and discussed per provider. Yes    Christopher Fay is due for   Health Maintenance Due   Topic Date Due    Influenza Age 5 to Adult  08/01/2019     Please order/place referral if appropriate. Advance Directive:  1. Do you have an advance directive in place? Patient Reply: no    2. If not, would you like material regarding how to put one in place? Patient Reply: no    Coordination of Care:  1. Have you been to the ER, urgent care clinic since your last visit? Hospitalized since your last visit? no    2. Have you seen or consulted any other health care providers outside of the [address] [place] [address] since your last visit? Include any pap smears or colon screening.  no

## 2019-12-13 NOTE — PROGRESS NOTES
HISTORY OF PRESENT ILLNESS  Xavier Baker is a 76 y.o. female. Visit Vitals  /75 (BP 1 Location: Right arm, BP Patient Position: Sitting)   Pulse 69   Temp 98 °F (36.7 °C) (Oral)   Resp 20   Ht 5' 8\" (1.727 m)   Wt 181 lb (82.1 kg)   SpO2 94%   BMI 27.52 kg/m²       Pt here with multiple orthopedic complaints including arm and back pain. These are not new  But her back has flared--along the lower waist line. Under both feet and toe pads, there is some numbness. And the feel \"Strange\". This is going on about a month. Footwear does not seem to affect them. It is mostly at night. She is diabetic and this has been very good. She takes vitamins. Reports the fatty are under her right arm seems to be getting larger and it hurts all of the time. Allyson at night    Urinary Frequency    The history is provided by the patient (someitmes had to go often, Sometimes can't go without straining. No stinging or burning. No odor or color change). This is a new problem. The current episode started more than 1 week ago. The problem occurs intermittently. The problem has not changed since onset. There has been no fever. Associated symptoms include frequency and back pain (low back along belt line). Pertinent negatives include no chills, no hematuria and no flank pain. Review of Systems   Constitutional: Negative for chills and fever. Genitourinary: Positive for frequency. Negative for dysuria, flank pain and hematuria. Musculoskeletal: Positive for back pain (low back along belt line) and joint pain. Neurological: Positive for sensory change. Physical Exam  Vitals signs and nursing note reviewed. Constitutional:       General: She is not in acute distress. Appearance: She is well-developed. She is not diaphoretic. Cardiovascular:      Rate and Rhythm: Normal rate and regular rhythm. Pulmonary:      Effort: Pulmonary effort is normal.      Breath sounds: Normal breath sounds.    Chest: Musculoskeletal:      Comments: No significant back findings on her back    Feet also look good. Good pulses, no swelling. Skin:     General: Skin is warm and dry. Neurological:      Mental Status: She is alert and oriented to person, place, and time. Sensory: No sensory deficit. ASSESSMENT and PLAN    ICD-10-CM ICD-9-CM    1. Urinary frequency R35.0 788.41 AMB POC URINALYSIS DIP STICK AUTO W/ MICRO      CULTURE, URINE   2. Right axillary swelling M79.89 729.81 REFERRAL TO SURGERY   3. Bilateral low back pain without sciatica, unspecified chronicity M54.5 724.2    4. Paresthesia R20.2 782.0        Urine dip negative. Will send out culture    Low back pain--? Related to urine  NSAIDs prn    Refer to general surgery for the fullness under her right axilla    ? Early neuropathy  Already seeing Dr. Jv Putnam for arm sxs.  May be able to see as well for her foot complaints    F/u 4-6 weeks for DM, foot check, recheck urine

## 2019-12-15 LAB
BACTERIA SPEC CULT: NORMAL
SERVICE CMNT-IMP: NORMAL

## 2020-01-13 ENCOUNTER — TELEPHONE (OUTPATIENT)
Dept: INTERNAL MEDICINE CLINIC | Age: 75
End: 2020-01-13

## 2020-01-13 DIAGNOSIS — L65.9 THINNING HAIR: Primary | ICD-10-CM

## 2020-01-14 NOTE — TELEPHONE ENCOUNTER
Patient will call back when she home she stated she have a list of problems and would like to rule out thyroid

## 2020-01-14 NOTE — TELEPHONE ENCOUNTER
WHY?  She must have a valid reason (symptom or hx) for a thyroid test. Otherwise it will not be covered by her insurance. Do not give her reasons (in other words, do not prompt her).  It must be in her own words

## 2020-01-14 NOTE — TELEPHONE ENCOUNTER
Patient stated she experiencing the follow :       Hair loss scalp and eyebrows   Digestive issue  (Stomach hurts)  Always tired   Sensitive to heat and cold  Weight loss ( 2 sizes )

## 2020-01-14 NOTE — TELEPHONE ENCOUNTER
She has NOT lost 2 dress sizes in the last year. Actually only 5-6 pounds lost in the last year. The other complaints can be explained by aging. Stomach hurting is NOT a sign of thyroid disease. But I do not see a level in over a year so will order it. She can come here or to [facility].

## 2020-01-15 NOTE — TELEPHONE ENCOUNTER
2 pt identifiers confirmed. Pt informed that lab has been ordered. Pt states that she will probably come in tomorrow.

## 2020-01-16 ENCOUNTER — HOSPITAL ENCOUNTER (OUTPATIENT)
Dept: LAB | Age: 75
Discharge: HOME | End: 2020-01-16
Payer: MEDICARE

## 2020-01-16 DIAGNOSIS — L65.9 THINNING HAIR: ICD-10-CM

## 2020-01-16 LAB
T4 FREE SERPL-MCNC: 0.8 NG/DL (ref 0.7–1.5)
TSH SERPL DL<=0.05 MIU/L-ACNC: 3.7 UIU/ML (ref 0.36–3.74)

## 2020-01-16 PROCEDURE — 84439 ASSAY OF FREE THYROXINE: CPT

## 2020-01-16 PROCEDURE — 36415 COLL VENOUS BLD VENIPUNCTURE: CPT

## 2020-01-28 ENCOUNTER — OFFICE VISIT (OUTPATIENT)
Dept: INTERNAL MEDICINE CLINIC | Age: 75
End: 2020-01-28

## 2020-01-28 VITALS
SYSTOLIC BLOOD PRESSURE: 138 MMHG | RESPIRATION RATE: 20 BRPM | BODY MASS INDEX: 27.74 KG/M2 | OXYGEN SATURATION: 98 % | WEIGHT: 183 LBS | TEMPERATURE: 97.6 F | DIASTOLIC BLOOD PRESSURE: 76 MMHG | HEIGHT: 68 IN | HEART RATE: 75 BPM

## 2020-01-28 DIAGNOSIS — E11.9 TYPE 2 DIABETES MELLITUS WITHOUT COMPLICATION, WITHOUT LONG-TERM CURRENT USE OF INSULIN (HCC): Primary | ICD-10-CM

## 2020-01-28 DIAGNOSIS — R20.2 PARESTHESIA AND PAIN OF BOTH UPPER EXTREMITIES: ICD-10-CM

## 2020-01-28 DIAGNOSIS — M79.601 PARESTHESIA AND PAIN OF BOTH UPPER EXTREMITIES: ICD-10-CM

## 2020-01-28 DIAGNOSIS — M79.602 PARESTHESIA AND PAIN OF BOTH UPPER EXTREMITIES: ICD-10-CM

## 2020-01-28 DIAGNOSIS — R20.2 PARESTHESIA OF BOTH HANDS: ICD-10-CM

## 2020-01-28 DIAGNOSIS — E78.5 DYSLIPIDEMIA: ICD-10-CM

## 2020-01-28 NOTE — PROGRESS NOTES
HISTORY OF PRESENT ILLNESS  Hero Ortiz is a 76 y.o. female. Visit Vitals  /76 (BP 1 Location: Right arm)   Pulse 75   Temp 97.6 °F (36.4 °C) (Oral)   Resp 20   Ht 5' 8\" (1.727 m)   Wt 183 lb (83 kg)   SpO2 98%   BMI 27.83 kg/m²                 Current Outpatient Medications:  simvastatin (ZOCOR) 20 mg tablet, TAKE 1 TABLET BY MOUTH ONCE DAILY AT BEDTIME  glucose blood VI test strips (ONETOUCH ULTRA TEST) strip, Use to test blood sugar daily  cyclobenzaprine (FLEXERIL) 10 mg tablet, Take 1 Tab by mouth nightly.  ergocalciferol (VITAMIN D2) 50,000 unit capsule, TAKE 1 CAPSULE BY MOUTH ONCE A WEEK  metFORMIN ER (GLUCOPHAGE XR) 500 mg tablet, TAKE ONE TABLET BY MOUTH ONCE DAILY WITH SUPPER  estradiol (CLIMARA) 0.1 mg/24 hr, 1 Patch by TransDERmal route every seven (7) days. Indications: vaginal inflammation due to loss of hormone stimulation  albuterol (PROVENTIL HFA, VENTOLIN HFA, PROAIR HFA) 90 mcg/actuation inhaler, INHALE 2 PUFFS BY MOUTH EVERY 6 HOURS AS NEEDED FOR WHEEZING FOR SHORTNESS OF BREATH  inhalational spacing device, Use spacer every time you use your inhaler  fluticasone (FLONASE) 50 mcg/actuation nasal spray, 2 Sprays by Both Nostrils route daily. Indications: ALLERGIC RHINITIS  rOPINIRole (REQUIP) 0.5 mg tablet, Take 1 Tab by mouth two (2) times daily as needed. Indications: Restless Legs Syndrome  aspirin (ASPIRIN) 325 mg tablet, Take 2 Tabs by mouth two (2) times a day. (Patient taking differently: Take 325 mg by mouth daily.)  multivitamin (ONE A DAY) tablet, Take 1 Tab by mouth daily. Blood-Glucose Meter (ONETOUCH ULTRA2) monitoring kit, Use to test blood sugar daily  Lancing Device with Lancets (ONE TOUCH DELICA) kit, Use to test blood sugar daily        Hypertension    The history is provided by the patient. This is a chronic problem. The current episode started more than 1 week ago. The problem has not changed since onset. Pertinent negatives include no chest pain and no palpitations. There are no associated agents to hypertension. Risk factors include obesity, postmenopause, hypertension and diabetes mellitus. Diabetes   The history is provided by the patient. This is a chronic problem. The current episode started more than 1 week ago. The problem occurs daily. The problem has not changed since onset. Pertinent negatives include no chest pain. Exacerbated by: diet. The symptoms are relieved by medications (diet). Treatments tried: see meds. Numbness   The history is provided by the patient (goes numb down boht hands and arms especially at night). This is a recurrent problem. The current episode started more than 1 week ago. Pertinent negatives include no chest pain. Review of Systems   Constitutional: Negative. Cardiovascular: Negative for chest pain and palpitations. Genitourinary: Positive for frequency. Neurological: Positive for tingling, sensory change and numbness. Endo/Heme/Allergies: Negative for polydipsia. Physical Exam  Vitals signs and nursing note reviewed. Constitutional:       Appearance: She is well-developed. Cardiovascular:      Rate and Rhythm: Normal rate and regular rhythm. Pulmonary:      Effort: Pulmonary effort is normal.      Breath sounds: Normal breath sounds. Musculoskeletal:      Comments: Neck pain off and on   Skin:     General: Skin is warm and dry. Neurological:      General: No focal deficit present. Mental Status: She is alert and oriented to person, place, and time. Comments: Good hand   NML reflexes in arms  Neg Tinel's at the wrists   Psychiatric:         Mood and Affect: Mood normal.         ASSESSMENT and PLAN    ICD-10-CM ICD-9-CM    1. Type 2 diabetes mellitus without complication, without long-term current use of insulin ([facility]) E11.9 250.00    2. Dyslipidemia E78.5 272.4    3. Paresthesia and pain of both upper extremities R20.2 782.0 REFERRAL TO NEUROLOGY    M79.601 729.5     M79.602     4. Paresthesia of both hands R20.2 782.0 REFERRAL TO NEUROLOGY       Reviewed lab. Will hold off repeating today    Will refer to neurology for her hand paresthesias. Reviewed Dr. Melody Calloway note. He planned an EMG  ? Neck vs peripheral neuropathy  She does not want to go back to [facility] due to cost of travelling the [facility].     F/u 3 months for MWV, HTN, DM,

## 2020-01-28 NOTE — PROGRESS NOTES
ROOM # 102 [facility] presents today for   Chief Complaint   Patient presents with    Hypertension    Diabetes    Numbness     foot       [place] preferred language for health care discussion is english/other.     Is someone accompanying this pt? no    Is the patient using any DME equipment during [address]? no    Depression Screening:  3 most recent PHQ Screens 2/4/2019 1/5/2019 12/19/2018 10/3/2018 7/31/2018 6/18/2018 2/12/2018   PHQ Not Done - - - - - - -   Little interest or pleasure in doing things Not at all Not at all Not at all Not at all Not at all Not at all Not at all   Feeling down, depressed, irritable, or hopeless Not at all Not at all Not at all Not at all Not at all Not at all Not at all   Total Score PHQ 2 0 0 0 0 0 0 0   Trouble falling or staying asleep, or sleeping too much - - Not at all - - - -   Feeling tired or having little energy - - Not at all - - - -   Poor appetite, weight loss, or overeating - - Not at all - - - -   Feeling bad about yourself - or that you are a failure or have let yourself or your family down - - Not at all - - - -   Trouble concentrating on things such as school, work, reading, or watching TV - - Not at all - - - -   Moving or speaking so slowly that other people could have noticed; or the opposite being so fidgety that others notice - - Not at all - - - -   Thoughts of being better off dead, or hurting yourself in some way - - Not at all - - - -   PHQ 9 Score - - 0 - - - -       Learning Assessment:  Learning Assessment 1/22/2015 11/21/2014 4/4/2014   PRIMARY LEARNER Patient Patient Patient   HIGHEST LEVEL OF EDUCATION - PRIMARY LEARNER  - GRADUATED HIGH SCHOOL OR GED -   BARRIERS PRIMARY LEARNER NONE NONE -   PRIMARY LANGUAGE ENGLISH ENGLISH ENGLISH   LEARNER PREFERENCE PRIMARY READING LISTENING DEMONSTRATION     - READING -     - DEMONSTRATION -   ANSWERED BY rob Patient -   RELATIONSHIP SELF SELF -       Abuse Screening:  Abuse Screening Questionnaire 4/22/2019 7/31/2018 7/22/2015   Do you ever feel afraid of your partner? Y N N   Are you in a relationship with someone who physically or mentally threatens you? Y N N   Is it safe for you to go home? Greta Bassett       Fall Risk  Fall Risk Assessment, last 12 mths 4/22/2019 2/4/2019 1/5/2019 12/19/2018 10/3/2018 7/31/2018 6/18/2018   Able to walk? Yes Yes Yes Yes Yes Yes Yes   Fall in past 12 months? No No No No No No No           Health Maintenance reviewed and discussed per provider. Yes    Hero Ortiz is due for   Health Maintenance Due   Topic Date Due    Influenza Age 5 to Adult  08/01/2019    GLAUCOMA SCREENING Q2Y  02/27/2020    EYE EXAM RETINAL OR DILATED  02/27/2020     Please order/place referral if appropriate. Advance Directive:  1. Do you have an advance directive in place? Patient Reply: no    2. If not, would you like material regarding how to put one in place? Patient Reply: no    Coordination of Care:  1. Have you been to the ER, urgent care clinic since your last visit? Hospitalized since your last visit? yes    2. Have you seen or consulted any other health care providers outside of the [address] [place] [address] since your last visit? Include any pap smears or colon screening.  no

## 2020-02-17 ENCOUNTER — HOSPITAL ENCOUNTER (OUTPATIENT)
Dept: GENERAL RADIOLOGY | Age: 75
Discharge: HOME | End: 2020-02-17
Payer: MEDICARE

## 2020-02-17 DIAGNOSIS — R13.10 DYSPHAGIA: ICD-10-CM

## 2020-02-17 PROCEDURE — 74011000255 HC RX REV CODE- 255

## 2020-02-17 PROCEDURE — 74246 X-RAY XM UPR GI TRC 2CNTRST: CPT

## 2020-02-17 PROCEDURE — 74011000250 HC RX REV CODE- 250

## 2020-02-17 RX ADMIN — BARIUM SULFATE 135 ML: 980 POWDER, FOR SUSPENSION ORAL at 10:12

## 2020-02-17 RX ADMIN — BARIUM SULFATE 176 G: 960 POWDER, FOR SUSPENSION ORAL at 10:12

## 2020-02-17 RX ADMIN — ANTACID/ANTIFLATULENT 4 G: 380; 550; 10; 10 GRANULE, EFFERVESCENT ORAL at 10:12

## 2020-02-17 RX ADMIN — BARIUM SULFATE 700 MG: 700 TABLET ORAL at 10:17

## 2020-02-20 ENCOUNTER — HOSPITAL ENCOUNTER (OUTPATIENT)
Dept: ULTRASOUND IMAGING | Age: 75
Discharge: HOME | End: 2020-02-20
Payer: MEDICARE

## 2020-02-20 ENCOUNTER — HOSPITAL ENCOUNTER (OUTPATIENT)
Dept: MAMMOGRAPHY | Age: 75
Discharge: HOME | End: 2020-02-20
Payer: MEDICARE

## 2020-02-20 DIAGNOSIS — R92.8 ABNORMAL FINDINGS ON DIAGNOSTIC IMAGING OF BREAST: ICD-10-CM

## 2020-02-20 DIAGNOSIS — R22.9 LOCALIZED SKIN MASS, LUMP, OR SWELLING: ICD-10-CM

## 2020-02-20 DIAGNOSIS — R92.8 ABNORMAL FINDING ON BREAST IMAGING: ICD-10-CM

## 2020-02-20 PROCEDURE — 88305 TISSUE EXAM BY PATHOLOGIST: CPT

## 2020-02-20 PROCEDURE — 19083 BX BREAST 1ST LESION US IMAG: CPT

## 2020-02-20 PROCEDURE — 77065 DX MAMMO INCL CAD UNI: CPT

## 2020-02-20 PROCEDURE — 76942 ECHO GUIDE FOR BIOPSY: CPT

## 2020-02-20 PROCEDURE — 74011000250 HC RX REV CODE- 250: Performed by: RADIOLOGY

## 2020-02-20 PROCEDURE — 76642 ULTRASOUND BREAST LIMITED: CPT

## 2020-02-20 RX ORDER — LIDOCAINE HYDROCHLORIDE AND EPINEPHRINE 10; 10 MG/ML; UG/ML
10 INJECTION, SOLUTION INFILTRATION; PERINEURAL
Status: COMPLETED | OUTPATIENT
Start: 2020-02-20 | End: 2020-02-20

## 2020-02-20 RX ORDER — LIDOCAINE HYDROCHLORIDE 10 MG/ML
10 INJECTION INFILTRATION; PERINEURAL
Status: DISCONTINUED | OUTPATIENT
Start: 2020-02-20 | End: 2020-02-20 | Stop reason: SDUPTHER

## 2020-02-20 RX ORDER — LIDOCAINE HYDROCHLORIDE 10 MG/ML
10 INJECTION INFILTRATION; PERINEURAL
Status: COMPLETED | OUTPATIENT
Start: 2020-02-20 | End: 2020-02-20

## 2020-02-20 RX ORDER — LIDOCAINE HYDROCHLORIDE AND EPINEPHRINE 10; 10 MG/ML; UG/ML
10 INJECTION, SOLUTION INFILTRATION; PERINEURAL
Status: DISCONTINUED | OUTPATIENT
Start: 2020-02-20 | End: 2020-02-20 | Stop reason: SDUPTHER

## 2020-02-20 RX ADMIN — LIDOCAINE HYDROCHLORIDE,EPINEPHRINE BITARTRATE 10 ML: 10; .01 INJECTION, SOLUTION INFILTRATION; PERINEURAL at 11:37

## 2020-02-20 RX ADMIN — LIDOCAINE HYDROCHLORIDE 6 ML: 10 INJECTION, SOLUTION INFILTRATION; PERINEURAL at 11:32

## 2020-03-09 ENCOUNTER — HOSPITAL ENCOUNTER (OUTPATIENT)
Dept: ULTRASOUND IMAGING | Age: 75
Discharge: HOME | End: 2020-03-09
Payer: MEDICARE

## 2020-03-09 DIAGNOSIS — R10.9 RIGHT FLANK PAIN: ICD-10-CM

## 2020-03-09 PROCEDURE — 76775 US EXAM ABDO BACK WALL LIM: CPT

## 2020-03-27 ENCOUNTER — TELEPHONE (OUTPATIENT)
Dept: INTERNAL MEDICINE CLINIC | Age: 75
End: 2020-03-27

## 2020-04-07 ENCOUNTER — TELEPHONE (OUTPATIENT)
Dept: INTERNAL MEDICINE CLINIC | Age: 75
End: 2020-04-07

## 2020-04-07 NOTE — TELEPHONE ENCOUNTER
Called patient to RS the [address] for today since she is unable to do a video chat. Patient states she needs to see you in reference to her (R) side pain. States she told the  that when this was originally RS'd.   Can this be done over the phone or does she need to come in?

## 2020-06-09 ENCOUNTER — OFFICE VISIT (OUTPATIENT)
Dept: INTERNAL MEDICINE CLINIC | Age: 75
End: 2020-06-09

## 2020-06-09 VITALS
HEIGHT: 68 IN | OXYGEN SATURATION: 97 % | HEART RATE: 70 BPM | SYSTOLIC BLOOD PRESSURE: 142 MMHG | RESPIRATION RATE: 16 BRPM | WEIGHT: 183 LBS | TEMPERATURE: 98.2 F | DIASTOLIC BLOOD PRESSURE: 68 MMHG | BODY MASS INDEX: 27.74 KG/M2

## 2020-06-09 DIAGNOSIS — E78.5 DYSLIPIDEMIA: Chronic | ICD-10-CM

## 2020-06-09 DIAGNOSIS — R10.13 EPIGASTRIC PAIN: ICD-10-CM

## 2020-06-09 DIAGNOSIS — Z00.00 MEDICARE ANNUAL WELLNESS VISIT, SUBSEQUENT: Primary | ICD-10-CM

## 2020-06-09 DIAGNOSIS — E55.9 VITAMIN D DEFICIENCY: ICD-10-CM

## 2020-06-09 DIAGNOSIS — E11.9 TYPE 2 DIABETES MELLITUS WITHOUT COMPLICATION, WITHOUT LONG-TERM CURRENT USE OF INSULIN (HCC): Chronic | ICD-10-CM

## 2020-06-09 NOTE — PROGRESS NOTES
HISTORY OF PRESENT ILLNESS  Austyn Reed is a 76 y.o. female. Visit Vitals  /68 (BP 1 Location: Right arm, BP Patient Position: Sitting)   Pulse 70   Temp 98.2 °F (36.8 °C) (Temporal)   Resp 16   Ht 5' 8\" (1.727 m)   Wt 183 lb (83 kg)   SpO2 97%   BMI 27.83 kg/m²             Current Outpatient Medications:  glucose blood VI test strips (ONETOUCH ULTRA TEST) strip, Use to test blood sugar daily  ergocalciferol (VITAMIN D2) 50,000 unit capsule, TAKE 1 CAPSULE BY MOUTH ONCE A WEEK  metFORMIN ER (GLUCOPHAGE XR) 500 mg tablet, TAKE ONE TABLET BY MOUTH ONCE DAILY WITH SUPPER  estradiol (CLIMARA) 0.1 mg/24 hr, 1 Patch by TransDERmal route every seven (7) days. Indications: vaginal inflammation due to loss of hormone stimulation  albuterol (PROVENTIL HFA, VENTOLIN HFA, PROAIR HFA) 90 mcg/actuation inhaler, INHALE 2 PUFFS BY MOUTH EVERY 6 HOURS AS NEEDED FOR WHEEZING FOR SHORTNESS OF BREATH  inhalational spacing device, Use spacer every time you use your inhaler  fluticasone (FLONASE) 50 mcg/actuation nasal spray, 2 Sprays by Both Nostrils route daily. Indications: ALLERGIC RHINITIS  rOPINIRole (REQUIP) 0.5 mg tablet, Take 1 Tab by mouth two (2) times daily as needed. Indications: Restless Legs Syndrome  aspirin (ASPIRIN) 325 mg tablet, Take 2 Tabs by mouth two (2) times a day. (Patient taking differently: Take 325 mg by mouth daily.)  multivitamin (ONE A DAY) tablet, Take 1 Tab by mouth daily. Blood-Glucose Meter (ONETOUCH ULTRA2) monitoring kit, Use to test blood sugar daily  Lancing Device with Lancets (ONE TOUCH DELICA) kit, Use to test blood sugar daily  simvastatin (ZOCOR) 20 mg tablet, TAKE 1 TABLET BY MOUTH ONCE DAILY AT BEDTIME  cyclobenzaprine (FLEXERIL) 10 mg tablet, Take 1 Tab by mouth nightly.               Current Outpatient Medications:  glucose blood VI test strips (ONETOUCH ULTRA TEST) strip, Use to test blood sugar daily  ergocalciferol (VITAMIN D2) 50,000 unit capsule, TAKE 1 CAPSULE BY MOUTH ONCE A WEEK  metFORMIN ER (GLUCOPHAGE XR) 500 mg tablet, TAKE ONE TABLET BY MOUTH ONCE DAILY WITH SUPPER  estradiol (CLIMARA) 0.1 mg/24 hr, 1 Patch by TransDERmal route every seven (7) days. Indications: vaginal inflammation due to loss of hormone stimulation  albuterol (PROVENTIL HFA, VENTOLIN HFA, PROAIR HFA) 90 mcg/actuation inhaler, INHALE 2 PUFFS BY MOUTH EVERY 6 HOURS AS NEEDED FOR WHEEZING FOR SHORTNESS OF BREATH  inhalational spacing device, Use spacer every time you use your inhaler  fluticasone (FLONASE) 50 mcg/actuation nasal spray, 2 Sprays by Both Nostrils route daily. Indications: ALLERGIC RHINITIS  rOPINIRole (REQUIP) 0.5 mg tablet, Take 1 Tab by mouth two (2) times daily as needed. Indications: Restless Legs Syndrome  aspirin (ASPIRIN) 325 mg tablet, Take 2 Tabs by mouth two (2) times a day. (Patient taking differently: Take 325 mg by mouth daily.)  multivitamin (ONE A DAY) tablet, Take 1 Tab by mouth daily. Blood-Glucose Meter (ONETOUCH ULTRA2) monitoring kit, Use to test blood sugar daily  Lancing Device with Lancets (ONE TOUCH DELICA) kit, Use to test blood sugar daily  simvastatin (ZOCOR) 20 mg tablet, TAKE 1 TABLET BY MOUTH ONCE DAILY AT BEDTIME    No current facility-administered medications for this visit. Diabetes   The history is provided by the patient. The current episode started more than 1 week ago. The problem occurs daily. The problem has not changed since onset. Associated symptoms include abdominal pain (epigastric pain. Feels like her abdomen is getting larger. ). Pertinent negatives include no chest pain, no headaches and no shortness of breath. Exacerbated by: diet. The symptoms are relieved by medications (diet). Treatments tried: see med list.   Epigastric Pain    The history is provided by the patient. This is a recurrent problem. The current episode started more than 1 week ago. The problem occurs daily. The problem has not changed since onset. Associated symptoms include constipation (more irregular bowel habits. Sometimes has to take a laxative. ) and frequency (small but more frequent amounts. Drinks a lot of water). Pertinent negatives include no fever, no hematuria, no headaches and no chest pain. Review of Systems   Constitutional: Negative for chills and fever. Respiratory: Negative for cough and shortness of breath. Cardiovascular: Negative for chest pain and palpitations. Gastrointestinal: Positive for abdominal pain (epigastric pain. Feels like her abdomen is getting larger.) and constipation (more irregular bowel habits. Sometimes has to take a laxative. ). \"always\" has hunger pangs. Water even causes some nausea   Genitourinary: Positive for frequency (small but more frequent amounts. Drinks a lot of water). Negative for hematuria and urgency. Skin:        Face flushes on occasion. Not associated with a hot flash   Neurological: Negative for dizziness and headaches. Endo/Heme/Allergies: Negative for polydipsia. Physical Exam  Vitals signs and nursing note reviewed. Constitutional:       Appearance: She is well-developed. Cardiovascular:      Rate and Rhythm: Normal rate and regular rhythm. Pulmonary:      Effort: Pulmonary effort is normal.      Breath sounds: Normal breath sounds. Abdominal:      General: Abdomen is flat. There is no distension. Tenderness: There is abdominal tenderness (mild epigastric ). Skin:     General: Skin is warm and dry. Neurological:      General: No focal deficit present. Mental Status: She is alert and oriented to person, place, and time.       Comments: Diabetic foot exam:     Left Foot:   Visual Exam: mild bunion   Pulse DP: 2+ (normal)   Filament test: normal sensation    Vibratory sensation: diminished      Right Foot:   Visual Exam: bunion   Pulse DP: 2+ (normal)   Filament test: normal sensation    Vibratory sensation: diminished     Psychiatric:         Mood and Affect: Mood normal.         ASSESSMENT and PLAN    ICD-10-CM ICD-9-CM           2. Type 2 diabetes mellitus without complication, without long-term current use of insulin ([facility]) W06.0 795.64 METABOLIC PANEL, COMPREHENSIVE      LIPID PANEL      HEMOGLOBIN A1C W/O EAG      HM DIABETES FOOT EXAM   3. Dyslipidemia E78.5 272.4 LIPID PANEL   4. Vitamin D deficiency E55.9 268.9 VITAMIN D, 25 HYDROXY   5. Epigastric pain R10.13 789.06 AMYLASE      LIPASE      CT ABD W WO CONT      CBC WITH AUTOMATED DIFF       Pt has not been taking her statin. Diabetes--due for updated lab    epigastric pain--check CT scan. ABdominal u/s earlier this year did not see much  She has a lot of anxiety over zantac and the recall. --worried she has cancer. Last CT for similar sxs showed a large amount of stool in the colon  Consider referral to GI    Many non-specific complaints such as abdominal bloating, irregular bowel movement, ane urinary frequency most likely are due to aging changes in physiology and anatomy.     F/u one month for recheck

## 2020-06-09 NOTE — PATIENT INSTRUCTIONS
Medicare Wellness Visit, Female     The best way to live healthy is to have a lifestyle where you eat a well-balanced diet, exercise regularly, limit alcohol use, and quit all forms of tobacco/nicotine, if applicable. Regular preventive services are another way to keep healthy. Preventive services (vaccines, screening tests, monitoring & exams) can help personalize your care plan, which helps you manage your own care. Screening tests can find health problems at the earliest stages, when they are easiest to treat. [facility] follows the current, evidence-based guidelines published by the [facility] ([facility]) when recommending preventive services for our patients. Because we follow these guidelines, sometimes recommendations change over time as research supports it. (For example, mammograms used to be recommended annually. Even though Medicare will still pay for an annual mammogram, the newer guidelines recommend a mammogram every two years for women of average risk). Of course, you and your doctor may decide to screen more often for some diseases, based on your risk and your co-morbidities (chronic disease you are already diagnosed with). Preventive services for you include:  - Medicare offers their members a free annual wellness visit, which is time for you and your primary care provider to discuss and plan for your preventive service needs. Take advantage of this benefit every year!  -All adults over the age of 72 should receive the recommended pneumonia vaccines. Current USPSTF guidelines recommend a series of two vaccines for the best pneumonia protection.   -All adults should have a flu vaccine yearly and a tetanus vaccine every 10 years.   -All adults age 48 and older should receive the shingles vaccines (series of two vaccines).       -All adults age 38-68 who are overweight should have a diabetes screening test once every three years.   -All adults born between [birth year] and 1965 should be screened once for Hepatitis C.  -Other screening tests and preventive services for persons with diabetes include: an eye exam to screen for diabetic retinopathy, a kidney function test, a foot exam, and stricter control over your cholesterol.   -Cardiovascular screening for adults with routine risk involves an electrocardiogram (ECG) at intervals determined by your doctor.   -Colorectal cancer screenings should be done for adults age 54-65 with no increased risk factors for colorectal cancer. There are a number of acceptable methods of screening for this type of cancer. Each test has its own benefits and drawbacks. Discuss with your doctor what is most appropriate for you during your annual wellness visit. The different tests include: colonoscopy (considered the best screening method), a fecal occult blood test, a fecal DNA test, and sigmoidoscopy.    -A bone mass density test is recommended when a woman turns 65 to screen for osteoporosis. This test is only recommended one time, as a screening. Some providers will use this same test as a disease monitoring tool if you already have osteoporosis. -Breast cancer screenings are recommended every other year for women of normal risk, age 54-69.  -Cervical cancer screenings for women over age 72 are only recommended with certain risk factors.      Here is a list of your current Health Maintenance items (your personalized list of preventive services) with a due date:  Health Maintenance Due   Topic Date Due    Glaucoma Screening   02/27/2020    Eye Exam  02/27/2020    Annual Well Visit  04/22/2020    Diabetic Foot Care  06/18/2020

## 2020-06-09 NOTE — PROGRESS NOTES
ROOM # 6  Identified pt with two pt identifiers(name and ). Reviewed record in preparation for visit and have obtained necessary documentation. Chief Complaint   Patient presents with   [address] preferred language for health care discussion is english/other. Is the patient using any DME equipment during OV? Apryl Avendano is due for:  Health Maintenance Due   Topic    GLAUCOMA SCREENING Q2Y     Eye Exam Retinal or Dilated     Medicare Yearly Exam     Foot Exam Q1      Health Maintenance reviewed and discussed per provider  Please order/place referral if appropriate. Advance Directive:  1. Do you have an advance directive in place? Patient Reply: NO    2. If not, would you like material regarding how to put one in place? NO    Coordination of Care:  1. Have you been to the ER, urgent care clinic since your last visit? Hospitalized since your last visit? NO    2. Have you seen or consulted any other health care providers outside of the [address] [place] [address] since your last visit? Include any pap smears or colon screening. NO    Patient is accompanied by self I have received verbal consent from Akosua Alatorre to discuss any/all medical information while they are present in the room.     Learning Assessment:  Learning Assessment 2014   PRIMARY LEARNER Patient Patient Patient   HIGHEST LEVEL OF EDUCATION - PRIMARY LEARNER  - GRADUATED HIGH SCHOOL OR GED -   BARRIERS PRIMARY LEARNER NONE NONE -   PRIMARY LANGUAGE ENGLISH ENGLISH ENGLISH   LEARNER PREFERENCE PRIMARY READING LISTENING DEMONSTRATION     - READING -     - DEMONSTRATION -   ANSWERED BY rob Patient -   RELATIONSHIP SELF SELF -     Depression Screening:  3 most recent [facility] Screens 2020 2019 2019 2018 10/3/2018 2018 2018   PHQ Not Done - - - - - - -   Little interest or pleasure in doing things Not at all Not at all Not at all Not at all Not at all Not at all Not at all   Feeling down, depressed, irritable, or hopeless Not at all Not at all Not at all Not at all Not at all Not at all Not at all   Total Score PHQ 2 0 0 0 0 0 0 0   Trouble falling or staying asleep, or sleeping too much - - - Not at all - - -   Feeling tired or having little energy - - - Not at all - - -   Poor appetite, weight loss, or overeating - - - Not at all - - -   Feeling bad about yourself - or that you are a failure or have let yourself or your family down - - - Not at all - - -   Trouble concentrating on things such as school, work, reading, or watching TV - - - Not at all - - -   Moving or speaking so slowly that other people could have noticed; or the opposite being so fidgety that others notice - - - Not at all - - -   Thoughts of being better off dead, or hurting yourself in some way - - - Not at all - - -   PHQ 9 Score - - - 0 - - -     Abuse Screening:  Abuse Screening Questionnaire 4/22/2019 7/31/2018 7/22/2015   Do you ever feel afraid of your partner? Y N N   Are you in a relationship with someone who physically or mentally threatens you? Y N N   Is it safe for you to go home? Shauna Blakely     Fall Risk  Fall Risk Assessment, last 12 mths 6/9/2020 4/22/2019 2/4/2019 1/5/2019 12/19/2018 10/3/2018 7/31/2018   Able to walk? Yes Yes Yes Yes Yes Yes Yes   Fall in past 12 months?  No No No No No No No
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
## 2020-06-29 ENCOUNTER — OFFICE VISIT (OUTPATIENT)
Dept: INTERNAL MEDICINE CLINIC | Age: 75
End: 2020-06-29

## 2020-06-29 ENCOUNTER — HOSPITAL ENCOUNTER (OUTPATIENT)
Dept: LAB | Age: 75
Discharge: HOME | End: 2020-06-29

## 2020-06-29 VITALS
BODY MASS INDEX: 27.13 KG/M2 | OXYGEN SATURATION: 97 % | HEART RATE: 68 BPM | SYSTOLIC BLOOD PRESSURE: 137 MMHG | WEIGHT: 179 LBS | TEMPERATURE: 96.7 F | RESPIRATION RATE: 18 BRPM | DIASTOLIC BLOOD PRESSURE: 63 MMHG | HEIGHT: 68 IN

## 2020-06-29 DIAGNOSIS — E11.9 TYPE 2 DIABETES MELLITUS WITHOUT COMPLICATION, WITHOUT LONG-TERM CURRENT USE OF INSULIN (HCC): Chronic | ICD-10-CM

## 2020-06-29 DIAGNOSIS — Z76.0 MEDICATION REFILL: ICD-10-CM

## 2020-06-29 DIAGNOSIS — M25.562 LEFT KNEE PAIN, UNSPECIFIED CHRONICITY: Primary | ICD-10-CM

## 2020-06-29 DIAGNOSIS — M25.462 KNEE EFFUSION, LEFT: ICD-10-CM

## 2020-06-29 LAB — XX-LABCORP SPECIMEN COL,LCBCF: NORMAL

## 2020-06-29 PROCEDURE — 99001 SPECIMEN HANDLING PT-LAB: CPT

## 2020-06-29 RX ORDER — ESTRADIOL 0.1 MG/D
PATCH TRANSDERMAL
Qty: 12 PATCH | Refills: 4 | Status: SHIPPED | OUTPATIENT
Start: 2020-06-29 | End: 2021-07-04 | Stop reason: SDUPTHER

## 2020-06-29 RX ORDER — PREDNISONE 10 MG/1
TABLET ORAL
COMMUNITY
Start: 2020-06-16 | End: 2020-08-11

## 2020-06-29 RX ORDER — NAPROXEN 375 MG/1
TABLET ORAL
COMMUNITY
Start: 2020-06-16 | End: 2021-08-03

## 2020-06-29 RX ORDER — ESTRADIOL 0.1 MG/D
1 PATCH TRANSDERMAL
Qty: 12 PATCH | Refills: 5 | Status: SHIPPED | OUTPATIENT
Start: 2020-06-29 | End: 2021-07-04
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
## 2020-06-29 NOTE — PROGRESS NOTES
ROOM # [id number] [employer] presents today for   Chief Complaint   Patient presents with    [address] preferred language for health care discussion is english/other.     Is someone accompanying this pt? no    Is the patient using any DME equipment during OV? no    Depression Screening:  3 most recent PHQ Screens 6/29/2020 6/9/2020 2/4/2019 1/5/2019 12/19/2018 10/3/2018 7/31/2018   PHQ Not Done - - - - - - -   Little interest or pleasure in doing things Not at all Not at all Not at all Not at all Not at all Not at all Not at all   Feeling down, depressed, irritable, or hopeless Not at all Not at all Not at all Not at all Not at all Not at all Not at all   Total Score PHQ 2 0 0 0 0 0 0 0   Trouble falling or staying asleep, or sleeping too much - - - - Not at all - -   Feeling tired or having little energy - - - - Not at all - -   Poor appetite, weight loss, or overeating - - - - Not at all - -   Feeling bad about yourself - or that you are a failure or have let yourself or your family down - - - - Not at all - -   Trouble concentrating on things such as school, work, reading, or watching TV - - - - Not at all - -   Moving or speaking so slowly that other people could have noticed; or the opposite being so fidgety that others notice - - - - Not at all - -   Thoughts of being better off dead, or hurting yourself in some way - - - - Not at all - -   PHQ 9 Score - - - - 0 - -       Learning Assessment:  Learning Assessment 1/22/2015 11/21/2014 4/4/2014   PRIMARY LEARNER Patient Patient Patient   HIGHEST LEVEL OF EDUCATION - PRIMARY LEARNER  - GRADUATED HIGH SCHOOL OR GED -   BARRIERS PRIMARY LEARNER NONE NONE -   PRIMARY LANGUAGE ENGLISH ENGLISH ENGLISH   LEARNER PREFERENCE PRIMARY READING LISTENING DEMONSTRATION     - READING -     - DEMONSTRATION -   ANSWERED BY rob Patient -   RELATIONSHIP SELF SELF -       Abuse Screening:  Abuse Screening Questionnaire 4/22/2019 7/31/2018 7/22/2015   Do you ever feel afraid of your partner? Y N N   Are you in a relationship with someone who physically or mentally threatens you? Y N N   Is it safe for you to go home? Vashti Talavera       Fall Risk  Fall Risk Assessment, last 12 mths 6/9/2020 4/22/2019 2/4/2019 1/5/2019 12/19/2018 10/3/2018 7/31/2018   Able to walk? Yes Yes Yes Yes Yes Yes Yes   Fall in past 12 months? No No No No No No No           Health Maintenance reviewed and discussed per provider. Yes    David Marquez is due for   Health Maintenance Due   Topic Date Due    GLAUCOMA SCREENING Q2Y  02/27/2020    Eye Exam Retinal or Dilated  02/27/2020     Please order/place referral if appropriate. Advance Directive:  1. Do you have an advance directive in place? Patient Reply: no    2. If not, would you like material regarding how to put one in place? Patient Reply: no    Coordination of Care:  1. Have you been to the ER, urgent care clinic since your last visit? Hospitalized since your last visit? yes    2. Have you seen or consulted any other health care providers outside of the [address] [place] [address] since your last visit? Include any pap smears or colon screening.  No    PATIENT STATES THAT ER GAVE HER TIZANIDINE AND PT STOPPED TAKING IT BECAUSE IT ALMOST MADE HER FALL

## 2020-06-30 LAB
BUN SERPL-MCNC: 18 MG/DL (ref 8–27)
BUN/CREAT SERPL: 24 (ref 12–28)
CALCIUM SERPL-MCNC: 9.8 MG/DL (ref 8.7–10.3)
CHLORIDE SERPL-SCNC: 101 MMOL/L (ref 96–106)
CHOLEST SERPL-MCNC: 289 MG/DL (ref 100–199)
CO2 SERPL-SCNC: 24 MMOL/L (ref 20–29)
CREAT SERPL-MCNC: 0.74 MG/DL (ref 0.57–1)
GLUCOSE SERPL-MCNC: 131 MG/DL (ref 65–99)
HBA1C MFR BLD: 6.3 % (ref 4.8–5.6)
HDLC SERPL-MCNC: 73 MG/DL
INTERPRETATION, 910389: NORMAL
LDLC SERPL CALC-MCNC: 188 MG/DL (ref 0–99)
POTASSIUM SERPL-SCNC: 4 MMOL/L (ref 3.5–5.2)
SODIUM SERPL-SCNC: 139 MMOL/L (ref 134–144)
TRIGL SERPL-MCNC: 140 MG/DL (ref 0–149)
VLDLC SERPL CALC-MCNC: 28 MG/DL (ref 5–40)

## 2020-07-02 ENCOUNTER — TELEPHONE (OUTPATIENT)
Dept: INTERNAL MEDICINE CLINIC | Age: 75
End: 2020-07-02

## 2020-07-02 NOTE — TELEPHONE ENCOUNTER
Spoke with patient and informed her that the dr office dont get the auth for the MRI, its where she get the MRI done is responsible for getting the auth. Patient is requesting the MRI be done at [facility].  Order was faxed to [facility]

## 2020-07-02 NOTE — TELEPHONE ENCOUNTER
patient is needing authorization to have her MRI of L knee please call insurance company to have approved

## 2020-07-02 NOTE — TELEPHONE ENCOUNTER
Patient is calling in to see if you can order and MRI for her (L) elbow since she is already going in to have one on her knee. States the radiating pain is getting worse.

## 2020-07-13 ENCOUNTER — HOSPITAL ENCOUNTER (OUTPATIENT)
Dept: MRI IMAGING | Age: 75
Discharge: HOME | End: 2020-07-13
Attending: INTERNAL MEDICINE
Payer: MEDICARE

## 2020-07-13 DIAGNOSIS — M25.562 LEFT KNEE PAIN, UNSPECIFIED CHRONICITY: ICD-10-CM

## 2020-07-13 DIAGNOSIS — M25.462 KNEE EFFUSION, LEFT: ICD-10-CM

## 2020-07-13 PROCEDURE — 73721 MRI JNT OF LWR EXTRE W/O DYE: CPT

## 2020-07-29 ENCOUNTER — HOSPITAL ENCOUNTER (OUTPATIENT)
Dept: MRI IMAGING | Age: 75
Discharge: HOME | End: 2020-07-29
Payer: MEDICARE

## 2020-07-29 VITALS — WEIGHT: 161 LBS | BODY MASS INDEX: 24.48 KG/M2

## 2020-07-29 DIAGNOSIS — Z80.3 FAMILY HISTORY OF BREAST CANCER: ICD-10-CM

## 2020-07-29 DIAGNOSIS — R92.2 BREAST DENSITY: ICD-10-CM

## 2020-07-29 DIAGNOSIS — R92.8 ABNORMAL MAMMOGRAM: ICD-10-CM

## 2020-07-29 LAB — CREAT UR-MCNC: 0.7 MG/DL (ref 0.6–1.3)

## 2020-07-29 PROCEDURE — A9575 INJ GADOTERATE MEGLUMI 0.1ML: HCPCS

## 2020-07-29 PROCEDURE — 77049 MRI BREAST C-+ W/CAD BI: CPT

## 2020-07-29 PROCEDURE — 82565 ASSAY OF CREATININE: CPT

## 2020-07-29 PROCEDURE — 74011250636 HC RX REV CODE- 250/636

## 2020-07-29 RX ORDER — GADOTERATE MEGLUMINE 376.9 MG/ML
15 INJECTION INTRAVENOUS
Status: COMPLETED | OUTPATIENT
Start: 2020-07-29 | End: 2020-07-29

## 2020-07-29 RX ADMIN — GADOTERATE MEGLUMINE 15 ML: 376.9 INJECTION INTRAVENOUS at 11:35

## 2020-08-11 ENCOUNTER — OFFICE VISIT (OUTPATIENT)
Dept: INTERNAL MEDICINE CLINIC | Age: 75
End: 2020-08-11

## 2020-08-11 VITALS
WEIGHT: 179 LBS | HEIGHT: 68 IN | RESPIRATION RATE: 18 BRPM | OXYGEN SATURATION: 96 % | BODY MASS INDEX: 27.13 KG/M2 | TEMPERATURE: 98.6 F | HEART RATE: 76 BPM | SYSTOLIC BLOOD PRESSURE: 143 MMHG | DIASTOLIC BLOOD PRESSURE: 69 MMHG

## 2020-08-11 DIAGNOSIS — R13.10 DYSPHAGIA, UNSPECIFIED TYPE: Primary | ICD-10-CM

## 2020-08-11 DIAGNOSIS — Z76.0 MEDICATION REFILL: ICD-10-CM

## 2020-08-11 DIAGNOSIS — G25.81 RLS (RESTLESS LEGS SYNDROME): ICD-10-CM

## 2020-08-11 DIAGNOSIS — R10.13 EPIGASTRIC PAIN: ICD-10-CM

## 2020-08-11 RX ORDER — PANTOPRAZOLE SODIUM 40 MG/1
40 TABLET, DELAYED RELEASE ORAL DAILY
Qty: 30 TAB | Refills: 5 | Status: SHIPPED | OUTPATIENT
Start: 2020-08-11 | End: 2020-10-28 | Stop reason: SDUPTHER

## 2020-08-11 RX ORDER — ROPINIROLE 0.5 MG/1
0.5 TABLET, FILM COATED ORAL
Qty: 180 TAB | Refills: 5 | Status: SHIPPED | OUTPATIENT
Start: 2020-08-11 | End: 2022-03-14 | Stop reason: SDUPTHER

## 2020-08-11 NOTE — PROGRESS NOTES
HISTORY OF PRESENT ILLNESS  Florencia Kehr is a 76 y.o. female. Visit Vitals  /69 (BP 1 Location: Right arm, BP Patient Position: Sitting)   Pulse 76   Temp 98.6 °F (37 °C) (Oral)   Resp 18   Ht 5' 8\" (1.727 m)   Wt 179 lb (81.2 kg)   SpO2 96%   BMI 27.22 kg/m²       Abdominal Pain   The history is provided by the patient (\"months\" she has had epigastric pain). This is a new problem. The current episode started more than 1 week ago. The problem occurs daily. The problem has not changed since onset. Associated symptoms include abdominal pain. Pertinent negatives include no chest pain and no shortness of breath. Associated symptoms comments: Worse after eating, worse with laying down. After eating she often now has to vomit. . Nothing relieves the symptoms. She has tried nothing for the symptoms. Numbness   The history is provided by the patient (she is describing some burning and neuropathic sx in feet. Slowly getting worse). This is a new problem. The current episode started more than 1 week ago. There was left lower extremity and right lower extremity (feet.) focality noted. Pertinent negatives include no focal weakness. Primary symptoms comment: restless legs. numbness on bottom of feet. Pertinent negatives include no shortness of breath and no chest pain. Review of Systems   Constitutional: Negative for chills and fever. Respiratory: Negative for cough and shortness of breath. Cardiovascular: Negative for chest pain and palpitations. Neurological: Positive for tingling, sensory change and numbness. Negative for focal weakness and weakness. Physical Exam  Vitals signs and nursing note reviewed. Constitutional:       Appearance: Normal appearance. She is well-developed. HENT:      Head: Normocephalic and atraumatic. Cardiovascular:      Rate and Rhythm: Normal rate and regular rhythm.    Pulmonary:      Effort: Pulmonary effort is normal.      Breath sounds: Normal breath sounds. Abdominal:      Comments: Slight epigastric pain with deep palpation to EG junction region   Musculoskeletal:      Comments: feet look fine. Good pulses. Lt touch intact. Skin:     General: Skin is warm and dry. Neurological:      General: No focal deficit present. Mental Status: She is alert and oriented to person, place, and time. Psychiatric:         Mood and Affect: Mood normal.         Behavior: Behavior normal.         ASSESSMENT and PLAN    ICD-10-CM ICD-9-CM    1. Dysphagia, unspecified type  R13.10 787.20 XR UPPER GI SERIES W KUB      pantoprazole (PROTONIX) 40 mg tablet      REFERRAL TO GASTROENTEROLOGY   2. Epigastric pain  R10.13 789.06 XR UPPER GI SERIES W KUB      pantoprazole (PROTONIX) 40 mg tablet      REFERRAL TO GASTROENTEROLOGY   3. RLS (restless legs syndrome)  G25.81 333.94 rOPINIRole (REQUIP) 0.5 mg tablet   4. Medication refill  Z76.0 V68.1 rOPINIRole (REQUIP) 0.5 mg tablet       Restart protonix    UGI    referral to GI    RLS is acting up--may be related to neuropathy sxs. Refill requip (pt is non-compliant.  Had sxs and was given meds 3 years ago but did not follow through)    F/u one month for recheck on abdomen sxs and RLS

## 2020-08-11 NOTE — PROGRESS NOTES
ROOM # [id number] presents today for   Chief Complaint   Patient presents with    Abdominal Pain       Buck[place] Coad preferred language for health care discussion is english/other. Is someone accompanying this pt? no    Is the patient using any DME equipment during [address]?  cane    Depression Screening:  3 most recent PHQ Screens 6/29/2020 6/9/2020 2/4/2019 1/5/2019 12/19/2018 10/3/2018 7/31/2018   PHQ Not Done - - - - - - -   Little interest or pleasure in doing things Not at all Not at all Not at all Not at all Not at all Not at all Not at all   Feeling down, depressed, irritable, or hopeless Not at all Not at all Not at all Not at all Not at all Not at all Not at all   Total Score PHQ 2 0 0 0 0 0 0 0   Trouble falling or staying asleep, or sleeping too much - - - - Not at all - -   Feeling tired or having little energy - - - - Not at all - -   Poor appetite, weight loss, or overeating - - - - Not at all - -   Feeling bad about yourself - or that you are a failure or have let yourself or your family down - - - - Not at all - -   Trouble concentrating on things such as school, work, reading, or watching TV - - - - Not at all - -   Moving or speaking so slowly that other people could have noticed; or the opposite being so fidgety that others notice - - - - Not at all - -   Thoughts of being better off dead, or hurting yourself in some way - - - - Not at all - -   PHQ 9 Score - - - - 0 - -       Learning Assessment:  Learning Assessment 1/22/2015 11/21/2014 4/4/2014   PRIMARY LEARNER Patient Patient Patient   HIGHEST LEVEL OF EDUCATION - PRIMARY LEARNER  - GRADUATED HIGH SCHOOL OR GED -   BARRIERS PRIMARY LEARNER NONE NONE -   PRIMARY LANGUAGE ENGLISH ENGLISH ENGLISH   LEARNER PREFERENCE PRIMARY READING LISTENING DEMONSTRATION     - READING -     - DEMONSTRATION -   ANSWERED BY rob Patient -   RELATIONSHIP SELF SELF -       Abuse Screening:  Abuse Screening Questionnaire 4/22/2019 7/31/2018 7/22/2015   Do you ever feel afraid of your partner? Y N N   Are you in a relationship with someone who physically or mentally threatens you? Y N N   Is it safe for you to go home? Yfn [facility]       Fall Risk  Fall Risk Assessment, last 12 mths 6/9/2020 4/22/2019 2/4/2019 1/5/2019 12/19/2018 10/3/2018 7/31/2018   Able to walk? Yes Yes Yes Yes Yes Yes Yes   Fall in past 12 months? No No No No No No No           Health Maintenance reviewed and discussed per provider. Yes    Holli Vicente is due for   Health Maintenance Due   Topic Date Due    GLAUCOMA SCREENING Q2Y  02/27/2020    Eye Exam Retinal or Dilated  02/27/2020    Influenza Age 9 to Adult  08/01/2020     Please order/place referral if appropriate. Advance Directive:  1. Do you have an advance directive in place? Patient Reply: no    2. If not, would you like material regarding how to put one in place? Patient Reply: no    Coordination of Care:  1. Have you been to the ER, urgent care clinic since your last visit? Hospitalized since your last visit? yes    2. Have you seen or consulted any other health care providers outside of the [address] [place] [address] since your last visit? Include any pap smears or colon screening.  no

## 2020-08-26 ENCOUNTER — HOSPITAL ENCOUNTER (OUTPATIENT)
Dept: GENERAL RADIOLOGY | Age: 75
Discharge: HOME | End: 2020-08-26
Attending: INTERNAL MEDICINE
Payer: MEDICARE

## 2020-08-26 ENCOUNTER — DOCUMENTATION ONLY (OUTPATIENT)
Dept: INTERNAL MEDICINE CLINIC | Age: 75
End: 2020-08-26

## 2020-08-26 DIAGNOSIS — R13.10 DYSPHAGIA, UNSPECIFIED TYPE: ICD-10-CM

## 2020-08-26 DIAGNOSIS — R10.13 EPIGASTRIC PAIN: ICD-10-CM

## 2020-08-26 PROCEDURE — 74018 RADEX ABDOMEN 1 VIEW: CPT

## 2020-08-26 NOTE — PROGRESS NOTES
Spoke with Mamta Louis in Radiology. Pt had an UGI in February (ordered by Dr. Lucille Newton). For same complaints. It was totally normal. Pt did not inform me of this (if she did, I do not recall that)  I see no indication to repeat this test. OK to cancel order.

## 2020-10-28 ENCOUNTER — OFFICE VISIT (OUTPATIENT)
Dept: INTERNAL MEDICINE CLINIC | Age: 75
End: 2020-10-28
Payer: MEDICARE

## 2020-10-28 ENCOUNTER — HOSPITAL ENCOUNTER (OUTPATIENT)
Dept: LAB | Age: 75
Discharge: HOME | End: 2020-10-28

## 2020-10-28 VITALS
RESPIRATION RATE: 18 BRPM | SYSTOLIC BLOOD PRESSURE: 126 MMHG | HEART RATE: 74 BPM | WEIGHT: 182 LBS | DIASTOLIC BLOOD PRESSURE: 84 MMHG | BODY MASS INDEX: 27.58 KG/M2 | OXYGEN SATURATION: 97 % | TEMPERATURE: 97.1 F | HEIGHT: 68 IN

## 2020-10-28 DIAGNOSIS — R10.13 EPIGASTRIC PAIN: ICD-10-CM

## 2020-10-28 DIAGNOSIS — Z76.0 MEDICATION REFILL: ICD-10-CM

## 2020-10-28 DIAGNOSIS — R35.0 URINARY FREQUENCY: ICD-10-CM

## 2020-10-28 DIAGNOSIS — L98.9 SKIN LESION OF FACE: Primary | ICD-10-CM

## 2020-10-28 DIAGNOSIS — R20.8 BURNING SENSATION OF FEET: ICD-10-CM

## 2020-10-28 DIAGNOSIS — E11.9 TYPE 2 DIABETES MELLITUS WITHOUT COMPLICATION, WITHOUT LONG-TERM CURRENT USE OF INSULIN (HCC): ICD-10-CM

## 2020-10-28 DIAGNOSIS — R13.10 DYSPHAGIA, UNSPECIFIED TYPE: ICD-10-CM

## 2020-10-28 DIAGNOSIS — R07.89 OTHER CHEST PAIN: ICD-10-CM

## 2020-10-28 LAB — XX-LABCORP SPECIMEN COL,LCBCF: NORMAL

## 2020-10-28 PROCEDURE — 99214 OFFICE O/P EST MOD 30 MIN: CPT | Performed by: INTERNAL MEDICINE

## 2020-10-28 PROCEDURE — G8399 PT W/DXA RESULTS DOCUMENT: HCPCS | Performed by: INTERNAL MEDICINE

## 2020-10-28 PROCEDURE — 1101F PT FALLS ASSESS-DOCD LE1/YR: CPT | Performed by: INTERNAL MEDICINE

## 2020-10-28 PROCEDURE — G8427 DOCREV CUR MEDS BY ELIG CLIN: HCPCS | Performed by: INTERNAL MEDICINE

## 2020-10-28 PROCEDURE — 99001 SPECIMEN HANDLING PT-LAB: CPT

## 2020-10-28 PROCEDURE — 1090F PRES/ABSN URINE INCON ASSESS: CPT | Performed by: INTERNAL MEDICINE

## 2020-10-28 PROCEDURE — G8536 NO DOC ELDER MAL SCRN: HCPCS | Performed by: INTERNAL MEDICINE

## 2020-10-28 PROCEDURE — G8419 CALC BMI OUT NRM PARAM NOF/U: HCPCS | Performed by: INTERNAL MEDICINE

## 2020-10-28 PROCEDURE — G9711 PT HX TOT COL OR COLON CA: HCPCS | Performed by: INTERNAL MEDICINE

## 2020-10-28 PROCEDURE — 2022F DILAT RTA XM EVC RTNOPTHY: CPT | Performed by: INTERNAL MEDICINE

## 2020-10-28 PROCEDURE — G8432 DEP SCR NOT DOC, RNG: HCPCS | Performed by: INTERNAL MEDICINE

## 2020-10-28 PROCEDURE — 3044F HG A1C LEVEL LT 7.0%: CPT | Performed by: INTERNAL MEDICINE

## 2020-10-28 RX ORDER — METFORMIN HYDROCHLORIDE 500 MG/1
TABLET, EXTENDED RELEASE ORAL
Qty: 90 TAB | Refills: 5 | Status: SHIPPED | OUTPATIENT
Start: 2020-10-28 | End: 2021-11-24

## 2020-10-28 RX ORDER — PANTOPRAZOLE SODIUM 40 MG/1
40 TABLET, DELAYED RELEASE ORAL DAILY
Qty: 90 TAB | Refills: 4 | Status: SHIPPED | OUTPATIENT
Start: 2020-10-28 | End: 2021-08-03 | Stop reason: ALTCHOICE

## 2020-10-28 RX ORDER — GABAPENTIN 300 MG/1
300 CAPSULE ORAL
Qty: 30 CAP | Refills: 1 | Status: SHIPPED | OUTPATIENT
Start: 2020-10-28 | End: 2021-08-03

## 2020-10-28 NOTE — PROGRESS NOTES
HISTORY OF PRESENT ILLNESS  Jonnie Skinner is a 76 y.o. female. Visit Vitals  /84 (BP 1 Location: Right arm, BP Patient Position: Sitting)   Pulse 74   Temp 97.1 °F (36.2 °C) (Oral)   Resp 18   Ht 5' 8\" (1.727 m)   Wt 182 lb (82.6 kg)   SpO2 97%   BMI 27.67 kg/m²       Still struggling some from orthopedic surgery earlier this month for left knee arthroscopy and medial menisectomy. Skin Problem   The history is provided by the patient (bump on right nasal bridge. Getting worse and is irritated by her glasses. ). This is a new problem. Pertinent negatives include no chest pain, no headaches and no shortness of breath. Indigestion   Pertinent negatives include no chest pain, no headaches and no shortness of breath. Review of Systems   Constitutional: Negative for chills and fever. Respiratory: Negative for cough and shortness of breath. Cardiovascular: Negative for chest pain and palpitations. Genitourinary: Positive for frequency and urgency. Neurological: Negative for dizziness and headaches. Physical Exam  Vitals signs and nursing note reviewed. Constitutional:       Appearance: Normal appearance. She is well-developed. Cardiovascular:      Rate and Rhythm: Normal rate and regular rhythm. Pulmonary:      Effort: Pulmonary effort is normal.      Breath sounds: Normal breath sounds. Musculoskeletal:      Right lower leg: No edema. Left lower leg: No edema. Skin:     General: Skin is warm and dry. Neurological:      General: No focal deficit present. Mental Status: She is alert and oriented to person, place, and time. Psychiatric:         Mood and Affect: Mood normal.         Behavior: Behavior normal.         ASSESSMENT and PLAN    ICD-10-CM ICD-9-CM    1. Skin lesion of face  L98.9 709.9 REFERRAL TO DERMATOLOGY   2. Other chest pain  R07.89 786.59 NUCLEAR CARDIAC STRESS TEST   3. Epigastric pain  R10.13 789.06 pantoprazole (PROTONIX) 40 mg tablet   4. Dysphagia, unspecified type  R13.10 787.20 pantoprazole (PROTONIX) 40 mg tablet   5. Burning sensation of feet  R20.8 782.0 gabapentin (NEURONTIN) 300 mg capsule      VITAMIN B12   6. Urinary frequency  R35.0 788.41 URINALYSIS W/ RFLX MICROSCOPIC   7. Type 2 diabetes mellitus without complication, without long-term current use of insulin ([facility])  A75.0 743.74 METABOLIC PANEL, COMPREHENSIVE      LIPID PANEL      HEMOGLOBIN A1C W/O EAG      MICROALBUMIN, UR, RAND W/ MICROALB/CREAT RATIO      URINALYSIS W/ RFLX MICROSCOPIC   8. Medication refill  Z76.0 V68.1 pantoprazole (PROTONIX) 40 mg tablet      metFORMIN ER (GLUCOPHAGE XR) 500 mg tablet       Skin lesion--refer to derm    Refill as noted    High risk for cardiac disease. Epigastric pain which has begun recently, could be an anginal equivalent  Request stress test    Burning of feet. C/W neuropathy. Add gabapentin at night    Urinary frequency--check urine.  May need referral to urology  Blood sugars have been fine so doubt this is the cause    F/u one month for recheck

## 2020-10-28 NOTE — PROGRESS NOTES
ROOM # 6    Litzy Hung presents today for   Chief Complaint   Patient presents with    Skin Problem     spot on face     [address] preferred language for health care discussion is english/other.     Is someone accompanying this pt? no    Is the patient using any DME equipment during [address]? no    Depression Screening:  3 most recent PHQ Screens 6/29/2020 6/9/2020 2/4/2019 1/5/2019 12/19/2018 10/3/2018 7/31/2018   PHQ Not Done - - - - - - -   Little interest or pleasure in doing things Not at all Not at all Not at all Not at all Not at all Not at all Not at all   Feeling down, depressed, irritable, or hopeless Not at all Not at all Not at all Not at all Not at all Not at all Not at all   Total Score PHQ 2 0 0 0 0 0 0 0   Trouble falling or staying asleep, or sleeping too much - - - - Not at all - -   Feeling tired or having little energy - - - - Not at all - -   Poor appetite, weight loss, or overeating - - - - Not at all - -   Feeling bad about yourself - or that you are a failure or have let yourself or your family down - - - - Not at all - -   Trouble concentrating on things such as school, work, reading, or watching TV - - - - Not at all - -   Moving or speaking so slowly that other people could have noticed; or the opposite being so fidgety that others notice - - - - Not at all - -   Thoughts of being better off dead, or hurting yourself in some way - - - - Not at all - -   PHQ 9 Score - - - - 0 - -       Learning Assessment:  Learning Assessment 1/22/2015 11/21/2014 4/4/2014   PRIMARY LEARNER Patient Patient Patient   HIGHEST LEVEL OF EDUCATION - PRIMARY LEARNER  - GRADUATED HIGH SCHOOL OR GED -   BARRIERS PRIMARY LEARNER NONE NONE -   PRIMARY LANGUAGE ENGLISH ENGLISH ENGLISH   LEARNER PREFERENCE PRIMARY READING LISTENING DEMONSTRATION     - READING -     - DEMONSTRATION -   ANSWERED BY rob Patient -   RELATIONSHIP SELF SELF -       Abuse Screening:  Abuse Screening Questionnaire 4/22/2019 7/31/2018 7/22/2015   Do you ever feel afraid of your partner? Y N N   Are you in a relationship with someone who physically or mentally threatens you? Y N N   Is it safe for you to go home? Jasmyn Jones       Fall Risk  Fall Risk Assessment, last 12 mths 6/9/2020 4/22/2019 2/4/2019 1/5/2019 12/19/2018 10/3/2018 7/31/2018   Able to walk? Yes Yes Yes Yes Yes Yes Yes   Fall in past 12 months? No No No No No No No           Health Maintenance reviewed and discussed per provider. Yes    Keyur Roque is due for   Health Maintenance Due   Topic Date Due    Shingrix Vaccine Age 50> (1 of 2) 10/13/1995    GLAUCOMA SCREENING Q2Y  02/27/2020    Eye Exam Retinal or Dilated  02/27/2020    Flu Vaccine (1) 09/01/2020    MICROALBUMIN Q1  10/02/2020     Please order/place referral if appropriate. Advance Directive:  1. Do you have an advance directive in place? Patient Reply: no    2. If not, would you like material regarding how to put one in place? Patient Reply: non    Coordination of Care:  1. Have you been to the ER, urgent care clinic since your last visit? Hospitalized since your last visit? yes    2. Have you seen or consulted any other health care providers outside of the [address] [place] [address] since your last visit? Include any pap smears or colon screening.  ortho    Pt refused flu shot

## 2020-10-29 LAB
ALBUMIN SERPL-MCNC: 4.3 G/DL (ref 3.7–4.7)
ALBUMIN/CREAT UR: 3 MG/G CREAT (ref 0–29)
ALBUMIN/GLOB SERPL: 1.7 {RATIO} (ref 1.2–2.2)
ALP SERPL-CCNC: 66 IU/L (ref 39–117)
ALT SERPL-CCNC: 14 IU/L (ref 0–32)
APPEARANCE UR: CLEAR
AST SERPL-CCNC: 17 IU/L (ref 0–40)
BILIRUB SERPL-MCNC: 0.4 MG/DL (ref 0–1.2)
BILIRUB UR QL STRIP: NEGATIVE
BUN SERPL-MCNC: 10 MG/DL (ref 8–27)
BUN/CREAT SERPL: 14 (ref 12–28)
CALCIUM SERPL-MCNC: 9.5 MG/DL (ref 8.7–10.3)
CHLORIDE SERPL-SCNC: 106 MMOL/L (ref 96–106)
CHOLEST SERPL-MCNC: 180 MG/DL (ref 100–199)
CO2 SERPL-SCNC: 21 MMOL/L (ref 20–29)
COLOR UR: YELLOW
CREAT SERPL-MCNC: 0.72 MG/DL (ref 0.57–1)
CREAT UR-MCNC: 144.8 MG/DL
GLOBULIN SER CALC-MCNC: 2.5 G/DL (ref 1.5–4.5)
GLUCOSE SERPL-MCNC: 145 MG/DL (ref 65–99)
GLUCOSE UR QL: NEGATIVE
HBA1C MFR BLD: 6.3 % (ref 4.8–5.6)
HDLC SERPL-MCNC: 52 MG/DL
HGB UR QL STRIP: NEGATIVE
INTERPRETATION, 910389: NORMAL
KETONES UR QL STRIP: NEGATIVE
LDLC SERPL CALC-MCNC: 89 MG/DL (ref 0–99)
LEUKOCYTE ESTERASE UR QL STRIP: NEGATIVE
MICRO URNS: NORMAL
MICROALBUMIN UR-MCNC: 3.9 UG/ML
NITRITE UR QL STRIP: NEGATIVE
PH UR STRIP: 5 [PH] (ref 5–7.5)
POTASSIUM SERPL-SCNC: 3.9 MMOL/L (ref 3.5–5.2)
PROT SERPL-MCNC: 6.8 G/DL (ref 6–8.5)
PROT UR QL STRIP: NEGATIVE
SODIUM SERPL-SCNC: 141 MMOL/L (ref 134–144)
SP GR UR: 1.02 (ref 1–1.03)
TRIGL SERPL-MCNC: 231 MG/DL (ref 0–149)
UROBILINOGEN UR STRIP-MCNC: 1 MG/DL (ref 0.2–1)
VIT B12 SERPL-MCNC: 622 PG/ML (ref 232–1245)
VLDLC SERPL CALC-MCNC: 39 MG/DL (ref 5–40)

## 2020-11-11 ENCOUNTER — HOSPITAL ENCOUNTER (OUTPATIENT)
Dept: NON INVASIVE DIAGNOSTICS | Age: 75
Discharge: HOME | End: 2020-11-11
Attending: INTERNAL MEDICINE
Payer: MEDICARE

## 2020-11-11 ENCOUNTER — HOSPITAL ENCOUNTER (OUTPATIENT)
Dept: NUCLEAR MEDICINE | Age: 75
Discharge: HOME | End: 2020-11-11
Attending: INTERNAL MEDICINE
Payer: MEDICARE

## 2020-11-11 VITALS
SYSTOLIC BLOOD PRESSURE: 133 MMHG | WEIGHT: 176 LBS | DIASTOLIC BLOOD PRESSURE: 61 MMHG | HEIGHT: 68 IN | BODY MASS INDEX: 26.67 KG/M2

## 2020-11-11 DIAGNOSIS — R07.89 OTHER CHEST PAIN: ICD-10-CM

## 2020-11-11 LAB
STRESS BASELINE HR: 72 BPM
STRESS ESTIMATED WORKLOAD: 3.9 METS
STRESS EXERCISE DUR MIN: NORMAL
STRESS PEAK DIAS BP: 75 MMHG
STRESS PEAK SYS BP: 164 MMHG
STRESS PERCENT HR ACHIEVED: 84 %
STRESS POST PEAK HR: 122 BPM
STRESS RATE PRESSURE PRODUCT: NORMAL BPM*MMHG
STRESS ST DEPRESSION: 0 MM
STRESS ST ELEVATION: 0 MM
STRESS TARGET HR: 145 BPM

## 2020-11-11 PROCEDURE — 74011250636 HC RX REV CODE- 250/636: Performed by: INTERNAL MEDICINE

## 2020-11-11 PROCEDURE — A9500 TC99M SESTAMIBI: HCPCS

## 2020-11-11 PROCEDURE — 93017 CV STRESS TEST TRACING ONLY: CPT

## 2020-11-11 RX ORDER — SODIUM CHLORIDE 0.9 % (FLUSH) 0.9 %
5 SYRINGE (ML) INJECTION
Status: DISPENSED | OUTPATIENT
Start: 2020-11-11 | End: 2020-11-11

## 2020-11-11 RX ADMIN — REGADENOSON 0.4 MG: 0.08 INJECTION, SOLUTION INTRAVENOUS at 11:08

## 2020-11-25 ENCOUNTER — APPOINTMENT (OUTPATIENT)
Dept: PHYSICAL THERAPY | Age: 75
End: 2020-11-25

## 2020-12-17 ENCOUNTER — APPOINTMENT (OUTPATIENT)
Dept: PHYSICAL THERAPY | Age: 75
End: 2020-12-17
Payer: MEDICARE

## 2020-12-22 ENCOUNTER — HOSPITAL ENCOUNTER (OUTPATIENT)
Dept: PHYSICAL THERAPY | Age: 75
Discharge: HOME | End: 2020-12-22
Payer: MEDICARE

## 2020-12-22 PROCEDURE — 97161 PT EVAL LOW COMPLEX 20 MIN: CPT

## 2020-12-22 NOTE — PROGRESS NOTES
PHYSICAL THERAPY - DAILY TREATMENT NOTE    Patient Name: Priya Melton        Date: 2020  : 1945   yes Patient  Verified  Visit #:      10  Insurance: Payor: Lucho Pavon / Plan: [facility] HMO / Product Type: Managed Care Medicare /      In time: 200 Out time: 300   Total Treatment Time: 60     Medicare/BCBS Time Tracking (below)   Total Timed Codes (min):  15 1:1 Treatment Time:  60     TREATMENT AREA =  Left knee pain [M25.562]    SUBJECTIVE  Pain Level (on 0 to 10 scale):  3   10   Medication Changes/New allergies or changes in medical history, any new surgeries or procedures?    no  If yes, update Summary List   Subjective Functional Status/Changes:  []  No changes reported   See Eval for subjective information and c/o. OBJECTIVE  15 NC min Therapeutic Exercise:  [x]  See flow sheet  Kinesiotape- space correction technique applied to the L medial knee . Patient was educated on Kinesiotape precautions, indications, contraindications, and instructed to remove if irritation/rash/redness/pain increases or develops. Patient with verbalized understanding of all. Rationale:      increase ROM and increase strength to improve the patients ability to ambulate and transfer     Billed With/As:   [x] TE   [] TA   [] Neuro   [] Self Care Patient Education: [x] Review HEP    [] Progressed/Changed HEP based on:   [x] positioning   [x] body mechanics   [x] transfers   [x] heat/ice application    [] other:      Other Objective/Functional Measures:    See Eval     Post Treatment Pain Level (on 0 to 10) scale:    10     ASSESSMENT  Assessment/Changes in Function:     See Eval     []  See Progress Note/Recertification   Patient will continue to benefit from skilled PT services to modify and progress therapeutic interventions to attain remaining goals.    Progress toward goals / Updated goals:  Patient left with c/o mild \"catching\" in the left knee upon initial standing and walking at end of evaluation which decreased after 1-2 minutes of ambulation and KT application. Offered patient a SPC to use if needed, but she said she didn't need it. Patient has a SPC at home and was instructed to use if she experienced any pain or catching sensation at home. She was with verbalized understanding. Also instructed patient to ice at home. No pain with any part of evaluation with ROM measurements or strength testing. Patient left in no apparent distress.      PLAN  [x]  Upgrade activities as tolerated yes Continue plan of care   []  Discharge due to :    []  Other:      Therapist: Alex Anton, PT    Date: 12/22/2020 Time: 8:42 AM     Future Appointments   Date Time Provider Aris Hunter   12/22/2020  2:00 PM [address] [facility] [address]
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
## 2020-12-23 ENCOUNTER — OFFICE VISIT (OUTPATIENT)
Dept: INTERNAL MEDICINE CLINIC | Age: 75
End: 2020-12-23
Payer: MEDICARE

## 2020-12-23 ENCOUNTER — HOSPITAL ENCOUNTER (OUTPATIENT)
Dept: LAB | Age: 75
Discharge: HOME | End: 2020-12-23

## 2020-12-23 VITALS
HEIGHT: 68 IN | SYSTOLIC BLOOD PRESSURE: 159 MMHG | HEART RATE: 86 BPM | WEIGHT: 179 LBS | DIASTOLIC BLOOD PRESSURE: 71 MMHG | OXYGEN SATURATION: 97 % | TEMPERATURE: 97.5 F | BODY MASS INDEX: 27.13 KG/M2 | RESPIRATION RATE: 20 BRPM

## 2020-12-23 DIAGNOSIS — E55.9 VITAMIN D DEFICIENCY: ICD-10-CM

## 2020-12-23 DIAGNOSIS — L72.9 SKIN CYST: ICD-10-CM

## 2020-12-23 DIAGNOSIS — L72.9 SKIN CYST: Primary | ICD-10-CM

## 2020-12-23 DIAGNOSIS — M25.50 ARTHRALGIA, UNSPECIFIED JOINT: ICD-10-CM

## 2020-12-23 LAB — XX-LABCORP SPECIMEN COL,LCBCF: NORMAL

## 2020-12-23 PROCEDURE — G8432 DEP SCR NOT DOC, RNG: HCPCS | Performed by: INTERNAL MEDICINE

## 2020-12-23 PROCEDURE — G8399 PT W/DXA RESULTS DOCUMENT: HCPCS | Performed by: INTERNAL MEDICINE

## 2020-12-23 PROCEDURE — G8536 NO DOC ELDER MAL SCRN: HCPCS | Performed by: INTERNAL MEDICINE

## 2020-12-23 PROCEDURE — 99001 SPECIMEN HANDLING PT-LAB: CPT

## 2020-12-23 PROCEDURE — G9711 PT HX TOT COL OR COLON CA: HCPCS | Performed by: INTERNAL MEDICINE

## 2020-12-23 PROCEDURE — G8419 CALC BMI OUT NRM PARAM NOF/U: HCPCS | Performed by: INTERNAL MEDICINE

## 2020-12-23 PROCEDURE — 1101F PT FALLS ASSESS-DOCD LE1/YR: CPT | Performed by: INTERNAL MEDICINE

## 2020-12-23 PROCEDURE — 99213 OFFICE O/P EST LOW 20 MIN: CPT | Performed by: INTERNAL MEDICINE

## 2020-12-23 PROCEDURE — 1090F PRES/ABSN URINE INCON ASSESS: CPT | Performed by: INTERNAL MEDICINE

## 2020-12-23 PROCEDURE — G8427 DOCREV CUR MEDS BY ELIG CLIN: HCPCS | Performed by: INTERNAL MEDICINE

## 2020-12-23 NOTE — PROGRESS NOTES
HISTORY OF PRESENT ILLNESS  Montrell Bartholomew is a 76 y.o. female. Visit Vitals  BP (!) 159/71 (BP 1 Location: Left arm, BP Patient Position: Sitting)   Pulse 86   Temp 97.5 °F (36.4 °C) (Temporal)   Resp 20   Ht 5' 8\" (1.727 m)   Wt 179 lb (81.2 kg)   SpO2 97%   BMI 27.22 kg/m²       Pt noticed a lump 1-2 days ago between her breast near the xiphoid. Felt swollen about the size of a ping pong ball. Seems to be smaller today. Felt it in the shower  Not aware of any bites. Not aware of any trauma like tight clothing. Cyst  The history is provided by the patient. This is a new problem. The current episode started more than 2 days ago. The problem occurs daily. The problem has been gradually improving. Review of Systems   Constitutional: Negative for chills and fever. Physical Exam  Chest:             ASSESSMENT and PLAN    ICD-10-CM ICD-9-CM    1. Skin cyst  L72.9 706.2 CBC WITH AUTOMATED DIFF   2. Arthralgia, unspecified joint  I87.64 731.50 METABOLIC PANEL, BASIC      CBC WITH AUTOMATED DIFF      VITAMIN D, 25 HYDROXY   3. Vitamin D deficiency  E55.9 268.9 VITAMIN D, 25 HYDROXY      Does not feel as if in the breast. Will monitor. Mammogram is up to date and due in February 2021  Recheck 2 weeks. Update lab due to bone pain and joint pain    F/u 2 weeks for recheck on cyst. To ER if gets larger again judy if red and tender. ?  Soft tissue ultrasound of the area

## 2020-12-23 NOTE — PROGRESS NOTES
ROOM # [id number] Sporthold presents today for   Chief Complaint   Patient presents with    Cyst     in between breast        Shayy Jem preferred language for health care discussion is english/other.     Is someone accompanying this pt? no    Is the patient using any DME equipment during [address]? no    Depression Screening:  3 most recent PHQ Screens 6/29/2020 6/9/2020 2/4/2019 1/5/2019 12/19/2018 10/3/2018 7/31/2018   PHQ Not Done - - - - - - -   Little interest or pleasure in doing things Not at all Not at all Not at all Not at all Not at all Not at all Not at all   Feeling down, depressed, irritable, or hopeless Not at all Not at all Not at all Not at all Not at all Not at all Not at all   Total Score PHQ 2 0 0 0 0 0 0 0   Trouble falling or staying asleep, or sleeping too much - - - - Not at all - -   Feeling tired or having little energy - - - - Not at all - -   Poor appetite, weight loss, or overeating - - - - Not at all - -   Feeling bad about yourself - or that you are a failure or have let yourself or your family down - - - - Not at all - -   Trouble concentrating on things such as school, work, reading, or watching TV - - - - Not at all - -   Moving or speaking so slowly that other people could have noticed; or the opposite being so fidgety that others notice - - - - Not at all - -   Thoughts of being better off dead, or hurting yourself in some way - - - - Not at all - -   PHQ 9 Score - - - - 0 - -       Learning Assessment:  Learning Assessment 1/22/2015 11/21/2014 4/4/2014   PRIMARY LEARNER Patient Patient Patient   HIGHEST LEVEL OF EDUCATION - PRIMARY LEARNER  - GRADUATED HIGH SCHOOL OR GED -   BARRIERS PRIMARY LEARNER NONE NONE -   PRIMARY LANGUAGE ENGLISH ENGLISH ENGLISH   LEARNER PREFERENCE PRIMARY READING LISTENING DEMONSTRATION     - READING -     - DEMONSTRATION -   ANSWERED BY rob Patient -   RELATIONSHIP SELF SELF -       Abuse Screening:  Abuse Screening Questionnaire 4/22/2019 7/31/2018 7/22/2015   Do you ever feel afraid of your partner? Y N N   Are you in a relationship with someone who physically or mentally threatens you? Y N N   Is it safe for you to go home? Prashanth Suman       Fall Risk  Fall Risk Assessment, last 12 mths 6/9/2020 4/22/2019 2/4/2019 1/5/2019 12/19/2018 10/3/2018 7/31/2018   Able to walk? Yes Yes Yes Yes Yes Yes Yes   Fall in past 12 months? No No No No No No No           Health Maintenance reviewed and discussed per provider. Yes    Viv Marquez is due for   Health Maintenance Due   Topic Date Due    Shingrix Vaccine Age 50> (1 of 2) 10/13/1995    GLAUCOMA SCREENING Q2Y  02/27/2020    Eye Exam Retinal or Dilated  02/27/2020     Please order/place referral if appropriate. Advance Directive:  1. Do you have an advance directive in place? Patient Reply: no    2. If not, would you like material regarding how to put one in place? Patient Reply: no    Coordination of Care:  1. Have you been to the ER, urgent care clinic since your last visit? Hospitalized since your last visit? no    2. Have you seen or consulted any other health care providers outside of the [address] [place] [address] since your last visit? Include any pap smears or colon screening.  no

## 2020-12-24 LAB
25(OH)D3+25(OH)D2 SERPL-MCNC: 33.7 NG/ML (ref 30–100)
BASOPHILS # BLD AUTO: 0.1 X10E3/UL (ref 0–0.2)
BASOPHILS NFR BLD AUTO: 1 %
BUN SERPL-MCNC: 11 MG/DL (ref 8–27)
BUN/CREAT SERPL: 17 (ref 12–28)
CALCIUM SERPL-MCNC: 9.6 MG/DL (ref 8.7–10.3)
CHLORIDE SERPL-SCNC: 103 MMOL/L (ref 96–106)
CO2 SERPL-SCNC: 23 MMOL/L (ref 20–29)
CREAT SERPL-MCNC: 0.65 MG/DL (ref 0.57–1)
EOSINOPHIL # BLD AUTO: 0.1 X10E3/UL (ref 0–0.4)
EOSINOPHIL NFR BLD AUTO: 2 %
ERYTHROCYTE [DISTWIDTH] IN BLOOD BY AUTOMATED COUNT: 11.9 % (ref 11.7–15.4)
GLUCOSE SERPL-MCNC: 122 MG/DL (ref 65–99)
HCT VFR BLD AUTO: 42.4 % (ref 34–46.6)
HGB BLD-MCNC: 14.4 G/DL (ref 11.1–15.9)
IMM GRANULOCYTES # BLD AUTO: 0 X10E3/UL (ref 0–0.1)
IMM GRANULOCYTES NFR BLD AUTO: 0 %
LYMPHOCYTES # BLD AUTO: 2.6 X10E3/UL (ref 0.7–3.1)
LYMPHOCYTES NFR BLD AUTO: 47 %
MCH RBC QN AUTO: 31.6 PG (ref 26.6–33)
MCHC RBC AUTO-ENTMCNC: 34 G/DL (ref 31.5–35.7)
MCV RBC AUTO: 93 FL (ref 79–97)
MONOCYTES # BLD AUTO: 0.5 X10E3/UL (ref 0.1–0.9)
MONOCYTES NFR BLD AUTO: 8 %
NEUTROPHILS # BLD AUTO: 2.4 X10E3/UL (ref 1.4–7)
NEUTROPHILS NFR BLD AUTO: 42 %
PLATELET # BLD AUTO: 216 X10E3/UL (ref 150–450)
POTASSIUM SERPL-SCNC: 3.8 MMOL/L (ref 3.5–5.2)
RBC # BLD AUTO: 4.56 X10E6/UL (ref 3.77–5.28)
SODIUM SERPL-SCNC: 140 MMOL/L (ref 134–144)
WBC # BLD AUTO: 5.7 X10E3/UL (ref 3.4–10.8)

## 2020-12-31 ENCOUNTER — HOSPITAL ENCOUNTER (OUTPATIENT)
Dept: PHYSICAL THERAPY | Age: 75
Discharge: HOME | End: 2020-12-31
Payer: MEDICARE

## 2020-12-31 PROCEDURE — 97535 SELF CARE MNGMENT TRAINING: CPT

## 2020-12-31 PROCEDURE — 97110 THERAPEUTIC EXERCISES: CPT

## 2020-12-31 NOTE — PROGRESS NOTES
PHYSICAL THERAPY - DAILY TREATMENT NOTE    Patient Name: Zully Chowdhury        Date: 2020  : 1945   yes Patient  Verified  Visit #:   2   of   10  Insurance: Payor: BLUE CROSS MEDICARE / Plan: [facility]  HMO / Product Type: Managed Care Medicare /      In time: 105 pm Out time: 146 pm   Total Treatment Time: 41     Medicare/BCBS Time Tracking (below)   Total Timed Codes (min):  41 1:1 Treatment Time:  41     TREATMENT AREA =  Left knee pain [M25.562]    SUBJECTIVE  Pain Level (on 0 to 10 scale):  4-5 / 10   Medication Changes/New allergies or changes in medical history, any new surgeries or procedures?    no  If yes, update Summary List   Subjective Functional Status/Changes:  []  No changes reported     \"I feel stiff today. \"  Pt reports she did not get relief at work the other day and that is why she missed her appt. OBJECTIVE    33 min Therapeutic Exercise:  [x]  See flow sheet   Rationale:      increase ROM and increase strength to improve the patient's ability to perform LE functional tasks and ADL     8 min Self Care: See pt education   Rationale:    Pt education to improve the patient's ability to perform HEP and adhere to PT POC    Billed With/As:   [] TE   [] TA   [] Neuro   [x] Self Care Patient Education: [x] Review HEP    [] Progressed/Changed HEP based on:   [] positioning   [] body mechanics   [] transfers   [] heat/ice application    [] other:     Other Objective/Functional Measures:    Performed exercises per flow sheet     Post Treatment Pain Level (on 0 to 10) scale:   2  / 10     ASSESSMENT  Assessment/Changes in Function:     No adverse effects noted with today's session. Pt reported fatigue after standing approx 5 mins, req'd intermittent rest break. Verbal and tactile cues given t/o all new exercises. HEP issued for appt carry over.      []  See Progress Note/Recertification   Patient will continue to benefit from skilled PT services to modify and progress therapeutic interventions, address functional mobility deficits, address ROM deficits, address strength deficits, analyze and address soft tissue restrictions, analyze and cue movement patterns, analyze and modify body mechanics/ergonomics and assess and modify postural abnormalities to attain remaining goals. Progress toward goals / Updated goals:    · To be accomplished in  2  treatments:  1. Pt will be compliant with HEP for symptom management at home and independent in such for self management at discharge.  Given 12/31/20       PLAN  [x]  Upgrade activities as tolerated yes Continue plan of care   []  Discharge due to :    []  Other:      Therapist: Martin Leonardo PT    Date: 12/31/2020 Time: 1:07 PM     Future Appointments   Date Time Provider Aris Hunter   1/6/2021  3:45 PM Onelia Sevilla MD [facility]

## 2021-01-05 ENCOUNTER — HOSPITAL ENCOUNTER (OUTPATIENT)
Dept: PHYSICAL THERAPY | Age: 76
Discharge: HOME | End: 2021-01-05
Payer: MEDICARE

## 2021-01-05 PROCEDURE — 97535 SELF CARE MNGMENT TRAINING: CPT

## 2021-01-05 PROCEDURE — 97110 THERAPEUTIC EXERCISES: CPT

## 2021-01-05 NOTE — PROGRESS NOTES
PHYSICAL THERAPY - DAILY TREATMENT NOTE    Patient Name: Lyubov Pappas        Date: 2021  : 1945   yes Patient  Verified  Visit #:   3   of   10  Insurance: Payor: Rigo Last / Plan: [facility] Shakeel Malik HMO / Product Type: Managed Care Medicare /      In time: 305 pm Out time: 349 pm   Total Treatment Time: 44     Medicare/BCBS Time Tracking (below)   Total Timed Codes (min):  44 1:1 Treatment Time:  44     TREATMENT AREA =  Left knee pain [M25.562]    SUBJECTIVE  Pain Level (on 0 to 10 scale):  5-6 / 10   Medication Changes/New allergies or changes in medical history, any new surgeries or procedures?    no  If yes, update Summary List   Subjective Functional Status/Changes:  []  No changes reported     \"I'm sore all over today. It's because of the change in weather. \"       OBJECTIVE    34 min Therapeutic Exercise:  [x]  See flow sheet   Rationale:      increase ROM and increase strength to improve the patient's ability to perform LE functional tasks and ADL     10 min Self Care: See pt education   Rationale:    Pt education to improve the patient's ability to perform HEP and adhere to PT POC    Billed With/As:  [] TE  [] TA  [] Neuro  [x] Self Care Patient Education: [x] Review HEP    [] Progressed/Changed HEP based on:   [x] positioning   [x] body mechanics   [] transfers   [] heat/ice application    [] other:     Other Objective/Functional Measures:    Performed exercises per flow sheet     Post Treatment Pain Level (on 0 to 10) scale:   5.5  / 10     ASSESSMENT  Assessment/Changes in Function:     Improved standing tolerance during parallel bar exercises noted this date. Added bridges for continued glute strengthening.      []  See Progress Note/Recertification   Patient will continue to benefit from skilled PT services to modify and progress therapeutic interventions, address functional mobility deficits, address ROM deficits, address strength deficits, analyze and address soft tissue restrictions, analyze and cue movement patterns, analyze and modify body mechanics/ergonomics and assess and modify postural abnormalities to attain remaining goals. Progress toward goals / Updated goals:    · To be accomplished in  2  treatments:  1. Pt will be compliant with HEP for symptom management at home and independent in such for self management at discharge. Given 12/31/20  · To be accomplished in  10  treatments:  1. Pt will demonstrate an increased FOTO score to 60/100 in order to improve function  2. Pt will demonstrate decreased edema in the left knee to within 1cm of that of the right knee in order to improve mobility and aid in transfers and ambulation. 3. Pt will demonstrate increased AROM of the left knee to >/= 0-120 degrees in order to more easily perform chair transfers and ambulate. 4. Pt will improve her SLS time of LLE to >/= 10sec in order to reduce risk of falls.        PLAN  [x]  Upgrade activities as tolerated yes Continue plan of care   []  Discharge due to :    []  Other:      Therapist: Deryl Hodgkin, PT    Date: 1/5/2021 Time: 3:09 PM     Future Appointments   Date Time Provider Aris Hunter   1/6/2021  3:45 PM Katelin Fernández MD [facility]   1/13/2021  3:00 PM Jenifer Brown PT [facility]   1/18/2021  2:15 PM [facility]o [facility]   1/20/2021  2:15 PM Jenifer Brown PT [facility]   1/25/2021  2:15 PM Jericho [facility]   1/28/2021  1:15 PM Leda Roche

## 2021-01-06 ENCOUNTER — OFFICE VISIT (OUTPATIENT)
Dept: INTERNAL MEDICINE CLINIC | Age: 76
End: 2021-01-06
Payer: MEDICARE

## 2021-01-06 VITALS
TEMPERATURE: 97.3 F | RESPIRATION RATE: 16 BRPM | HEIGHT: 68 IN | OXYGEN SATURATION: 97 % | WEIGHT: 178 LBS | BODY MASS INDEX: 26.98 KG/M2 | DIASTOLIC BLOOD PRESSURE: 82 MMHG | SYSTOLIC BLOOD PRESSURE: 148 MMHG | HEART RATE: 72 BPM

## 2021-01-06 DIAGNOSIS — L72.9 SKIN CYST: Primary | ICD-10-CM

## 2021-01-06 DIAGNOSIS — N60.02 BREAST CYST, LEFT: ICD-10-CM

## 2021-01-06 DIAGNOSIS — L98.9 SKIN LESION: ICD-10-CM

## 2021-01-06 DIAGNOSIS — N64.4 BREAST PAIN, LEFT: ICD-10-CM

## 2021-01-06 PROCEDURE — G8536 NO DOC ELDER MAL SCRN: HCPCS | Performed by: INTERNAL MEDICINE

## 2021-01-06 PROCEDURE — 99213 OFFICE O/P EST LOW 20 MIN: CPT | Performed by: INTERNAL MEDICINE

## 2021-01-06 PROCEDURE — 1101F PT FALLS ASSESS-DOCD LE1/YR: CPT | Performed by: INTERNAL MEDICINE

## 2021-01-06 PROCEDURE — G9711 PT HX TOT COL OR COLON CA: HCPCS | Performed by: INTERNAL MEDICINE

## 2021-01-06 PROCEDURE — G8399 PT W/DXA RESULTS DOCUMENT: HCPCS | Performed by: INTERNAL MEDICINE

## 2021-01-06 PROCEDURE — G8510 SCR DEP NEG, NO PLAN REQD: HCPCS | Performed by: INTERNAL MEDICINE

## 2021-01-06 PROCEDURE — G8419 CALC BMI OUT NRM PARAM NOF/U: HCPCS | Performed by: INTERNAL MEDICINE

## 2021-01-06 PROCEDURE — G8427 DOCREV CUR MEDS BY ELIG CLIN: HCPCS | Performed by: INTERNAL MEDICINE

## 2021-01-06 PROCEDURE — 1090F PRES/ABSN URINE INCON ASSESS: CPT | Performed by: INTERNAL MEDICINE

## 2021-01-06 NOTE — PROGRESS NOTES
HISTORY OF PRESENT ILLNESS  Akosua Alatorre is a 76 y.o. female. BP (!) 148/82 (BP 1 Location: Right arm, BP Patient Position: Sitting)   Pulse 72   Temp 97.3 °F (36.3 °C) (Temporal)   Resp 16   Ht 5' 8\" (1.727 m)   Wt 178 lb (80.7 kg)   SpO2 97%   BMI 27.06 kg/m²   Here to f/u cyst between her breasts. See last note. It has not flared up since last visit. She also reports she is PT for her left knee. 3 visits so far. It had been giving out on her. Breast pain  The history is provided by the patient (see comments). Review of Systems   Constitutional: Negative for chills and fever. Genitourinary:        Left breast discomfort and mass(?) medially   Musculoskeletal: Positive for joint pain (left knee pain). Skin:        Skin lesion on face       Physical Exam  Vitals signs and nursing note reviewed. Constitutional:       Appearance: Normal appearance. She is well-developed. HENT:      Head:     Cardiovascular:      Rate and Rhythm: Normal rate. Pulmonary:      Effort: Pulmonary effort is normal.   Chest:       Skin:     General: Skin is warm and dry. Neurological:      General: No focal deficit present. Mental Status: She is alert and oriented to person, place, and time. Psychiatric:         Mood and Affect: Mood normal.         Behavior: Behavior normal.         ASSESSMENT and PLAN    ICD-10-CM ICD-9-CM    1. Skin cyst  L72.9 706.2    2. Skin lesion  L98.9 709.9 REFERRAL TO DERMATOLOGY   3. Breast cyst, left  N60.02 610.0 JOMAR MAMMO BI DX INCL CAD   4. Breast pain, left  N64.4 611.71 [facility] MAMMO BI DX INCL CAD     Skin cyst--between breasts. Has gotten much smaller. Reassured nothing to do    Skin lesion on face--refer to derm. ?AK. Pt spent a lot of time in the sun in her younger days    Update mammogram due to left breast changes. She had an MRI 7/29/20 of her breasts due to fibrocystic changes (ordered by gen surg) with recommendation for routine mammogram f/u.  That is due in Feb 2021.  But now she has a possible mass vs cyst that is prominent    F/u here prn or Lynsey for annual [address]

## 2021-01-06 NOTE — PROGRESS NOTES
ROOM # 102 [facility] presents today for   Chief Complaint   Patient presents with    Breast pain       Austyn Reed preferred language for health care discussion is english/other.     Is someone accompanying this pt? no    Is the patient using any DME equipment during OV? no    Depression Screening:  3 most recent PHQ Screens 1/6/2021 6/29/2020 6/9/2020 2/4/2019 1/5/2019 12/19/2018 10/3/2018   PHQ Not Done - - - - - - -   Little interest or pleasure in doing things Not at all Not at all Not at all Not at all Not at all Not at all Not at all   Feeling down, depressed, irritable, or hopeless Not at all Not at all Not at all Not at all Not at all Not at all Not at all   Total Score PHQ 2 0 0 0 0 0 0 0   Trouble falling or staying asleep, or sleeping too much - - - - - Not at all -   Feeling tired or having little energy - - - - - Not at all -   Poor appetite, weight loss, or overeating - - - - - Not at all -   Feeling bad about yourself - or that you are a failure or have let yourself or your family down - - - - - Not at all -   Trouble concentrating on things such as school, work, reading, or watching TV - - - - - Not at all -   Moving or speaking so slowly that other people could have noticed; or the opposite being so fidgety that others notice - - - - - Not at all -   Thoughts of being better off dead, or hurting yourself in some way - - - - - Not at all -   PHQ 9 Score - - - - - 0 -       Learning Assessment:  Learning Assessment 1/22/2015 11/21/2014 4/4/2014   PRIMARY LEARNER Patient Patient Patient   HIGHEST LEVEL OF EDUCATION - PRIMARY LEARNER  - GRADUATED HIGH SCHOOL OR GED -   BARRIERS PRIMARY LEARNER NONE NONE -   PRIMARY LANGUAGE ENGLISH ENGLISH ENGLISH   LEARNER PREFERENCE PRIMARY READING LISTENING DEMONSTRATION     - READING -     - DEMONSTRATION -   ANSWERED BY rob Patient -   RELATIONSHIP SELF SELF -       Abuse Screening:  Abuse Screening Questionnaire 4/22/2019 7/31/2018 7/22/2015 Do you ever feel afraid of your partner? Y N N   Are you in a relationship with someone who physically or mentally threatens you? Y N N   Is it safe for you to go home? Helen Nolan       Fall Risk  Fall Risk Assessment, last 12 mths 6/9/2020 4/22/2019 2/4/2019 1/5/2019 12/19/2018 10/3/2018 7/31/2018   Able to walk? Yes Yes Yes Yes Yes Yes Yes   Fall in past 12 months? No No No No No No No           Health Maintenance reviewed and discussed per provider. Yes    Raven Medrano is due for   Health Maintenance Due   Topic Date Due    Shingrix Vaccine Age 50> (1 of 2) 10/13/1995    GLAUCOMA SCREENING Q2Y  02/27/2020    Eye Exam Retinal or Dilated  02/27/2020     Please order/place referral if appropriate. Advance Directive:  1. Do you have an advance directive in place? Patient Reply: no    2. If not, would you like material regarding how to put one in place? Patient Reply: no    Coordination of Care:  1. Have you been to the ER, urgent care clinic since your last visit? Hospitalized since your last visit? no    2. Have you seen or consulted any other health care providers outside of the [address] [place] [address] since your last visit? Include any pap smears or colon screening.  no

## 2021-01-20 ENCOUNTER — HOSPITAL ENCOUNTER (OUTPATIENT)
Dept: PHYSICAL THERAPY | Age: 76
Discharge: HOME | End: 2021-01-20
Payer: MEDICARE

## 2021-01-20 PROCEDURE — 97535 SELF CARE MNGMENT TRAINING: CPT

## 2021-01-20 PROCEDURE — 97110 THERAPEUTIC EXERCISES: CPT

## 2021-01-20 NOTE — PROGRESS NOTES
PHYSICAL THERAPY - DAILY TREATMENT NOTE    Patient Name: Varun Hayes        Date: 2021  : 1945   yes Patient  Verified  Visit #:   (4) 1   of   5  Insurance: Payor: Noa Black / Plan: [facility] HUMA[employer] MEDICARE CHOICE PPO/PFFS / Product Type: Managed Care Medicare /      In time: 215 pm Out time: 307 pm   Total Treatment Time: 52     Medicare/BCBS Time Tracking (below)   Total Timed Codes (min):  42 1:1 Treatment Time:  42     TREATMENT AREA =  Left knee pain [M25.562]    SUBJECTIVE  Pain Level (on 0 to 10 scale):  1-2 / 10   Medication Changes/New allergies or changes in medical history, any new surgeries or procedures?    no  If yes, update Summary List   Subjective Functional Status/Changes:  []  No changes reported     \"My knee hurts when I straighten it while using the Nu[employer]. \"       OBJECTIVE  Modalities Rationale: decrease edema, decrease inflammation and decrease pain to improve patient's ability to perform LE functional tasks and ADL   min [] Estim, type/location:                                     []  att     []  unatt     []  w/US     []  w/ice    []  w/heat    min []  Mechanical Traction: type/lbs                   []  pro   []  sup   []  int   []  cont    []  before manual    []  after manual    min []  Ultrasound, settings/location:      min []  Iontophoresis w/ dexamethasone, location:                                               []  take home patch       []  in clinic   10 min [x]  Ice     []  Heat    location/position: To L knee s/p session in supine H/L    min []  Vasopneumatic Device, press/temp:     min []  Other:    [x] Skin assessment post-treatment (if applicable):    [x]  intact    []  redness- no adverse reaction     []redness  adverse reaction:        29 min Therapeutic Exercise:  [x]  See flow sheet   Rationale:      increase ROM and increase strength to improve the patient's ability to perform LE functional tasks and ADL     5 min Manual Therapy: Patellar mobs all dir   Rationale:      decrease pain, increase ROM and increase tissue extensibility to improve patient's ability to perform LE functional tasks and ADL  The manual therapy interventions were performed at a separate and distinct time from the therapeutic activities interventions. 8 min Self Care: See pt education   Rationale:    Pt education to improve the patient's ability to perform HEP and adhere to PT POC    Billed With/As:  [] TE  [] TA  [] Neuro  [x] Self Care Patient Education: [x] Review HEP    [] Progressed/Changed HEP based on:   [] positioning   [] body mechanics   [] transfers   [x] heat/ice application    [] other:     Other Objective/Functional Measures:    Performed exercises per flow sheet     Post Treatment Pain Level (on 0 to 10) scale:   0  / 10     ASSESSMENT  Assessment/Changes in Function:     Pt tolerated today's session without adverse effects. Pt educated on cold modalities and their role in inflammation and pain ctl. Pt verbalized understanding and consented to CP application s/p exercises. Patellar mobility tolerated without inc in pain. []  See Progress Note/Recertification   Patient will continue to benefit from skilled PT services to modify and progress therapeutic interventions, address functional mobility deficits, address ROM deficits, address strength deficits, analyze and address soft tissue restrictions, analyze and cue movement patterns, analyze and modify body mechanics/ergonomics, assess and modify postural abnormalities, address imbalance/dizziness and instruct in home and community integration to attain remaining goals. Progress toward goals / Updated goals:    · To be accomplished in  2  treatments:  1. Pt will be compliant with HEP for symptom management at home and independent in such for self management at discharge. Pt performing HEP 2-3x/week 1/20/21  · To be accomplished in  10  treatments:  1.  Pt will demonstrate an increased FOTO score to 60/100 in order to improve function  2. Pt will demonstrate decreased edema in the left knee to within 1cm of that of the right knee in order to improve mobility and aid in transfers and ambulation. 3. Pt will demonstrate increased AROM of the left knee to >/= 0-120 degrees in order to more easily perform chair transfers and ambulate. 4. Pt will improve her SLS time of LLE to >/= 10sec in order to reduce risk of falls.        PLAN  [x]  Upgrade activities as tolerated yes Continue plan of care   []  Discharge due to :    []  Other:      Therapist: Cruz Link PT    Date: 2021 Time: 2:22 PM     Future Appointments   Date Time Provider Aris Hunter   2021  2:15 PM Hansel Echavarria [facility]   2021  1:15 PM Bebo Chase

## 2021-01-25 ENCOUNTER — HOSPITAL ENCOUNTER (OUTPATIENT)
Dept: PHYSICAL THERAPY | Age: 76
Discharge: HOME | End: 2021-01-25
Payer: MEDICARE

## 2021-01-25 ENCOUNTER — TELEPHONE (OUTPATIENT)
Dept: INTERNAL MEDICINE CLINIC | Age: 76
End: 2021-01-25

## 2021-01-25 DIAGNOSIS — N60.02 CYST OF BOTH BREASTS: Primary | ICD-10-CM

## 2021-01-25 DIAGNOSIS — N60.01 CYST OF BOTH BREASTS: Primary | ICD-10-CM

## 2021-01-25 PROCEDURE — 97535 SELF CARE MNGMENT TRAINING: CPT

## 2021-01-25 PROCEDURE — 97110 THERAPEUTIC EXERCISES: CPT

## 2021-01-25 NOTE — PROGRESS NOTES
PHYSICAL THERAPY - DAILY TREATMENT NOTE    Patient Name: Martinez Garza        Date: 2021  : 1945   yes Patient  Verified  Visit #:   (5) 2   of   5  Insurance: Payor: Genaro Irwin / Plan: [facility] HUMANA MEDICARE CHOICE PPO/PFFS / Product Type: Managed Care Medicare /      In time: 2:24 Out time: 3:12   Total Treatment Time: 48     Medicare/BCBS Time Tracking (below)   Total Timed Codes (min):  38 1:1 Treatment Time:  38     TREATMENT AREA =  Left knee pain [M25.562]    SUBJECTIVE  Pain Level (on 0 to 10 scale): 3-4 / 10   Medication Changes/New allergies or changes in medical history, any new surgeries or procedures?    no  If yes, update Summary List   Subjective Functional Status/Changes:  []  No changes reported     Patient currently reports more pain along the medial aspect of the L knee because of the weather.    SEE PN       OBJECTIVE  Modalities Rationale: decrease edema, decrease inflammation and decrease pain to improve patient's ability to perform LE functional tasks and ADL   min [] Estim, type/location:                                     []  att     []  unatt     []  w/US     []  w/ice    []  w/heat    min []  Mechanical Traction: type/lbs                   []  pro   []  sup   []  int   []  cont    []  before manual    []  after manual    min []  Ultrasound, settings/location:      min []  Iontophoresis w/ dexamethasone, location:                                               []  take home patch       []  in clinic   10 min [x]  Ice     []  Heat    location/position: To L knee s/p session in supine H/L    min []  Vasopneumatic Device, press/temp:     min []  Other:    [x] Skin assessment post-treatment (if applicable):    [x]  intact    []  redness- no adverse reaction     []redness  adverse reaction:        10 min Therapeutic Exercise:  [x]  See flow sheet   Rationale:      increase ROM and increase strength to improve the patient's ability to perform LE functional tasks and ADL       28 min Self Care: Reassessment. Reviewed HEP. Discussed avoiding pivoting motions to prevent re-injury and exacerbation of symptoms. Rationale:    Pt education to improve the patient's ability to perform HEP and adhere to PT POC    Billed With/As:  [] TE  [] TA  [] Neuro  [x] Self Care Patient Education: [x] Review HEP    [] Progressed/Changed HEP based on:   [] positioning   [] body mechanics   [] transfers   [x] heat/ice application    [] other:     Other Objective/Functional Measures:    SEE PN     Post Treatment Pain Level (on 0 to 10) scale:   0  / 10     ASSESSMENT  Assessment/Changes in Function:     SEE PN     []  See Progress Note/Recertification   Patient will continue to benefit from skilled PT services to modify and progress therapeutic interventions, address functional mobility deficits, address ROM deficits, address strength deficits, analyze and address soft tissue restrictions, analyze and cue movement patterns, analyze and modify body mechanics/ergonomics, assess and modify postural abnormalities, address imbalance/dizziness and instruct in home and community integration to attain remaining goals.    Progress toward goals / Updated goals:    SEE PN     PLAN  [x]  Upgrade activities as tolerated yes Continue plan of care   []  Discharge due to :    []  Other:      Therapist: LARS Guerrero    Date: 1/25/2021 Time: 3:42 PM     Future Appointments   Date Time Provider Aris Hunter   1/25/2021  2:15 PM Guillermina Echavarria [facility]   1/28/2021  1:15 PM Franklin Echavarria [facility]   2/1/2021  3:45 PM Frutoso Michael, PT [facility]   2/2/2021  1:00 PM [facility]   2/3/2021  3:45 PM Frutoso Michael, [address] [facility]

## 2021-01-25 NOTE — PROGRESS NOTES
[address] PHYSICAL THERAPY  [address] [place] [address], [place] [address],[facility], [facility] - Phone: [phone]  Fax: [phone] PHYSICAL THERAPY          Patient Name: Huseyin Del Real : 1945   Treatment/Medical Diagnosis: Left knee pain [M25.562]   Onset Date: Summer 2020    Referral Source: Mercedes Haddad MD [facility]): 20   Prior Hospitalization: See Medical History Provider #: [id number]   Prior Level of Function: I ambulation and no \"catching\" in the knee; able to pivot and turn easier   Comorbidities: DM, OA, left knee surgery summer 2020 (patient unable to recall date, but said 5-6 months ago)   Medications: Verified on Patient Summary List   Visits from [facility]: 4 Missed Visits: 2     Goal/Measure of Progress Goal Met? 1. Pt will be compliant with HEP for symptom management at home and independent in such for self management at discharge. Status at last Eval: n/a Current Status: I and compliant yes     Key Functional Changes/Progress: Overall patient has made good progress with PT interventions for L knee pain (s/p L knee menisectomy Summer 2020). Patient reports 35% overall improvement since beginning PT. In the last 2 weeks, pain has ranged between 0-7/10 with an avg 4/10 pain. Notes elevated pain level when pivoting on the LLE. Notices improvement with weightbearing activities (ie: walking and standing). Able to ambulate community distances safely without SPC. Current objective findings:   · L knee AA/AROM flex 119 deg/105 deg   · L knee ext -2 deg  · L knee circumferential measurements: superior pole 40 cm, mid patellar 39 cm, inferior pole 34 cm  · SLS: R 7 seconds, L 5 seconds. Increase ankle strategy and trunk sway.    · FOTO score 53/100 (increase 6 points since initial eval) and +3 on GROC    Problem List: pain affecting function, decrease ROM, decrease strength, edema affecting function, impaired gait/ balance, decrease ADL/ functional abilitiies, decrease activity tolerance, decrease flexibility/ joint mobility and decrease transfer abilities   Treatment Plan may include any combination of the following: Therapeutic exercise, Therapeutic activities, Neuromuscular re-education, Physical agent/modality, Gait/balance training, Manual therapy, Patient education, Self Care training, Functional mobility training, Home safety training and Stair training  Patient Goal(s) has been updated and includes:      Goals for this certification period include and are to be achieved in   4  weeks:  1) Pt will demonstrate an increased FOTO score to 60/100 in order to improve function  2) Pt will demonstrate decreased edema in the left knee to within 1cm of that of the right knee in order to improve mobility and aid in transfers and ambulation  3) Pt will demonstrate increased AROM of the left knee to >/= 0-120 degrees in order to more easily perform chair transfers and ambulate  4) Pt will improve her SLS time of LLE to >/= 10sec in order to reduce risk of falls    Frequency / Duration:   Patient to be seen   1-2   times per week for   3-4    weeks:    Assessments/Recommendations: Continue seeing pt 1-2x/week for 3-4 more weeks to address impairments, work towards goals, and return to PLOF. Therapist Signature: Santiago Mendoza 38.  Radha Orr DPT Date: 7/56/3004   Certification Period:  Reporting Period: 12/22/20-3/18/21  12/22/20-01/25/20 Time: 4:00 PM   NOTE TO PHYSICIAN:  PLEASE COMPLETE THE ORDERS BELOW AND FAX TO   [facility] Physical Therapy: [phone]  If you are unable to process this request in 24 hours please contact our office: [phone]    ___ I have read the above report and request that my patient continue as recommended.   ___ I have read the above report and request that my patient continue therapy with the following changes/special instructions: ________________________________________________

## 2021-01-28 ENCOUNTER — HOSPITAL ENCOUNTER (OUTPATIENT)
Dept: PHYSICAL THERAPY | Age: 76
Discharge: HOME | End: 2021-01-28
Payer: MEDICARE

## 2021-01-28 PROCEDURE — 97110 THERAPEUTIC EXERCISES: CPT

## 2021-01-28 NOTE — PROGRESS NOTES
PHYSICAL THERAPY - DAILY TREATMENT NOTE    Patient Name: Mohsen Spicer        Date: 2021  : 1945   yes Patient  Verified  Visit #:   (6) 3   of   5  Insurance: Payor: Christoph Amor / Plan: [facility] HUMANA MEDICARE CHOICE PPO/PFFS / Product Type: Managed Care Medicare /      In time: 1:08 Out time: 1:56   Total Treatment Time: 48     Medicare/BC Time Tracking (below)   Total Timed Codes (min):  38 1:1 Treatment Time:  38     TREATMENT AREA =  Left knee pain [M25.562]    SUBJECTIVE  Pain Level (on 0 to 10 scale): 3-4 / 10   Medication Changes/New allergies or changes in medical history, any new surgeries or procedures?    no  If yes, update Summary List   Subjective Functional Status/Changes:  []  No changes reported     Patient reports just feeling achy with no real pain.          OBJECTIVE  Modalities Rationale: decrease edema, decrease inflammation and decrease pain to improve patient's ability to perform LE functional tasks and ADL   min [] Estim, type/location:                                     []  att     []  unatt     []  w/US     []  w/ice    []  w/heat    min []  Mechanical Traction: type/lbs                   []  pro   []  sup   []  int   []  cont    []  before manual    []  after manual    min []  Ultrasound, settings/location:      min []  Iontophoresis w/ dexamethasone, location:                                               []  take home patch       []  in clinic   10 min [x]  Ice     []  Heat    location/position: To L knee s/p session in supine H/L    min []  Vasopneumatic Device, press/temp:     min []  Other:    [x] Skin assessment post-treatment (if applicable):    [x]  intact    []  redness- no adverse reaction     []redness  adverse reaction:        38 min Therapeutic Exercise:  [x]  See flow sheet   Rationale:      increase ROM and increase strength to improve the patient's ability to perform LE functional tasks and ADL     Billed With/As:  [x] TE  [] TA  [] Neuro  [] Self Care Patient Education: [x] Review HEP    [] Progressed/Changed HEP based on:   [] positioning   [] body mechanics   [] transfers   [x] heat/ice application    [] other:     Other Objective/Functional Measures:    *added TKE with ball on wall to increase knee extension ROM and quad strength  *noted medial joint line discomfort throughout standing therex. Cues for tolerable muscle activation and ROM. *patient easily distracted with conversation, required frequent redirection to task. Post Treatment Pain Level (on 0 to 10) scale:   0 / 10     ASSESSMENT  Assessment/Changes in Function:     Patient notes no pain following PT session. []  See Progress Note/Recertification   Patient will continue to benefit from skilled PT services to modify and progress therapeutic interventions, address functional mobility deficits, address ROM deficits, address strength deficits, analyze and address soft tissue restrictions, analyze and cue movement patterns, analyze and modify body mechanics/ergonomics, assess and modify postural abnormalities, address imbalance/dizziness and instruct in home and community integration to attain remaining goals. Progress toward goals / Updated goals:    · Goals for this certification period include and are to be achieved in   4  weeks:  1) Pt will demonstrate an increased FOTO score to 60/100 in order to improve function  2) Pt will demonstrate decreased edema in the left knee to within 1cm of that of the right knee in order to improve mobility and aid in transfers and ambulation  3) Pt will demonstrate increased AROM of the left knee to >/= 0-120 degrees in order to more easily perform chair transfers and ambulate  4) Pt will improve her SLS time of LLE to >/= 10sec in order to reduce risk of falls    No significant progress towards PT goals at this time.      PLAN  [x]  Upgrade activities as tolerated yes Continue plan of care   []  Discharge due to :    []  Other:      Therapist: Dolph Prader, LPTA    Date: 1/28/2021 Time: 2:02 PM     Future Appointments   Date Time Provider Aris Hunter   1/28/2021  1:15 PM Willi Echavarria [facility]   2/1/2021  3:45 PM Sanjana Kaye, PT [facility]   2/2/2021  1:00 PM [facility]   2/2/2021  2:00 PM [facility] RM 1 [facility]   2/3/2021  3:45 PM Sanjana Kaye, PT [facility]

## 2021-02-03 ENCOUNTER — HOSPITAL ENCOUNTER (OUTPATIENT)
Dept: PHYSICAL THERAPY | Age: 76
Discharge: HOME | End: 2021-02-03
Payer: MEDICARE

## 2021-02-03 PROCEDURE — 97535 SELF CARE MNGMENT TRAINING: CPT

## 2021-02-03 PROCEDURE — 97110 THERAPEUTIC EXERCISES: CPT

## 2021-02-03 NOTE — PROGRESS NOTES
PHYSICAL THERAPY - DAILY TREATMENT NOTE    Patient Name: Mary Crocker        Date: 2/3/2021  : 1945   yes Patient  Verified  Visit #:   (7) 2   of   8  Insurance: Payor: Peng Jj / Plan: [facility] HUMANA MEDICARE CHOICE PPO/PFFS / Product Type: Managed Care Medicare /      In time: 345 pm Out time: 434 pm   Total Treatment Time: 49     Medicare/BCBS Time Tracking (below)   Total Timed Codes (min):  39 1:1 Treatment Time:  39     TREATMENT AREA =  Left knee pain [M25.562]    SUBJECTIVE  Pain Level (on 0 to 10 scale):  2 / 10   Medication Changes/New allergies or changes in medical history, any new surgeries or procedures?    no  If yes, update Summary List   Subjective Functional Status/Changes:  []  No changes reported     \"My knee feels tight today. \"       OBJECTIVE  Modalities Rationale: decrease edema, decrease inflammation and decrease pain to improve patient's ability to perform LE functional tasks and ADL   min [] Estim, type/location:                                     []  att     []  unatt     []  w/US     []  w/ice    []  w/heat    min []  Mechanical Traction: type/lbs                   []  pro   []  sup   []  int   []  cont    []  before manual    []  after manual    min []  Ultrasound, settings/location:      min []  Iontophoresis w/ dexamethasone, location:                                               []  take home patch       []  in clinic   10 min [x]  Ice     []  Heat    location/position: To L knee s/p session in supine H/L    min []  Vasopneumatic Device, press/temp:     min []  Other:    [x] Skin assessment post-treatment (if applicable):    [x]  intact    []  redness- no adverse reaction     []redness  adverse reaction:        31 min Therapeutic Exercise:  [x]  See flow sheet   Rationale:      increase ROM and increase strength to improve the patient's ability to perform LE functional tasks and ADL    8 min Self Care: See pt education   Rationale:    Pt education to improve the patient's ability to perform HEP and adhere to PT POC    Billed With/As:  [] TE  [] TA  [] Neuro  [x] Self Care Patient Education: [x] Review HEP    [] Progressed/Changed HEP based on:   [] positioning   [] body mechanics   [] transfers   [x] heat/ice application    [] other:     Other Objective/Functional Measures:    Performed exercises per flow sheet     Post Treatment Pain Level (on 0 to 10) scale:   0  / 10     ASSESSMENT  Assessment/Changes in Function:     No adverse effects noted with treatment this date. Pt tolerating progressions well, would benefit from inc resistance on NuStep nv (noted in chart). []  See Progress Note/Recertification   Patient will continue to benefit from skilled PT services to modify and progress therapeutic interventions, address functional mobility deficits, address ROM deficits, address strength deficits, analyze and address soft tissue restrictions, analyze and cue movement patterns, analyze and modify body mechanics/ergonomics, assess and modify postural abnormalities, address imbalance/dizziness and instruct in home and community integration to attain remaining goals.    Progress toward goals / Updated goals:    First f/u s/p PN       PLAN  [x]  Upgrade activities as tolerated yes Continue plan of care   []  Discharge due to :    []  Other:      Therapist: Clark Walker PT    Date: 2/3/2021 Time: 3:45 PM     Future Appointments   Date Time Provider Aris Hunter   2/3/2021  3:45 PM Tammy Plummer, PT [facility]   2/15/2021  1:00 PM [facility] JOMAR STEREO BX  1 [facility]   2/15/2021  1:30 PM [address] [place] [address] 1 [facility]

## 2021-02-09 ENCOUNTER — HOSPITAL ENCOUNTER (OUTPATIENT)
Dept: ULTRASOUND IMAGING | Age: 76
Discharge: HOME | End: 2021-02-09
Attending: INTERNAL MEDICINE
Payer: MEDICARE

## 2021-02-09 ENCOUNTER — HOSPITAL ENCOUNTER (OUTPATIENT)
Dept: MAMMOGRAPHY | Age: 76
Discharge: HOME | End: 2021-02-09
Attending: INTERNAL MEDICINE
Payer: MEDICARE

## 2021-02-09 DIAGNOSIS — N60.02 BREAST CYST, LEFT: ICD-10-CM

## 2021-02-09 DIAGNOSIS — N64.4 BREAST PAIN, LEFT: ICD-10-CM

## 2021-02-09 DIAGNOSIS — N60.01 CYST OF BOTH BREASTS: ICD-10-CM

## 2021-02-09 DIAGNOSIS — N60.02 CYST OF BOTH BREASTS: ICD-10-CM

## 2021-02-09 PROCEDURE — 77062 BREAST TOMOSYNTHESIS BI: CPT

## 2021-02-09 PROCEDURE — 76642 ULTRASOUND BREAST LIMITED: CPT

## 2021-03-09 ENCOUNTER — APPOINTMENT (OUTPATIENT)
Dept: PHYSICAL THERAPY | Age: 76
End: 2021-03-09

## 2021-03-10 NOTE — PROGRESS NOTES
[facility] PHYSICAL THERAPY   [facility] [address] [place] [address],[facility], [facility] - Phone: [phone]  Fax: [phone]  DISCHARGE SUMMARY FOR PHYSICAL THERAPY          Patient Name: Gage Hansen : 1945   Treatment/Medical Diagnosis: Left knee pain [M25.562]   Onset Date: Summer 2020    Referral Source: Terri Cuello MD [facility]): 20   Prior Hospitalization: See Medical History Provider #: [id number]   Prior Level of Function: I ambulation and no \"catching\" in the knee; able to pivot and turn easier   Comorbidities: DM, OA, left knee surgery summer 2020 (patient unable to recall date, but said 5-6 months ago)   Medications: Verified on Patient Summary List   Visits from [facility]: 7 Missed Visits: 3     Goal/Measure of Progress Goal Met? 1. Pt will demonstrate an increased FOTO score to 60/100 in order to improve function   Status at Last Eval: 53 Current Status: Unable to formally reassess no   2. Pt will demonstrate decreased edema in the left knee to within 1cm of that of the right knee in order to improve mobility and aid in transfers and ambulation   Status at Last Eval: L knee circumferential measurements: superior pole 40 cm, mid patellar 39 cm, inferior pole 34 cm Current Status: Unable to formally reassess no   3. Pt will demonstrate increased AROM of the left knee to >/= 0-120 degrees in order to more easily perform chair transfers and ambulate   Status at Last Eval: L knee AA/AROM flex 119 deg/105 deg     L knee ext -2 deg Current Status: Unable to formally reassess no   4. Pt will improve her SLS time of LLE to >/= 10sec in order to reduce risk of falls   Status at Last Eval: R 7\" L 5\" Current Status: Unable to formally reassess no     Key Functional Changes/Progress: Pt did not return after 2/3/21 appt despite multiple attempts to schedule. Pt to be DC'd at this time.   Assessments/Recommendations: Discontinue therapy due to lack of attendance or compliance. If you have any questions/comments please contact us directly at [phone]. Thank you for allowing us to assist in the care of your patient.     Therapist Signature: New Rivas PT Date: 3/10/21   Reporting Period: 12/22/20 to 2/3/21 Time: 1:08 PM

## 2021-03-11 ENCOUNTER — APPOINTMENT (OUTPATIENT)
Dept: PHYSICAL THERAPY | Age: 76
End: 2021-03-11

## 2021-04-01 ENCOUNTER — OFFICE VISIT (OUTPATIENT)
Dept: INTERNAL MEDICINE CLINIC | Age: 76
End: 2021-04-01
Payer: MEDICARE

## 2021-04-01 VITALS
HEIGHT: 68 IN | TEMPERATURE: 96.8 F | RESPIRATION RATE: 17 BRPM | SYSTOLIC BLOOD PRESSURE: 142 MMHG | BODY MASS INDEX: 26.7 KG/M2 | DIASTOLIC BLOOD PRESSURE: 74 MMHG | OXYGEN SATURATION: 97 % | HEART RATE: 73 BPM | WEIGHT: 176.2 LBS

## 2021-04-01 DIAGNOSIS — R11.10 NON-INTRACTABLE VOMITING, PRESENCE OF NAUSEA NOT SPECIFIED, UNSPECIFIED VOMITING TYPE: Primary | ICD-10-CM

## 2021-04-01 DIAGNOSIS — R10.13 EPIGASTRIC PAIN: ICD-10-CM

## 2021-04-01 PROCEDURE — G8399 PT W/DXA RESULTS DOCUMENT: HCPCS | Performed by: INTERNAL MEDICINE

## 2021-04-01 PROCEDURE — G8427 DOCREV CUR MEDS BY ELIG CLIN: HCPCS | Performed by: INTERNAL MEDICINE

## 2021-04-01 PROCEDURE — G8419 CALC BMI OUT NRM PARAM NOF/U: HCPCS | Performed by: INTERNAL MEDICINE

## 2021-04-01 PROCEDURE — 99213 OFFICE O/P EST LOW 20 MIN: CPT | Performed by: INTERNAL MEDICINE

## 2021-04-01 PROCEDURE — G8536 NO DOC ELDER MAL SCRN: HCPCS | Performed by: INTERNAL MEDICINE

## 2021-04-01 PROCEDURE — 1101F PT FALLS ASSESS-DOCD LE1/YR: CPT | Performed by: INTERNAL MEDICINE

## 2021-04-01 PROCEDURE — 1090F PRES/ABSN URINE INCON ASSESS: CPT | Performed by: INTERNAL MEDICINE

## 2021-04-01 PROCEDURE — G8510 SCR DEP NEG, NO PLAN REQD: HCPCS | Performed by: INTERNAL MEDICINE

## 2021-04-01 PROCEDURE — G9711 PT HX TOT COL OR COLON CA: HCPCS | Performed by: INTERNAL MEDICINE

## 2021-04-01 NOTE — PROGRESS NOTES
Reviewed record in preparation for visit and have obtained necessary documentation. Karolina Wilder is a 76 y.o.  female presents today for office visit for   Chief Complaint   Patient presents with    Vomiting     for more than one month    Epigastric Pain    Blood sugar problem   . Pt is not fasting. Pt is in Room # 6. Karolina Wilder preferred language for health care discussion is english/other. Is the patient using any DME equipment during OV? NO      1. Have you been to the ER, urgent care clinic since your last visit? Hospitalized since your last visit? No    2. Have you seen or consulted any other health care providers outside of the [address] [place] [address] since your last visit? Include any pap smears or colon screening. No        Requested Prescriptions      No prescriptions requested or ordered in this encounter       Karolina Wilder is due for:   Health Maintenance Due   Topic    COVID-19 Vaccine (1)    Shingrix Vaccine Age 50> (1 of 2)    Eye Exam Retinal or Dilated      Health Maintenance reviewed and discussed per provider  Please order/place referral if appropriate. Advance Directive:  1. Do you have an advance directive in place? Patient Reply: NO    2. If not, would you like material regarding how to put one in place? NO      Patient is accompanied by self.     Learning Assessment:  Learning Assessment 1/22/2015 11/21/2014 4/4/2014   PRIMARY LEARNER Patient Patient Patient   HIGHEST LEVEL OF EDUCATION - PRIMARY LEARNER  - GRADUATED HIGH SCHOOL OR GED -   BARRIERS PRIMARY LEARNER NONE NONE -   PRIMARY LANGUAGE ENGLISH ENGLISH ENGLISH   LEARNER PREFERENCE PRIMARY READING LISTENING DEMONSTRATION     - READING -     - DEMONSTRATION -   ANSWERED BY rbo Patient -   RELATIONSHIP SELF SELF -     Depression Screening:  3 most recent [facility] Screens 4/1/2021 1/6/2021 6/29/2020 6/9/2020 2/4/2019 1/5/2019 12/19/2018   PHQ Not Done - - - - - - -   Little interest or pleasure in doing things Not at all Not at all Not at all Not at all Not at all Not at all Not at all   Feeling down, depressed, irritable, or hopeless Not at all Not at all Not at all Not at all Not at all Not at all Not at all   Total Score PHQ 2 0 0 0 0 0 0 0   Trouble falling or staying asleep, or sleeping too much - - - - - - Not at all   Feeling tired or having little energy - - - - - - Not at all   Poor appetite, weight loss, or overeating - - - - - - Not at all   Feeling bad about yourself - or that you are a failure or have let yourself or your family down - - - - - - Not at all   Trouble concentrating on things such as school, work, reading, or watching TV - - - - - - Not at all   Moving or speaking so slowly that other people could have noticed; or the opposite being so fidgety that others notice - - - - - - Not at all   Thoughts of being better off dead, or hurting yourself in some way - - - - - - Not at all   PHQ 9 Score - - - - - - 0     Abuse Screening:  Abuse Screening Questionnaire 4/22/2019 7/31/2018 7/22/2015   Do you ever feel afraid of your partner? Y N N   Are you in a relationship with someone who physically or mentally threatens you? Y N N   Is it safe for you to go home? Melyssa Brown     Fall Risk  Fall Risk Assessment, last 12 mths 4/1/2021 6/9/2020 4/22/2019 2/4/2019 1/5/2019 12/19/2018 10/3/2018   Able to walk? Yes Yes Yes Yes Yes Yes Yes   Fall in past 12 months?  0 No No No No No No     Recent Travel Screening and Travel History documentation     Travel Screening      No screening recorded since 03/31/21 0000      Travel History   Travel since 03/01/21     No documented travel since 03/01/21

## 2021-04-01 NOTE — PROGRESS NOTES
HISTORY OF PRESENT ILLNESS  Vesna Michaud is a 76 y.o. female. BP (!) 142/74 (BP 1 Location: Left upper arm, BP Patient Position: Sitting, BP Cuff Size: Adult)   Pulse 73   Temp 96.8 °F (36 °C) (Temporal)   Resp 17   Ht 5' 8\" (1.727 m)   Wt 176 lb 3.2 oz (79.9 kg)   SpO2 97%   BMI 26.79 kg/m²   In the last month she has had 3 episode of vomiting after water. Not with other beverages and not with food. She tends to drink the eater quickly but not the other. No feeling like it hangs up. No other abdominal pain but has some epigastric fullness. No change in bowel habits. She often has hard stools. Sometimes food \"burns\" when she is eating. Vomiting   The history is provided by the patient (see comments). This is a new problem. The current episode started more than 1 week ago. Associated symptoms include abdominal pain. Pertinent negatives include no chills, no fever and no diarrhea. Review of Systems   Constitutional: Negative for chills and fever. Cardiovascular: Negative for chest pain and palpitations. Gastrointestinal: Positive for abdominal pain, heartburn and vomiting. Negative for blood in stool and diarrhea. Physical Exam  Vitals signs and nursing note reviewed. Constitutional:       Appearance: Normal appearance. She is well-developed. Cardiovascular:      Rate and Rhythm: Normal rate and regular rhythm. Pulmonary:      Effort: Pulmonary effort is normal.      Breath sounds: Normal breath sounds. Abdominal:      General: Abdomen is flat. Palpations: There is no mass. Tenderness: There is abdominal tenderness (mild mid epigastric pain on palpation. ). Hernia: No hernia is present. Skin:     General: Skin is warm and dry. Neurological:      General: No focal deficit present. Mental Status: She is alert and oriented to person, place, and time.    Psychiatric:         Mood and Affect: Mood normal.         Behavior: Behavior normal. ASSESSMENT and PLAN    ICD-10-CM ICD-9-CM    1. Non-intractable vomiting, presence of nausea not specified, unspecified vomiting type  R11.10 787.03 REFERRAL TO GASTROENTEROLOGY      XR UPPER GI AIR CONT WO KUB   2. Epigastric pain  R10.13 789.06 REFERRAL TO GASTROENTEROLOGY      XR UPPER GI AIR CONT WO KUB     Cause of sxs is unclear. Will refer to GI for an evaluation.     Request UGI    F/u after above

## 2021-04-02 ENCOUNTER — TELEPHONE (OUTPATIENT)
Dept: INTERNAL MEDICINE CLINIC | Age: 76
End: 2021-04-02

## 2021-06-16 ENCOUNTER — TELEPHONE (OUTPATIENT)
Dept: INTERNAL MEDICINE CLINIC | Age: 76
End: 2021-06-16

## 2021-06-16 NOTE — TELEPHONE ENCOUNTER
Returned the pt's call, two pt idnetifier's verified. Pt states experiencing tingling/numbnes in toes, increased urination and appetite and a \"bad\" headache which started today. Med recon performed. Pt has not been taking for months per pt due to concerns about the possible side effects. Pt states she did not experience any symptoms with metformin. Pt states she has not been checking her BS. Pt advised to check her BS. Pt states will call back shortly. Will follow.

## 2021-06-16 NOTE — TELEPHONE ENCOUNTER
Spoke with pt in regards to BS readings and HA. Pt states unable to check BS due to problems with meter. Pt states feeling much better and has no complains at this time. Pt is encouraged if symptoms return or worsens to seek medical attention. Pt acknowledges understanding and voices no concerns at this time.

## 2021-06-16 NOTE — TELEPHONE ENCOUNTER
Patient was transferred to the office via PAC. States the patient is having numbness and tingling in her hands as well as shaking of the hands  and abdominal bloating/swelling. I was unable to get a clinical staff member on the phone and advised the patient she may need to go to the an urgent care or the ER. Patient states she wasn't going to go to either with all the sick people and not knowing what anyone has. Advised I would put a message in the sysetem for someone to call her back. Also advised patient that since it is almost 3pm there is a chance this message will not be addressed until tomorrow.

## 2021-06-30 ENCOUNTER — OFFICE VISIT (OUTPATIENT)
Dept: INTERNAL MEDICINE CLINIC | Age: 76
End: 2021-06-30
Payer: MEDICARE

## 2021-06-30 VITALS
HEART RATE: 76 BPM | TEMPERATURE: 97 F | DIASTOLIC BLOOD PRESSURE: 79 MMHG | WEIGHT: 174 LBS | OXYGEN SATURATION: 95 % | HEIGHT: 68 IN | RESPIRATION RATE: 17 BRPM | BODY MASS INDEX: 26.37 KG/M2 | SYSTOLIC BLOOD PRESSURE: 131 MMHG

## 2021-06-30 DIAGNOSIS — N64.4 BREAST PAIN IN FEMALE: ICD-10-CM

## 2021-06-30 DIAGNOSIS — R14.0 ABDOMINAL BLOATING: ICD-10-CM

## 2021-06-30 DIAGNOSIS — N60.02 BREAST CYST, LEFT: ICD-10-CM

## 2021-06-30 DIAGNOSIS — R10.84 GENERALIZED ABDOMINAL PAIN: Primary | ICD-10-CM

## 2021-06-30 DIAGNOSIS — E11.9 TYPE 2 DIABETES MELLITUS WITHOUT COMPLICATION, WITHOUT LONG-TERM CURRENT USE OF INSULIN (HCC): ICD-10-CM

## 2021-06-30 PROCEDURE — G8510 SCR DEP NEG, NO PLAN REQD: HCPCS | Performed by: INTERNAL MEDICINE

## 2021-06-30 PROCEDURE — G8399 PT W/DXA RESULTS DOCUMENT: HCPCS | Performed by: INTERNAL MEDICINE

## 2021-06-30 PROCEDURE — 99214 OFFICE O/P EST MOD 30 MIN: CPT | Performed by: INTERNAL MEDICINE

## 2021-06-30 PROCEDURE — 1101F PT FALLS ASSESS-DOCD LE1/YR: CPT | Performed by: INTERNAL MEDICINE

## 2021-06-30 PROCEDURE — G8427 DOCREV CUR MEDS BY ELIG CLIN: HCPCS | Performed by: INTERNAL MEDICINE

## 2021-06-30 PROCEDURE — 1090F PRES/ABSN URINE INCON ASSESS: CPT | Performed by: INTERNAL MEDICINE

## 2021-06-30 PROCEDURE — G8419 CALC BMI OUT NRM PARAM NOF/U: HCPCS | Performed by: INTERNAL MEDICINE

## 2021-06-30 PROCEDURE — 3046F HEMOGLOBIN A1C LEVEL >9.0%: CPT | Performed by: INTERNAL MEDICINE

## 2021-06-30 PROCEDURE — G9711 PT HX TOT COL OR COLON CA: HCPCS | Performed by: INTERNAL MEDICINE

## 2021-06-30 PROCEDURE — G8536 NO DOC ELDER MAL SCRN: HCPCS | Performed by: INTERNAL MEDICINE

## 2021-06-30 PROCEDURE — 2022F DILAT RTA XM EVC RTNOPTHY: CPT | Performed by: INTERNAL MEDICINE

## 2021-06-30 NOTE — PROGRESS NOTES
HISTORY OF PRESENT ILLNESS  Rosa Salas is a 76 y.o. female. /79 (BP 1 Location: Right arm, BP Patient Position: Sitting, BP Cuff Size: Adult)   Pulse 76   Temp 97 °F (36.1 °C) (Temporal)   Resp 17   Ht 5' 8\" (1.727 m)   Wt 174 lb (78.9 kg)   SpO2 95%   BMI 26.46 kg/m²     I sent a referral to GI in April  She says she did not hear from them  She c/o burning and bloating and discomfort when she swallows    GI Problem  The history is provided by the patient. This is a chronic problem. The current episode started more than 1 week ago. The problem occurs daily. The problem has not changed since onset. Associated symptoms include abdominal pain and headaches. Review of Systems   Constitutional: Negative for chills and weight loss. Gastrointestinal: Positive for abdominal pain and heartburn. Negative for nausea and vomiting. Irregular bowels--sometimes loose, sometime hard  Bloating is also a problem. Sometimes burning in her chest     Genitourinary:        Left breast pain   Musculoskeletal: Positive for myalgias. Neurological: Positive for headaches. Physical Exam  Vitals and nursing note reviewed. Constitutional:       Appearance: Normal appearance. She is well-developed. HENT:      Head: Normocephalic and atraumatic. Cardiovascular:      Rate and Rhythm: Normal rate and regular rhythm. Pulmonary:      Effort: Pulmonary effort is normal.      Breath sounds: Normal breath sounds. Abdominal:      General: Abdomen is flat. There is distension. Palpations: Abdomen is soft. There is no mass. Tenderness: There is no abdominal tenderness. There is no guarding or rebound. Musculoskeletal:      Right lower leg: No edema. Left lower leg: No edema. Skin:     General: Skin is warm and dry. Neurological:      General: No focal deficit present. Mental Status: She is alert and oriented to person, place, and time.    Psychiatric:         Mood and Affect: Mood normal.         Behavior: Behavior normal.         ASSESSMENT and PLAN    ICD-10-CM ICD-9-CM    1. Generalized abdominal pain  R10.84 789.07 CT ABD PELV W WO CONT      CBC WITH AUTOMATED DIFF      URINALYSIS W/ RFLX MICROSCOPIC      CELIAC ANTIBODY PROFILE      LIPASE   2. Type 2 diabetes mellitus without complication, without long-term current use of insulin (HCC)  Q34.2 625.76 METABOLIC PANEL, COMPREHENSIVE      LIPID PANEL      HEMOGLOBIN A1C W/O EAG      MICROALBUMIN, UR, RAND W/ MICROALB/CREAT RATIO   3. Abdominal bloating  R14.0 787.3 CT ABD PELV W WO CONT      URINALYSIS W/ RFLX MICROSCOPIC      CELIAC ANTIBODY PROFILE      LIPASE   4. Breast pain in female  N64.4 611.71 REFERRAL TO GENERAL SURGERY   5. Breast cyst, left  N60.02 610.0 REFERRAL TO GENERAL SURGERY       Will f/u with the GI referral--copy given to pt    Update lab today    Order CT abdomen for persistent sxs of unknown cause    Refer to surgery re left breast pain. Mammogram 4 months ago was OK    Discussed COVID vax--pt declines.  She has heard \"too many bad things\"    F/u here one month for MWV and to f/u test results

## 2021-06-30 NOTE — PROGRESS NOTES
Reviewed record in preparation for visit and have obtained necessary documentation. Pavel Bustillos is a 76 y.o.  female presents today for office visit for   Chief Complaint   Patient presents with    GI Problem     indigestion, constipation, abdominal discomfort    Head Pain     left    . Pt is not fasting. Patient is accompanied by self. I have received verbal consent from Pavel Bustillos to discuss any/all medical information while they are present in the room. Pt is in Room # [address] preferred language for health care discussion is english/other. Is the patient using any DME equipment during OV? NO    Advance Directive:  1. Do you have an advance directive in place? Patient Reply: NO    2. If not, would you like material regarding how to put one in place? NO    Coordination of Care:  1. Have you been to the ER, urgent care clinic since your last visit? Hospitalized since your last visit? NO    2. Have you seen or consulted any other health care providers outside of the [address] [place] [address] since your last visit? Include any pap smears or colon screening. YES, Neck orthopedic and knee orthopedic 6/2021    Upcoming Appts  Yes Orthopedic 7/2021      Pavel Bustillos is due for:   Health Maintenance Due   Topic    COVID-19 Vaccine (1)    Shingrix Vaccine Age 50> (1 of 2)    Eye Exam Retinal or Dilated     A1C test (Diabetic or Prediabetic)     Foot Exam Q1     Medicare Yearly Exam      Health Maintenance reviewed and discussed per provider  Please order/place referral if appropriate.     Requested Prescriptions      No prescriptions requested or ordered in this encounter       Learning Assessment:  Learning Assessment 1/22/2015 11/21/2014 4/4/2014   PRIMARY LEARNER Patient Patient Patient   HIGHEST LEVEL OF EDUCATION - PRIMARY LEARNER  - GRADUATED HIGH SCHOOL OR GED -   BARRIERS PRIMARY LEARNER NONE NONE -   PRIMARY LANGUAGE ENGLISH ENGLISH ENGLISH   LEARNER PREFERENCE PRIMARY READING LISTENING DEMONSTRATION     - READING -     - DEMONSTRATION -   ANSWERED BY rob Patient -   RELATIONSHIP SELF SELF -     Depression Screening:  3 most recent PHQ Screens 6/30/2021 4/1/2021 1/6/2021 6/29/2020 6/9/2020 2/4/2019 1/5/2019   PHQ Not Done Active Diagnosis of Depression or Bipolar Disorder - - - - - -   Little interest or pleasure in doing things Not at all Not at all Not at all Not at all Not at all Not at all Not at all   Feeling down, depressed, irritable, or hopeless Not at all Not at all Not at all Not at all Not at all Not at all Not at all   Total Score PHQ 2 0 0 0 0 0 0 0   Trouble falling or staying asleep, or sleeping too much - - - - - - -   Feeling tired or having little energy - - - - - - -   Poor appetite, weight loss, or overeating - - - - - - -   Feeling bad about yourself - or that you are a failure or have let yourself or your family down - - - - - - -   Trouble concentrating on things such as school, work, reading, or watching TV - - - - - - -   Moving or speaking so slowly that other people could have noticed; or the opposite being so fidgety that others notice - - - - - - -   Thoughts of being better off dead, or hurting yourself in some way - - - - - - -   PHQ 9 Score - - - - - - -     Abuse Screening:  Abuse Screening Questionnaire 4/22/2019 7/31/2018 7/22/2015   Do you ever feel afraid of your partner? Y N N   Are you in a relationship with someone who physically or mentally threatens you? Y N N   Is it safe for you to go home? Salazar Belt     Fall Risk  Fall Risk Assessment, last 12 mths 6/30/2021 4/1/2021 6/9/2020 4/22/2019 2/4/2019 1/5/2019 12/19/2018   Able to walk? Yes Yes Yes Yes Yes Yes Yes   Fall in past 12 months? 1 0 No No No No No   Do you feel unsteady? 0 - - - - - -   Are you worried about falling 0 - - - - - -   Is TUG test greater than 12 seconds?  0 - - - - - -   Is the gait abnormal? 1 - - - - - -   Number of falls in past 12 months 2 - - - - - - Fall with injury?  0 - - - - - -     Recent Travel Screening and Travel History documentation     Travel Screening      No screening recorded since 06/29/21 0000      Travel History   Travel since 05/30/21     No documented travel since 05/30/21

## 2021-07-01 ENCOUNTER — TELEPHONE (OUTPATIENT)
Dept: INTERNAL MEDICINE CLINIC | Age: 76
End: 2021-07-01

## 2021-07-01 NOTE — TELEPHONE ENCOUNTER
This patient was referred for CT and upper GI to [address] [place] [address]. She was called to schedule her tests, but does not want to go to [place] or [place] for them. She wants to go to [place].   Spoke with Trino about this, and she said to send a message to Dr Rhonda Mehta.

## 2021-07-01 NOTE — TELEPHONE ENCOUNTER
As usual, our schedulers did not read the requisition which CLEARLY STATED  To go to [facility].   [facility] has the orders and should call her

## 2021-07-01 NOTE — TELEPHONE ENCOUNTER
Spoke with the pt regarding testing concerns and advised the orders were sent to [facility]. Pt verbally acknowledges understanding and voices no further concerns at this time.

## 2021-07-07 LAB
ALBUMIN SERPL-MCNC: 4.5 G/DL (ref 3.7–4.7)
ALBUMIN/CREAT UR: 5 MG/G CREAT (ref 0–29)
ALBUMIN/GLOB SERPL: 1.7 {RATIO} (ref 1.2–2.2)
ALP SERPL-CCNC: 62 IU/L (ref 48–121)
ALT SERPL-CCNC: 18 IU/L (ref 0–32)
APPEARANCE UR: ABNORMAL
AST SERPL-CCNC: 23 IU/L (ref 0–40)
BASOPHILS # BLD AUTO: 0.1 X10E3/UL (ref 0–0.2)
BASOPHILS NFR BLD AUTO: 1 %
BILIRUB SERPL-MCNC: 0.3 MG/DL (ref 0–1.2)
BILIRUB UR QL STRIP: NEGATIVE
BUN SERPL-MCNC: 14 MG/DL (ref 8–27)
BUN/CREAT SERPL: 18 (ref 12–28)
CALCIUM SERPL-MCNC: 10.1 MG/DL (ref 8.7–10.3)
CHLORIDE SERPL-SCNC: 106 MMOL/L (ref 96–106)
CHOLEST SERPL-MCNC: 206 MG/DL (ref 100–199)
CO2 SERPL-SCNC: 24 MMOL/L (ref 20–29)
COLOR UR: YELLOW
CREAT SERPL-MCNC: 0.76 MG/DL (ref 0.57–1)
CREAT UR-MCNC: 162.3 MG/DL
EOSINOPHIL # BLD AUTO: 0.1 X10E3/UL (ref 0–0.4)
EOSINOPHIL NFR BLD AUTO: 1 %
ERYTHROCYTE [DISTWIDTH] IN BLOOD BY AUTOMATED COUNT: 12.4 % (ref 11.7–15.4)
GLIADIN PEPTIDE IGA SER-ACNC: 4 UNITS (ref 0–19)
GLIADIN PEPTIDE IGG SER-ACNC: 1 UNITS (ref 0–19)
GLOBULIN SER CALC-MCNC: 2.6 G/DL (ref 1.5–4.5)
GLUCOSE SERPL-MCNC: 106 MG/DL (ref 65–99)
GLUCOSE UR QL: NEGATIVE
HBA1C MFR BLD: 6.5 % (ref 4.8–5.6)
HCT VFR BLD AUTO: 43.4 % (ref 34–46.6)
HDLC SERPL-MCNC: 59 MG/DL
HGB BLD-MCNC: 14.8 G/DL (ref 11.1–15.9)
HGB UR QL STRIP: NEGATIVE
IGA SERPL-MCNC: 343 MG/DL (ref 64–422)
IMM GRANULOCYTES # BLD AUTO: 0 X10E3/UL (ref 0–0.1)
IMM GRANULOCYTES NFR BLD AUTO: 0 %
IMP & REVIEW OF LAB RESULTS: NORMAL
KETONES UR QL STRIP: NEGATIVE
LDLC SERPL CALC-MCNC: 124 MG/DL (ref 0–99)
LEUKOCYTE ESTERASE UR QL STRIP: NEGATIVE
LIPASE SERPL-CCNC: 42 U/L (ref 14–85)
LYMPHOCYTES # BLD AUTO: 2.4 X10E3/UL (ref 0.7–3.1)
LYMPHOCYTES NFR BLD AUTO: 39 %
MCH RBC QN AUTO: 32.3 PG (ref 26.6–33)
MCHC RBC AUTO-ENTMCNC: 34.1 G/DL (ref 31.5–35.7)
MCV RBC AUTO: 95 FL (ref 79–97)
MICRO URNS: ABNORMAL
MICROALBUMIN UR-MCNC: 7.8 UG/ML
MONOCYTES # BLD AUTO: 0.6 X10E3/UL (ref 0.1–0.9)
MONOCYTES NFR BLD AUTO: 9 %
NEUTROPHILS # BLD AUTO: 3.2 X10E3/UL (ref 1.4–7)
NEUTROPHILS NFR BLD AUTO: 50 %
NITRITE UR QL STRIP: NEGATIVE
PH UR STRIP: 5 [PH] (ref 5–7.5)
PLATELET # BLD AUTO: 220 X10E3/UL (ref 150–450)
POTASSIUM SERPL-SCNC: 4.5 MMOL/L (ref 3.5–5.2)
PROT SERPL-MCNC: 7.1 G/DL (ref 6–8.5)
PROT UR QL STRIP: NEGATIVE
RBC # BLD AUTO: 4.58 X10E6/UL (ref 3.77–5.28)
SODIUM SERPL-SCNC: 144 MMOL/L (ref 134–144)
SP GR UR: 1.03 (ref 1–1.03)
TRIGL SERPL-MCNC: 133 MG/DL (ref 0–149)
TTG IGA SER-ACNC: <2 U/ML (ref 0–3)
TTG IGG SER-ACNC: <2 U/ML (ref 0–5)
UROBILINOGEN UR STRIP-MCNC: 0.2 MG/DL (ref 0.2–1)
VLDLC SERPL CALC-MCNC: 23 MG/DL (ref 5–40)
WBC # BLD AUTO: 6.3 X10E3/UL (ref 3.4–10.8)

## 2021-07-30 ENCOUNTER — TELEPHONE (OUTPATIENT)
Dept: INTERNAL MEDICINE CLINIC | Age: 76
End: 2021-07-30

## 2021-07-30 NOTE — TELEPHONE ENCOUNTER
Received a call from the pt regarding missed appointment and headpain/ numbness. Pt states will reschedule for routine appointment, however pt is c/o pain on the Left top of head, numbness and tingling to both feet. Pt is refusing to go to the ED. Pt has not been monitoring BS glucometer and unable to locate it at this time. Pt states she has not taken medications in days. Pt states she has only taken 2 aspirin in the last couple of days. Notified PCP and advised pt needs to schedule an appointment for further evaluation and if symptoms worsens to seek medical attention. Pt made aware. Pt is scheduled for 8/3/21.

## 2021-07-30 NOTE — TELEPHONE ENCOUNTER
Noted.   She has had multiple complaints over time that are non-specific. If sxs do worsen, as advised she should go to the ER.

## 2021-08-03 ENCOUNTER — OFFICE VISIT (OUTPATIENT)
Dept: INTERNAL MEDICINE CLINIC | Age: 76
End: 2021-08-03
Payer: MEDICARE

## 2021-08-03 VITALS
SYSTOLIC BLOOD PRESSURE: 131 MMHG | BODY MASS INDEX: 26.73 KG/M2 | HEIGHT: 68 IN | HEART RATE: 75 BPM | DIASTOLIC BLOOD PRESSURE: 74 MMHG | WEIGHT: 176.4 LBS | RESPIRATION RATE: 17 BRPM | OXYGEN SATURATION: 96 %

## 2021-08-03 DIAGNOSIS — R10.13 EPIGASTRIC PAIN: Primary | ICD-10-CM

## 2021-08-03 DIAGNOSIS — R20.8 BURNING SENSATION OF FEET: ICD-10-CM

## 2021-08-03 DIAGNOSIS — E11.40 TYPE 2 DIABETES MELLITUS WITH DIABETIC NEUROPATHY, WITHOUT LONG-TERM CURRENT USE OF INSULIN (HCC): ICD-10-CM

## 2021-08-03 DIAGNOSIS — G62.9 NEUROPATHY: ICD-10-CM

## 2021-08-03 DIAGNOSIS — E11.9 TYPE 2 DIABETES MELLITUS WITHOUT COMPLICATION, WITHOUT LONG-TERM CURRENT USE OF INSULIN (HCC): ICD-10-CM

## 2021-08-03 PROCEDURE — G8419 CALC BMI OUT NRM PARAM NOF/U: HCPCS | Performed by: INTERNAL MEDICINE

## 2021-08-03 PROCEDURE — 99214 OFFICE O/P EST MOD 30 MIN: CPT | Performed by: INTERNAL MEDICINE

## 2021-08-03 PROCEDURE — 3044F HG A1C LEVEL LT 7.0%: CPT | Performed by: INTERNAL MEDICINE

## 2021-08-03 PROCEDURE — G9711 PT HX TOT COL OR COLON CA: HCPCS | Performed by: INTERNAL MEDICINE

## 2021-08-03 PROCEDURE — G8536 NO DOC ELDER MAL SCRN: HCPCS | Performed by: INTERNAL MEDICINE

## 2021-08-03 PROCEDURE — 2022F DILAT RTA XM EVC RTNOPTHY: CPT | Performed by: INTERNAL MEDICINE

## 2021-08-03 PROCEDURE — G8399 PT W/DXA RESULTS DOCUMENT: HCPCS | Performed by: INTERNAL MEDICINE

## 2021-08-03 PROCEDURE — G8427 DOCREV CUR MEDS BY ELIG CLIN: HCPCS | Performed by: INTERNAL MEDICINE

## 2021-08-03 PROCEDURE — 1090F PRES/ABSN URINE INCON ASSESS: CPT | Performed by: INTERNAL MEDICINE

## 2021-08-03 PROCEDURE — 1101F PT FALLS ASSESS-DOCD LE1/YR: CPT | Performed by: INTERNAL MEDICINE

## 2021-08-03 PROCEDURE — G8510 SCR DEP NEG, NO PLAN REQD: HCPCS | Performed by: INTERNAL MEDICINE

## 2021-08-03 RX ORDER — OMEPRAZOLE 40 MG/1
40 CAPSULE, DELAYED RELEASE ORAL DAILY
Qty: 60 CAPSULE | Refills: 2 | Status: SHIPPED | OUTPATIENT
Start: 2021-08-03 | End: 2021-11-24 | Stop reason: SDUPTHER
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
## 2021-08-11 ENCOUNTER — OFFICE VISIT (OUTPATIENT)
Dept: INTERNAL MEDICINE CLINIC | Age: 76
End: 2021-08-11
Payer: MEDICARE

## 2021-08-11 VITALS
HEART RATE: 75 BPM | OXYGEN SATURATION: 96 % | TEMPERATURE: 97.8 F | DIASTOLIC BLOOD PRESSURE: 73 MMHG | SYSTOLIC BLOOD PRESSURE: 151 MMHG | WEIGHT: 176.8 LBS | BODY MASS INDEX: 26.8 KG/M2 | RESPIRATION RATE: 16 BRPM | HEIGHT: 68 IN

## 2021-08-11 DIAGNOSIS — Z00.00 MEDICARE ANNUAL WELLNESS VISIT, SUBSEQUENT: Primary | ICD-10-CM

## 2021-08-11 DIAGNOSIS — M19.91 PRIMARY OSTEOARTHRITIS, UNSPECIFIED SITE: ICD-10-CM

## 2021-08-11 DIAGNOSIS — E11.40 TYPE 2 DIABETES MELLITUS WITH DIABETIC NEUROPATHY, WITHOUT LONG-TERM CURRENT USE OF INSULIN (HCC): ICD-10-CM

## 2021-08-11 DIAGNOSIS — R20.8 BURNING SENSATION OF FEET: ICD-10-CM

## 2021-08-11 PROCEDURE — 2022F DILAT RTA XM EVC RTNOPTHY: CPT | Performed by: INTERNAL MEDICINE

## 2021-08-11 PROCEDURE — G8399 PT W/DXA RESULTS DOCUMENT: HCPCS | Performed by: INTERNAL MEDICINE

## 2021-08-11 PROCEDURE — 1090F PRES/ABSN URINE INCON ASSESS: CPT | Performed by: INTERNAL MEDICINE

## 2021-08-11 PROCEDURE — G8419 CALC BMI OUT NRM PARAM NOF/U: HCPCS | Performed by: INTERNAL MEDICINE

## 2021-08-11 PROCEDURE — 99213 OFFICE O/P EST LOW 20 MIN: CPT | Performed by: INTERNAL MEDICINE

## 2021-08-11 PROCEDURE — G8427 DOCREV CUR MEDS BY ELIG CLIN: HCPCS | Performed by: INTERNAL MEDICINE

## 2021-08-11 PROCEDURE — G8536 NO DOC ELDER MAL SCRN: HCPCS | Performed by: INTERNAL MEDICINE

## 2021-08-11 PROCEDURE — 3044F HG A1C LEVEL LT 7.0%: CPT | Performed by: INTERNAL MEDICINE

## 2021-08-11 PROCEDURE — G8510 SCR DEP NEG, NO PLAN REQD: HCPCS | Performed by: INTERNAL MEDICINE

## 2021-08-11 PROCEDURE — 1101F PT FALLS ASSESS-DOCD LE1/YR: CPT | Performed by: INTERNAL MEDICINE

## 2021-08-11 PROCEDURE — G0439 PPPS, SUBSEQ VISIT: HCPCS | Performed by: INTERNAL MEDICINE

## 2021-08-11 PROCEDURE — G9711 PT HX TOT COL OR COLON CA: HCPCS | Performed by: INTERNAL MEDICINE

## 2021-08-11 NOTE — PROGRESS NOTES
Reviewed record in preparation for visit and have obtained necessary documentation. Franko Diaz is a 76 y.o.  female presents today for office visit for   Chief Complaint   Patient presents with   [address] [place] [address] Annual Wellness Visit    Results    Tremors   . Pt is not fasting. Patient is accompanied by self. I have received verbal consent from Franko Diaz to discuss any/all medical information while they are present in the room. Pt is in Room # [address] preferred language for health care discussion is english/other. Is the patient using any DME equipment during OV? NO    Advance Directive:  1. Do you have an advance directive in place? Patient Reply: NO    2. If not, would you like material regarding how to put one in place? NO    Coordination of Care:  1. Have you been to the ER, urgent care clinic since your last visit? Hospitalized since your last visit? NO    2. Have you seen or consulted any other health care providers outside of the [address] [place] [address] since your last visit? Include any pap smears or colon screening. NO    Upcoming Appts  No    VORB: No orders of the defined types were placed in this encounter.  Michael Perez MD/Juli Vail LPN    Franko Diaz is due for:   Health Maintenance Due   Topic    COVID-19 Vaccine (1)    Shingrix Vaccine Age 50> (1 of 2)    Eye Exam Retinal or Dilated     Foot Exam Q1     Medicare Yearly Exam      Health Maintenance reviewed and discussed per provider  Please order/place referral if appropriate.     Requested Prescriptions      No prescriptions requested or ordered in this encounter       Learning Assessment:  Learning Assessment 1/22/2015 11/21/2014 4/4/2014   PRIMARY LEARNER Patient Patient Patient   HIGHEST LEVEL OF EDUCATION - PRIMARY LEARNER  - GRADUATED HIGH SCHOOL OR GED -   BARRIERS PRIMARY LEARNER NONE NONE -   PRIMARY LANGUAGE ENGLISH ENGLISH ENGLISH   LEARNER PREFERENCE PRIMARY READING LISTENING DEMONSTRATION     - READING -     - DEMONSTRATION -   ANSWERED BY rob Patient -   RELATIONSHIP SELF SELF -     Depression Screening:  3 most recent PHQ Screens 8/11/2021 8/3/2021 6/30/2021 4/1/2021 1/6/2021 6/29/2020 6/9/2020   PHQ Not Done Active Diagnosis of Depression or Bipolar Disorder - Active Diagnosis of Depression or Bipolar Disorder - - - -   Little interest or pleasure in doing things Not at all Not at all Not at all Not at all Not at all Not at all Not at all   Feeling down, depressed, irritable, or hopeless Several days Not at all Not at all Not at all Not at all Not at all Not at all   Total Score PHQ 2 1 0 0 0 0 0 0   Trouble falling or staying asleep, or sleeping too much - - - - - - -   Feeling tired or having little energy - - - - - - -   Poor appetite, weight loss, or overeating - - - - - - -   Feeling bad about yourself - or that you are a failure or have let yourself or your family down - - - - - - -   Trouble concentrating on things such as school, work, reading, or watching TV - - - - - - -   Moving or speaking so slowly that other people could have noticed; or the opposite being so fidgety that others notice - - - - - - -   Thoughts of being better off dead, or hurting yourself in some way - - - - - - -   PHQ 9 Score - - - - - - -     Abuse Screening:  Abuse Screening Questionnaire 8/11/2021 4/22/2019 7/31/2018 7/22/2015   Do you ever feel afraid of your partner? N Y N N   Are you in a relationship with someone who physically or mentally threatens you? N Y N N   Is it safe for you to go home? Diane Tellez     Fall Risk  Fall Risk Assessment, last 12 mths 8/11/2021 8/3/2021 6/30/2021 4/1/2021 6/9/2020 4/22/2019 2/4/2019   Able to walk? Yes Yes Yes Yes Yes Yes Yes   Fall in past 12 months? 0 0 1 0 No No No   Do you feel unsteady? 0 0 0 - - - -   Are you worried about falling 0 0 0 - - - -   Is TUG test greater than 12 seconds?  - - 0 - - - -   Is the gait abnormal? - - 1 - - - -   Number of falls in past 12 months - - 2 - - - -   Fall with injury? - - 0 - - - -     Recent Travel Screening and Travel History documentation     Travel Screening     Question   Response    In the last month, have you been in contact with someone who was confirmed or suspected to have Coronavirus / COVID-19? No / Unsure    Have you had a COVID-19 viral test in the last 14 days? No    Do you have any of the following new or worsening symptoms? None of these    Have you traveled internationally or domestically in the last month?   No      Travel History   Travel since 07/11/21     No documented travel since 07/11/21
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
## 2021-08-11 NOTE — PROGRESS NOTES
HISTORY OF PRESENT ILLNESS  Austyn Taylor is a 76 y.o. female. BP (!) 151/73 (BP 1 Location: Right arm, BP Patient Position: Sitting, BP Cuff Size: Adult)   Pulse 75   Temp 97.8 °F (36.6 °C) (Temporal)   Resp 16   Ht 5' 8\" (1.727 m)   Wt 176 lb 12.8 oz (80.2 kg)   SpO2 96%   BMI 26.88 kg/m²     Joint Pain   The history is provided by the patient. This is a chronic problem. The current episode started more than 1 week ago. The problem occurs daily. The problem has not changed since onset. Neurologic Problem  The history is provided by the patient (burning in feet). This is a chronic problem. The current episode started more than 1 week ago. Pertinent negatives include no focal weakness. Pertinent negatives include no shortness of breath, no chest pain and no headaches. Review of Systems   Constitutional: Negative for chills and fever. Respiratory: Negative for shortness of breath. Cardiovascular: Negative for chest pain. Musculoskeletal: Positive for joint pain. Neurological: Positive for sensory change. Negative for dizziness, focal weakness and headaches. Physical Exam  Vitals and nursing note reviewed. Constitutional:       Appearance: Normal appearance. She is well-developed. HENT:      Head: Normocephalic and atraumatic. Cardiovascular:      Rate and Rhythm: Normal rate and regular rhythm. Pulmonary:      Effort: Pulmonary effort is normal.      Breath sounds: Normal breath sounds. Musculoskeletal:      Right lower leg: No edema. Left lower leg: No edema. Skin:     General: Skin is warm and dry. Neurological:      General: No focal deficit present. Mental Status: She is alert and oriented to person, place, and time. Comments: Diabetic foot exam:     Left Foot:   Visual Exam: high arch.  Pressure points under metatarsals   Pulse DP: 2+ (normal)   Filament test: normal sensation    Vibratory sensation: diminished      Right Foot:   Visual Exam: high arch, pressure points under metatarsals   Pulse DP: 2+ (normal)   Filament test: normal sensation    Vibratory sensation: diminished     Psychiatric:         Mood and Affect: Mood normal.         Behavior: Behavior normal.         ASSESSMENT and PLAN    ICD-10-CM ICD-9-CM           2. Burning sensation of feet  R20.8 782.0    3. Primary osteoarthritis, unspecified site  M19.91 715.10    4. Type 2 diabetes mellitus with diabetic neuropathy, without long-term current use of insulin ([facility])  E11.40 250.60  DIABETES FOOT EXAM     357.2        By sxs, she has neuropathy. She did not get to neurology last year (Due to [employer]?)  Will refer to another office    Reviewed recent lab--none needed today.  DM controlled    Osteoarthritis--tylenol and/or NSAIDs    Discussed COVID vax--she declines    F/u 2 months for HTN, DM

## 2021-08-11 NOTE — PATIENT INSTRUCTIONS

## 2021-08-31 ENCOUNTER — OFFICE VISIT (OUTPATIENT)
Dept: INTERNAL MEDICINE CLINIC | Age: 76
End: 2021-08-31
Payer: MEDICARE

## 2021-08-31 VITALS
RESPIRATION RATE: 18 BRPM | HEART RATE: 67 BPM | OXYGEN SATURATION: 94 % | WEIGHT: 175 LBS | DIASTOLIC BLOOD PRESSURE: 82 MMHG | TEMPERATURE: 96.9 F | HEIGHT: 68 IN | SYSTOLIC BLOOD PRESSURE: 148 MMHG | BODY MASS INDEX: 26.52 KG/M2

## 2021-08-31 DIAGNOSIS — R20.2 PARESTHESIA: ICD-10-CM

## 2021-08-31 DIAGNOSIS — R42 DIZZINESS: Primary | ICD-10-CM

## 2021-08-31 DIAGNOSIS — H57.89 BURNING SENSATION OF EYE: ICD-10-CM

## 2021-08-31 PROCEDURE — G8510 SCR DEP NEG, NO PLAN REQD: HCPCS | Performed by: INTERNAL MEDICINE

## 2021-08-31 PROCEDURE — 1090F PRES/ABSN URINE INCON ASSESS: CPT | Performed by: INTERNAL MEDICINE

## 2021-08-31 PROCEDURE — G8536 NO DOC ELDER MAL SCRN: HCPCS | Performed by: INTERNAL MEDICINE

## 2021-08-31 PROCEDURE — G8427 DOCREV CUR MEDS BY ELIG CLIN: HCPCS | Performed by: INTERNAL MEDICINE

## 2021-08-31 PROCEDURE — G8419 CALC BMI OUT NRM PARAM NOF/U: HCPCS | Performed by: INTERNAL MEDICINE

## 2021-08-31 PROCEDURE — G9711 PT HX TOT COL OR COLON CA: HCPCS | Performed by: INTERNAL MEDICINE

## 2021-08-31 PROCEDURE — G8399 PT W/DXA RESULTS DOCUMENT: HCPCS | Performed by: INTERNAL MEDICINE

## 2021-08-31 PROCEDURE — 1101F PT FALLS ASSESS-DOCD LE1/YR: CPT | Performed by: INTERNAL MEDICINE

## 2021-08-31 PROCEDURE — 99213 OFFICE O/P EST LOW 20 MIN: CPT | Performed by: INTERNAL MEDICINE

## 2021-08-31 NOTE — PROGRESS NOTES
HISTORY OF PRESENT ILLNESS  Pinky Medina is a 76 y.o. female. BP (!) 148/82 (BP 1 Location: Left upper arm, BP Patient Position: Sitting, BP Cuff Size: Adult)   Pulse 67   Temp 96.9 °F (36.1 °C) (Temporal)   Resp 18   Ht 5' 8\" (1.727 m)   Wt 175 lb (79.4 kg)   SpO2 94%   BMI 26.61 kg/m²     when stood up this morning, got very dizzy. It passed quickly. Burning feet. Eyes burn. Used eye drops and it finally resolved    Yesterday she was out in the heat working in the yard. Tried to stay hydrated. Moving flower pots and other law items    Dizziness   The history is provided by the patient. This is a new problem. The current episode started 3 to 5 hours ago. Associated symptoms include nausea (slight tansient nausea) and dizziness. Pertinent negatives include no chest pain, no palpitations, no fever and no congestion. Review of Systems   Constitutional: Negative for chills and fever. HENT: Negative for congestion and sore throat. Respiratory: Negative for shortness of breath. Cardiovascular: Negative for chest pain and palpitations. Gastrointestinal: Positive for nausea (slight tansient nausea). Neurological: Positive for dizziness. Physical Exam  Vitals and nursing note reviewed. Constitutional:       Appearance: Normal appearance. She is well-developed. HENT:      Head: Normocephalic and atraumatic. Right Ear: Tympanic membrane and ear canal normal.      Left Ear: Tympanic membrane and ear canal normal.   Neck:      Vascular: No carotid bruit. Cardiovascular:      Rate and Rhythm: Normal rate and regular rhythm. Pulmonary:      Effort: Pulmonary effort is normal.      Breath sounds: Normal breath sounds. Musculoskeletal:      Right lower leg: No edema. Left lower leg: No edema. Comments: Feet look good. No redness  good DP pulses  No pain on lateral compression. Skin:     General: Skin is warm and dry.    Neurological:      General: No focal deficit present. Mental Status: She is alert and oriented to person, place, and time. Comments: No nystagmus noted on eye movement. Psychiatric:         Mood and Affect: Mood normal.         Behavior: Behavior normal.         ASSESSMENT and PLAN    ICD-10-CM ICD-9-CM    1. Dizziness  R42 780.4    2. Paresthesia  R20.2 782.0    3. Burning sensation of eye  H57.89 379.99        transient dizziness--resolved. ? dehydrated from yesterday in the sun    Transient paresthesias of feet. Eye--likely excessive dryness    Reassured. Stay well hydrated. Watch out for the sun exposure    BP a little high today.   Advised to check blood sugar should she have another event    F/u prn or as appointed

## 2021-08-31 NOTE — PROGRESS NOTES
Leda Stoddard is a 76 y.o. female (: 1945) presenting to address:    Chief Complaint   Patient presents with    Dizziness       Vitals:    21 1307   BP: (!) 148/82   Pulse: 67   Resp: 18   Temp: 96.9 °F (36.1 °C)   TempSrc: Temporal   SpO2: 94%   Weight: 175 lb (79.4 kg)   Height: 5' 8\" (1.727 m)   PainSc:   6   PainLoc: Generalized       Hearing/Vision:   No exam data present    Learning Assessment:     Learning Assessment 2015   PRIMARY LEARNER Patient   HIGHEST LEVEL OF EDUCATION - PRIMARY LEARNER  -   BARRIERS PRIMARY LEARNER NONE   PRIMARY LANGUAGE ENGLISH   LEARNER PREFERENCE PRIMARY READING     -     -   ANSWERED BY pat   RELATIONSHIP SELF     Depression Screening:     3 most recent PHQ Screens 2021   PHQ Not Done -   Little interest or pleasure in doing things Not at all   Feeling down, depressed, irritable, or hopeless Not at all   Total Score PHQ 2 0   Trouble falling or staying asleep, or sleeping too much -   Feeling tired or having little energy -   Poor appetite, weight loss, or overeating -   Feeling bad about yourself - or that you are a failure or have let yourself or your family down -   Trouble concentrating on things such as school, work, reading, or watching TV -   Moving or speaking so slowly that other people could have noticed; or the opposite being so fidgety that others notice -   Thoughts of being better off dead, or hurting yourself in some way -   PHQ 9 Score -     Fall Risk Assessment:     Fall Risk Assessment, last 12 mths 2021   Able to walk? Yes   Fall in past 12 months? 0   Do you feel unsteady? -   Are you worried about falling -   Is TUG test greater than 12 seconds? -   Is the gait abnormal? -   Number of falls in past 12 months -   Fall with injury? -     Abuse Screening:     Abuse Screening Questionnaire 2021   Do you ever feel afraid of your partner? N   Are you in a relationship with someone who physically or mentally threatens you? N   Is it safe for you to go home? Y     Coordination of Care Questionaire:   1. Have you been to the ER, urgent care clinic since your last visit? Hospitalized since your last visit? NO    2. Have you seen or consulted any other health care providers outside of the [address] [place] [address] since your last visit? Include any pap smears or colon screening. NO    Advanced Directive:   1. Do you have an Advanced Directive? NO    2. Would you like information on Advanced Directives?  NO

## 2021-09-09 ENCOUNTER — TELEPHONE (OUTPATIENT)
Dept: INTERNAL MEDICINE CLINIC | Age: 76
End: 2021-09-09

## 2021-09-09 NOTE — TELEPHONE ENCOUNTER
Call received from Aurelio at [facility] requesting clinical notes needed so they can obtain authorization for the CT Abd pelvis w/wo contrast.  Asking that the notes please be faxed to [phone]

## 2021-09-09 NOTE — TELEPHONE ENCOUNTER
Received a martina from the pt re: upcoming appt. Pt receiving a call from a scheduling dept. Pt is unsure of the name of the person. Pt states the personnel informed her they are waiting on orders from the PCP. Janet Records Chart reviewed , pt is scheduled w/ [facility] tomorrow for Rad. Testing. S/w Rufino Gonzales and Danny trujillo Prior Authorization Team needs additional OV notes for submission. Fax sent to [address] [place] [address], Tracking # [id number]. Notified pt/PCP.

## 2021-09-22 ENCOUNTER — DOCUMENTATION ONLY (OUTPATIENT)
Dept: INTERNAL MEDICINE CLINIC | Age: 76
End: 2021-09-22

## 2021-09-22 NOTE — PROGRESS NOTES
FROM [facility]:    CT ABDOMEN/PELVIS W/ CONTRAST    Impression        1. No acute abnormality identified in the abdomen or pelvis. 2. Prior hysterectomy and partial colectomy. 3. Degenerative changes in the spine. Signed By: Edy Hernandez MD on 9/22/2021 9:08 AM  Narrative    EXAM: CT ABDOMEN/PELVIS W/ CONTRAST     CLINICAL INDICATION/HISTORY: R10.84: Abdominal pain, generalized   R14.0: Gastric tympany     COMPARISON: 6/27/2019 CT, retroperitoneal ultrasound 9/1/2020     TECHNIQUE:  CT abdomen and pelvis with IV contrast.   All CT scans at this facility are performed using dose optimization technique as appropriate to the performed examination, to include automated exposure control, adjustment of the mA and/or kV according to patient's size (including appropriate matching for site-specific examinations), or use of an iterative reconstruction technique. FINDINGS:   Lower chest: Mild dependent atelectasis. Liver: Focal fat present along the falciform ligament. Biliary: Negative. Pancreas: Negative. Spleen: Negative. Adrenal glands: Negative. Kidneys: 2.1 cm cyst present along the medial right kidney. Additional 8 mm low-attenuation lesion along the superior pole the right kidney is too small to characterize but likely represents a cyst as seen on prior retroperitoneal ultrasound from 9/1/2020. Stomach, Small Bowel and Colon: Suture material present in the pelvis from prior partial colectomy. Pelvic Organs: Status post hysterectomy. Bladder: Negative. Lymph nodes: No lymphadenopathy. Vessels: Calcifications present in the abdominal aorta and iliac arteries. Peritoneal Spaces: No free fluid or free air. Body wall: Small fat-containing umbilical hernia. Bones: Degenerative changes present in the spine with multilevel degenerative disc space narrowing and facet arthropathy. .  Other Result Text    Arlyne Alpers, MD - 09/22/2021 Formatting of this note might be different from the original.   EXAM: CT ABDOMEN/PELVIS W/ CONTRAST     CLINICAL INDICATION/HISTORY: R10.84: Abdominal pain, generalized   R14.0: Gastric tympany     COMPARISON: 6/27/2019 CT, retroperitoneal ultrasound 9/1/2020     TECHNIQUE:  CT abdomen and pelvis with IV contrast.   All CT scans at this facility are performed using dose optimization technique as appropriate to the performed examination, to include automated exposure control, adjustment of the mA and/or kV according to patient's size (including appropriate matching for site-specific examinations), or use of an iterative reconstruction technique. FINDINGS:   Lower chest: Mild dependent atelectasis. Liver: Focal fat present along the falciform ligament. Biliary: Negative. Pancreas: Negative. Spleen: Negative. Adrenal glands: Negative. Kidneys: 2.1 cm cyst present along the medial right kidney. Additional 8 mm low-attenuation lesion along the superior pole the right kidney is too small to characterize but likely represents a cyst as seen on prior retroperitoneal ultrasound from 9/1/2020. Stomach, Small Bowel and Colon: Suture material present in the pelvis from prior partial colectomy. Pelvic Organs: Status post hysterectomy. Bladder: Negative. Lymph nodes: No lymphadenopathy. Vessels: Calcifications present in the abdominal aorta and iliac arteries. Peritoneal Spaces: No free fluid or free air. Body wall: Small fat-containing umbilical hernia. Bones: Degenerative changes present in the spine with multilevel degenerative disc space narrowing and facet arthropathy. .     IMPRESSION     1. No acute abnormality identified in the abdomen or pelvis. 2. Prior hysterectomy and partial colectomy. 3. Degenerative changes in the spine.      Signed By: Edy Hernandez MD on 9/22/2021 9:08 AM     Date: 09/22/21   Received From: Stephanie1 S. Mariella Herrera     Encounter Summary

## 2021-11-16 ENCOUNTER — TELEPHONE (OUTPATIENT)
Dept: INTERNAL MEDICINE CLINIC | Age: 76
End: 2021-11-16

## 2021-11-16 NOTE — TELEPHONE ENCOUNTER
Please return call to patient she has questions regarding her medications please return call when available

## 2021-11-19 NOTE — TELEPHONE ENCOUNTER
Spoke with pt regarding her concerns. Pt states she is currently not home and could not speak at that time. Will notify.

## 2021-11-24 ENCOUNTER — OFFICE VISIT (OUTPATIENT)
Dept: INTERNAL MEDICINE CLINIC | Age: 76
End: 2021-11-24
Payer: MEDICARE

## 2021-11-24 ENCOUNTER — PATIENT MESSAGE (OUTPATIENT)
Dept: INTERNAL MEDICINE CLINIC | Age: 76
End: 2021-11-24

## 2021-11-24 VITALS
BODY MASS INDEX: 26.14 KG/M2 | WEIGHT: 172.5 LBS | SYSTOLIC BLOOD PRESSURE: 142 MMHG | DIASTOLIC BLOOD PRESSURE: 67 MMHG | HEART RATE: 78 BPM | HEIGHT: 68 IN | RESPIRATION RATE: 19 BRPM | TEMPERATURE: 97.6 F | OXYGEN SATURATION: 97 %

## 2021-11-24 DIAGNOSIS — E11.40 TYPE 2 DIABETES MELLITUS WITH DIABETIC NEUROPATHY, WITHOUT LONG-TERM CURRENT USE OF INSULIN (HCC): ICD-10-CM

## 2021-11-24 DIAGNOSIS — R10.13 EPIGASTRIC PAIN: ICD-10-CM

## 2021-11-24 DIAGNOSIS — R14.0 ABDOMINAL BLOATING: Primary | ICD-10-CM

## 2021-11-24 DIAGNOSIS — R19.8 IRREGULAR BOWEL HABITS: ICD-10-CM

## 2021-11-24 DIAGNOSIS — E78.5 DYSLIPIDEMIA: ICD-10-CM

## 2021-11-24 DIAGNOSIS — E55.9 VITAMIN D DEFICIENCY: ICD-10-CM

## 2021-11-24 PROCEDURE — G8510 SCR DEP NEG, NO PLAN REQD: HCPCS | Performed by: INTERNAL MEDICINE

## 2021-11-24 PROCEDURE — 99214 OFFICE O/P EST MOD 30 MIN: CPT | Performed by: INTERNAL MEDICINE

## 2021-11-24 PROCEDURE — G8419 CALC BMI OUT NRM PARAM NOF/U: HCPCS | Performed by: INTERNAL MEDICINE

## 2021-11-24 PROCEDURE — G8536 NO DOC ELDER MAL SCRN: HCPCS | Performed by: INTERNAL MEDICINE

## 2021-11-24 PROCEDURE — 1101F PT FALLS ASSESS-DOCD LE1/YR: CPT | Performed by: INTERNAL MEDICINE

## 2021-11-24 PROCEDURE — G8427 DOCREV CUR MEDS BY ELIG CLIN: HCPCS | Performed by: INTERNAL MEDICINE

## 2021-11-24 PROCEDURE — G8399 PT W/DXA RESULTS DOCUMENT: HCPCS | Performed by: INTERNAL MEDICINE

## 2021-11-24 PROCEDURE — 1090F PRES/ABSN URINE INCON ASSESS: CPT | Performed by: INTERNAL MEDICINE

## 2021-11-24 RX ORDER — OMEPRAZOLE 40 MG/1
40 CAPSULE, DELAYED RELEASE ORAL DAILY
Qty: 60 CAPSULE | Refills: 2 | Status: SHIPPED | OUTPATIENT
Start: 2021-11-24

## 2021-11-24 NOTE — PROGRESS NOTES
Reviewed record in preparation for visit and have obtained necessary documentation. Stephie Mar is a 68 y.o.  female presents today for office visit for   Chief Complaint   Patient presents with    Back Pain    Blood sugar problem    Dysphagia   . Pt is not fasting. Patient is accompanied by self. I have received verbal consent from Stephie Mar to discuss any/all medical information while they are present in the room. Pt is in Room # [address] preferred language for health care discussion is english/other. Is the patient using any DME equipment during OV? YES    Advance Directive:  1. Do you have an advance directive in place? Patient Reply: NO    2. If not, would you like material regarding how to put one in place? NO      1. \"Have you been to the ER, urgent care clinic since your last visit? Hospitalized since your last visit? \" No    2. \"Have you seen or consulted any other health care providers outside of the [address] [place] [address] since your last visit? \" No     3. For patients aged 39-70: Has the patient had a colonoscopy? NA based on age or sex     If the patient is female:    3. For patients aged 41-77: Has the patient had a mammogram within the past 2 years? NA based on age or sex    11. For patients aged 21-65: Has the patient had a pap smear? NA based on age or sex    Upcoming Appts  Injection Dr. Ponce Edwards: No orders of the defined types were placed in this encounter.  Reina Houston MD/Juli Crystal LPN    Stephie Mar is due for:   Health Maintenance Due   Topic    COVID-19 Vaccine (1)    Eye Exam Retinal or Dilated     Flu Vaccine (1)     Health Maintenance reviewed and discussed per provider  Please order/place referral if appropriate.     Requested Prescriptions      No prescriptions requested or ordered in this encounter       Learning Assessment:  Learning Assessment 1/22/2015 11/21/2014 4/4/2014   PRIMARY LEARNER Patient Patient Patient   HIGHEST LEVEL OF EDUCATION - PRIMARY LEARNER  - GRADUATED HIGH SCHOOL OR GED -   BARRIERS PRIMARY LEARNER NONE NONE -   PRIMARY LANGUAGE ENGLISH ENGLISH ENGLISH   LEARNER PREFERENCE PRIMARY READING LISTENING DEMONSTRATION     - READING -     - DEMONSTRATION -   ANSWERED BY pat Patient -   RELATIONSHIP SELF SELF -     Depression Screening:  3 most recent PHQ Screens 11/24/2021 8/31/2021 8/11/2021 8/3/2021 6/30/2021 4/1/2021 1/6/2021   PHQ Not Done - - Active Diagnosis of Depression or Bipolar Disorder - Active Diagnosis of Depression or Bipolar Disorder - -   Little interest or pleasure in doing things Not at all Not at all Not at all Not at all Not at all Not at all Not at all   Feeling down, depressed, irritable, or hopeless Not at all Not at all Several days Not at all Not at all Not at all Not at all   Total Score PHQ 2 0 0 1 0 0 0 0   Trouble falling or staying asleep, or sleeping too much - - - - - - -   Feeling tired or having little energy - - - - - - -   Poor appetite, weight loss, or overeating - - - - - - -   Feeling bad about yourself - or that you are a failure or have let yourself or your family down - - - - - - -   Trouble concentrating on things such as school, work, reading, or watching TV - - - - - - -   Moving or speaking so slowly that other people could have noticed; or the opposite being so fidgety that others notice - - - - - - -   Thoughts of being better off dead, or hurting yourself in some way - - - - - - -   PHQ 9 Score - - - - - - -     Abuse Screening:  Abuse Screening Questionnaire 8/11/2021 4/22/2019 7/31/2018 7/22/2015   Do you ever feel afraid of your partner? N Y N N   Are you in a relationship with someone who physically or mentally threatens you? N Y N N   Is it safe for you to go home? Shivam Lo     Fall Risk  Fall Risk Assessment, last 12 mths 8/31/2021 8/11/2021 8/3/2021 6/30/2021 4/1/2021 6/9/2020 4/22/2019   Able to walk?  Yes Yes Yes Yes Yes Yes Yes   Fall in past 12 months? 0 0 0 1 0 No No   Do you feel unsteady?  - 0 0 0 - - -   Are you worried about falling - 0 0 0 - - -   Is TUG test greater than 12 seconds? - - - 0 - - -   Is the gait abnormal? - - - 1 - - -   Number of falls in past 12 months - - - 2 - - -   Fall with injury? - - - 0 - - -     Recent Travel Screening and Travel History documentation     Travel Screening     No screening recorded since 11/23/21 0000     Travel History   Travel since 10/24/21    No documented travel since 10/24/21

## 2021-11-24 NOTE — PROGRESS NOTES
HISTORY OF PRESENT ILLNESS  Laurie Mayfield is a 68 y.o. female. BP (!) 142/67 (BP 1 Location: Left upper arm, BP Patient Position: Sitting, BP Cuff Size: Large adult)   Pulse 78   Temp 97.6 °F (36.4 °C) (Temporal)   Resp 19   Ht 5' 8\" (1.727 m)   Wt 172 lb 8 oz (78.2 kg)   SpO2 97%   BMI 26.23 kg/m²     Hurts to swallow. Feels like sand paper. Stomach is bloated. Bowels are irregular. Loose or hard  No recent dietary or medication changes        Current Outpatient Medications:     omeprazole (PRILOSEC) 40 mg capsule, Take 1 Capsule by mouth daily. , Disp: 60 Capsule, Rfl: 2    estradioL (CLIMARA) 0.1 mg/24 hr, APPLY 1 PATCH EVERY 7 DAYS, Disp: 4 Patch, Rfl: 5    simvastatin (ZOCOR) 20 mg tablet, TAKE 1 TABLET BY MOUTH ONCE DAILY AT BEDTIME, Disp: 90 Tab, Rfl: 3    ergocalciferol (ERGOCALCIFEROL) 1,250 mcg (50,000 unit) capsule, Take 1 capsule by mouth once a week, Disp: 12 Cap, Rfl: 5    rOPINIRole (REQUIP) 0.5 mg tablet, Take 1 Tab by mouth two (2) times daily as needed (restless syndrome). Indications: restless legs syndrome, an extreme discomfort in the calf muscles when sitting or lying down, Disp: 180 Tab, Rfl: 5    glucose blood VI test strips (ONETOUCH ULTRA TEST) strip, Use to test blood sugar daily, Disp: 100 Strip, Rfl: 5    albuterol (PROVENTIL HFA, VENTOLIN HFA, PROAIR HFA) 90 mcg/actuation inhaler, INHALE 2 PUFFS BY MOUTH EVERY 6 HOURS AS NEEDED FOR WHEEZING FOR SHORTNESS OF BREATH, Disp: 1 Inhaler, Rfl: 5    inhalational spacing device, Use spacer every time you use your inhaler, Disp: 1 Device, Rfl: 0    fluticasone (FLONASE) 50 mcg/actuation nasal spray, 2 Sprays by Both Nostrils route daily. Indications: ALLERGIC RHINITIS, Disp: 1 Bottle, Rfl: 5    aspirin (ASPIRIN) 325 mg tablet, Take 2 Tabs by mouth two (2) times a day. (Patient taking differently: Take 325 mg by mouth daily.), Disp: 90 Tab, Rfl: 5    multivitamin (ONE A DAY) tablet, Take 1 Tab by mouth daily. , Disp: , Rfl:     Blood-Glucose Meter (ONETOUCH ULTRA2) monitoring kit, Use to test blood sugar daily, Disp: 1 Kit, Rfl: 0    Lancing Device with Lancets (ONE TOUCH DELICA) kit, Use to test blood sugar daily, Disp: 1 Kit, Rfl: 0    metFORMIN ER (GLUCOPHAGE XR) 500 mg tablet, TAKE ONE TABLET BY MOUTH ONCE DAILY WITH SUPPER (Patient not taking: Reported on 11/24/2021), Disp: 90 Tab, Rfl: 5              previously seen for similar reasons:  Alicia Flores MD - 05/01/2019 2:00 PM EDT  Morena Tian is in for follow up after her recent upper endoscopy and bx of duodenum and stomach. No significant changes on path. No H.pylori. Small HH noted. She still has indigestion that occurs pretty much, every day. She is careful with her diet and avoids, hamburger, spices, seasoning, alcohol, wine, fried foods. She has had some nausea but no emesis. Her mother had gallstones. Mother's family with Apolinar Berman. Maternal aunt had breast cancer and another aunt possibly with ovarian cancer. Clinical exam is unchanged with no guarding, palpable mass or remarkable tenderness. I have suggested that she have repeat GB US and if negative follow up with CT A/P. Also is due for mammograms and will order that as well. Will see again after studies. Norita Mortimer, MD                 Review of Systems   Constitutional: Negative for chills and fever. Gastrointestinal: Positive for constipation, diarrhea and nausea. Negative for blood in stool, melena and vomiting. Physical Exam  Vitals and nursing note reviewed. Constitutional:       Appearance: Normal appearance. She is well-developed. HENT:      Head: Normocephalic and atraumatic. Mouth/Throat:      Mouth: Mucous membranes are moist.      Pharynx: Oropharynx is clear. Comments: No evidence thrush in mouth  Cardiovascular:      Rate and Rhythm: Normal rate and regular rhythm.    Pulmonary:      Effort: Pulmonary effort is normal.      Breath sounds: Normal breath sounds. Abdominal:      General: Bowel sounds are normal. There is distension. Palpations: There is no mass. Tenderness: There is no abdominal tenderness. Musculoskeletal:      Right lower leg: No edema. Left lower leg: No edema. Skin:     General: Skin is warm and dry. Neurological:      General: No focal deficit present. Mental Status: She is alert and oriented to person, place, and time. Psychiatric:         Mood and Affect: Mood normal.         Behavior: Behavior normal.         ASSESSMENT and PLAN    ICD-10-CM ICD-9-CM    1. Abdominal bloating  R14.0 787.3 CBC WITH AUTOMATED DIFF      URINALYSIS W/ RFLX MICROSCOPIC      AMYLASE      LIPASE      REFERRAL TO GENERAL SURGERY      US ABD COMP   2. Irregular bowel habits  P97.6 870.07 METABOLIC PANEL, COMPREHENSIVE      CBC WITH AUTOMATED DIFF      REFERRAL TO GENERAL SURGERY      US ABD COMP   3. Type 2 diabetes mellitus with diabetic neuropathy, without long-term current use of insulin ([facility])  Y75.88 649.53 METABOLIC PANEL, COMPREHENSIVE     357.2 HEMOGLOBIN A1C W/O EAG      LIPID PANEL   4. Dyslipidemia  E78.5 272.4 LIPID PANEL   5. Vitamin D deficiency  E55.9 268.9 VITAMIN D, 25 HYDROXY       I suspect this is just IBS  Similar event  In 2019. W/U negative  Refill prilosec    Lab today  Request U/S    Referral to Dr. Radha Deluca, gen surgeon who has seen her before for similar issue.     F/u 6 weeks for recheck on stomach

## 2021-11-25 LAB
25(OH)D3+25(OH)D2 SERPL-MCNC: 42.2 NG/ML (ref 30–100)
ALBUMIN SERPL-MCNC: 4.5 G/DL (ref 3.7–4.7)
ALBUMIN/GLOB SERPL: 1.7 {RATIO} (ref 1.2–2.2)
ALP SERPL-CCNC: 72 IU/L (ref 44–121)
ALT SERPL-CCNC: 16 IU/L (ref 0–32)
AMYLASE SERPL-CCNC: 33 U/L (ref 31–110)
APPEARANCE UR: CLEAR
AST SERPL-CCNC: 22 IU/L (ref 0–40)
BASOPHILS # BLD AUTO: 0.1 X10E3/UL (ref 0–0.2)
BASOPHILS NFR BLD AUTO: 1 %
BILIRUB SERPL-MCNC: 0.5 MG/DL (ref 0–1.2)
BILIRUB UR QL STRIP: NEGATIVE
BUN SERPL-MCNC: 13 MG/DL (ref 8–27)
BUN/CREAT SERPL: 20 (ref 12–28)
CALCIUM SERPL-MCNC: 9.3 MG/DL (ref 8.7–10.3)
CHLORIDE SERPL-SCNC: 104 MMOL/L (ref 96–106)
CHOLEST SERPL-MCNC: 195 MG/DL (ref 100–199)
CO2 SERPL-SCNC: 25 MMOL/L (ref 20–29)
COLOR UR: YELLOW
CREAT SERPL-MCNC: 0.64 MG/DL (ref 0.57–1)
EOSINOPHIL # BLD AUTO: 0.1 X10E3/UL (ref 0–0.4)
EOSINOPHIL NFR BLD AUTO: 2 %
ERYTHROCYTE [DISTWIDTH] IN BLOOD BY AUTOMATED COUNT: 12.2 % (ref 11.7–15.4)
GLOBULIN SER CALC-MCNC: 2.7 G/DL (ref 1.5–4.5)
GLUCOSE SERPL-MCNC: 80 MG/DL (ref 65–99)
GLUCOSE UR QL: NEGATIVE
HBA1C MFR BLD: 6.5 % (ref 4.8–5.6)
HCT VFR BLD AUTO: 41.9 % (ref 34–46.6)
HDLC SERPL-MCNC: 54 MG/DL
HGB BLD-MCNC: 14.9 G/DL (ref 11.1–15.9)
HGB UR QL STRIP: NEGATIVE
IMM GRANULOCYTES # BLD AUTO: 0 X10E3/UL (ref 0–0.1)
IMM GRANULOCYTES NFR BLD AUTO: 0 %
IMP & REVIEW OF LAB RESULTS: NORMAL
KETONES UR QL STRIP: NEGATIVE
LDLC SERPL CALC-MCNC: 118 MG/DL (ref 0–99)
LEUKOCYTE ESTERASE UR QL STRIP: NEGATIVE
LIPASE SERPL-CCNC: 32 U/L (ref 14–85)
LYMPHOCYTES # BLD AUTO: 2.8 X10E3/UL (ref 0.7–3.1)
LYMPHOCYTES NFR BLD AUTO: 43 %
MCH RBC QN AUTO: 32.7 PG (ref 26.6–33)
MCHC RBC AUTO-ENTMCNC: 35.6 G/DL (ref 31.5–35.7)
MCV RBC AUTO: 92 FL (ref 79–97)
MICRO URNS: NORMAL
MONOCYTES # BLD AUTO: 0.7 X10E3/UL (ref 0.1–0.9)
MONOCYTES NFR BLD AUTO: 10 %
NEUTROPHILS # BLD AUTO: 3 X10E3/UL (ref 1.4–7)
NEUTROPHILS NFR BLD AUTO: 44 %
NITRITE UR QL STRIP: NEGATIVE
PH UR STRIP: 5 [PH] (ref 5–7.5)
PLATELET # BLD AUTO: 234 X10E3/UL (ref 150–450)
POTASSIUM SERPL-SCNC: 3.7 MMOL/L (ref 3.5–5.2)
PROT SERPL-MCNC: 7.2 G/DL (ref 6–8.5)
PROT UR QL STRIP: NEGATIVE
RBC # BLD AUTO: 4.56 X10E6/UL (ref 3.77–5.28)
SODIUM SERPL-SCNC: 140 MMOL/L (ref 134–144)
SP GR UR: 1.01 (ref 1–1.03)
TRIGL SERPL-MCNC: 127 MG/DL (ref 0–149)
UROBILINOGEN UR STRIP-MCNC: 0.2 MG/DL (ref 0.2–1)
VLDLC SERPL CALC-MCNC: 23 MG/DL (ref 5–40)
WBC # BLD AUTO: 6.7 X10E3/UL (ref 3.4–10.8)

## 2021-12-01 ENCOUNTER — HOSPITAL ENCOUNTER (OUTPATIENT)
Dept: ULTRASOUND IMAGING | Age: 76
Discharge: HOME | End: 2021-12-01
Attending: INTERNAL MEDICINE
Payer: MEDICARE

## 2021-12-01 DIAGNOSIS — R19.8 IRREGULAR BOWEL HABITS: ICD-10-CM

## 2021-12-01 DIAGNOSIS — R14.0 ABDOMINAL BLOATING: ICD-10-CM

## 2021-12-01 PROCEDURE — 76700 US EXAM ABDOM COMPLETE: CPT

## 2021-12-16 DIAGNOSIS — R14.0 ABDOMINAL BLOATING: ICD-10-CM

## 2021-12-16 DIAGNOSIS — R10.84 GENERALIZED ABDOMINAL PAIN: ICD-10-CM

## 2022-01-11 ENCOUNTER — OFFICE VISIT (OUTPATIENT)
Dept: INTERNAL MEDICINE CLINIC | Age: 77
End: 2022-01-11
Payer: MEDICARE

## 2022-01-11 VITALS
HEART RATE: 67 BPM | TEMPERATURE: 97.1 F | BODY MASS INDEX: 26.73 KG/M2 | HEIGHT: 68 IN | DIASTOLIC BLOOD PRESSURE: 77 MMHG | SYSTOLIC BLOOD PRESSURE: 139 MMHG | RESPIRATION RATE: 20 BRPM | WEIGHT: 176.4 LBS | OXYGEN SATURATION: 96 %

## 2022-01-11 DIAGNOSIS — E11.40 TYPE 2 DIABETES MELLITUS WITH DIABETIC NEUROPATHY, WITHOUT LONG-TERM CURRENT USE OF INSULIN (HCC): ICD-10-CM

## 2022-01-11 DIAGNOSIS — R10.9 FLANK PAIN: Primary | ICD-10-CM

## 2022-01-11 DIAGNOSIS — R25.1 TREMOR OF LEFT HAND: ICD-10-CM

## 2022-01-11 DIAGNOSIS — R39.15 URINARY URGENCY: ICD-10-CM

## 2022-01-11 DIAGNOSIS — R35.0 URINARY FREQUENCY: ICD-10-CM

## 2022-01-11 DIAGNOSIS — M54.15 RADICULOPATHY OF THORACOLUMBAR REGION: ICD-10-CM

## 2022-01-11 DIAGNOSIS — N64.4 BREAST PAIN, LEFT: ICD-10-CM

## 2022-01-11 PROCEDURE — G8419 CALC BMI OUT NRM PARAM NOF/U: HCPCS | Performed by: INTERNAL MEDICINE

## 2022-01-11 PROCEDURE — 1090F PRES/ABSN URINE INCON ASSESS: CPT | Performed by: INTERNAL MEDICINE

## 2022-01-11 PROCEDURE — G8427 DOCREV CUR MEDS BY ELIG CLIN: HCPCS | Performed by: INTERNAL MEDICINE

## 2022-01-11 PROCEDURE — G8510 SCR DEP NEG, NO PLAN REQD: HCPCS | Performed by: INTERNAL MEDICINE

## 2022-01-11 PROCEDURE — 1101F PT FALLS ASSESS-DOCD LE1/YR: CPT | Performed by: INTERNAL MEDICINE

## 2022-01-11 PROCEDURE — G8536 NO DOC ELDER MAL SCRN: HCPCS | Performed by: INTERNAL MEDICINE

## 2022-01-11 PROCEDURE — G8399 PT W/DXA RESULTS DOCUMENT: HCPCS | Performed by: INTERNAL MEDICINE

## 2022-01-11 PROCEDURE — 99214 OFFICE O/P EST MOD 30 MIN: CPT | Performed by: INTERNAL MEDICINE

## 2022-01-11 NOTE — PROGRESS NOTES
HISTORY OF PRESENT ILLNESS  Markell Ellison is a 68 y.o. female. /77 (BP 1 Location: Right upper arm, BP Patient Position: Sitting, BP Cuff Size: Adult)   Pulse 67   Temp 97.1 °F (36.2 °C) (Temporal)   Resp 20   Ht 5' 8\" (1.727 m)   Wt 176 lb 6.4 oz (80 kg)   SpO2 96%   BMI 26.82 kg/m²     C/o right flank pain for 1-2 weeks    Reports increasing urinary frequency--every few minutes. She had a CT 9/21/21 of the abdomen and pelvis and no stones were seen  U/S 12/1/21 also saw nothing on the upper right to explain the pain. Tiny renal cyst      C/o recurrent left breast pain. She has an appt with Surgeon this month      Review of Systems   Constitutional: Negative for chills and fever. Cardiovascular: Negative for chest pain and palpitations. Genitourinary: Negative for frequency and urgency. Left breast pain (this is not new AND she has an appt coming up with a breast surgeon)   Musculoskeletal: Positive for back pain and joint pain. Neurological: Positive for tremors. Physical Exam  Vitals and nursing note reviewed. Constitutional:       Appearance: Normal appearance. She is well-developed. HENT:      Head: Normocephalic and atraumatic. Cardiovascular:      Rate and Rhythm: Normal rate and regular rhythm. Pulmonary:      Effort: Pulmonary effort is normal.      Breath sounds: Normal breath sounds. Musculoskeletal:      Right lower leg: No edema. Left lower leg: No edema. Skin:     General: Skin is warm and dry. Neurological:      General: No focal deficit present. Mental Status: She is alert and oriented to person, place, and time. Cranial Nerves: Cranial nerves are intact. Sensory: Sensation is intact. Motor: Motor function is intact. Comments: No tremor noted on left hand   Psychiatric:         Mood and Affect: Mood normal.         Behavior: Behavior normal.         ASSESSMENT and PLAN    ICD-10-CM ICD-9-CM    1.  Flank pain R10.9 789.09 URINALYSIS W/ RFLX MICROSCOPIC      CULTURE, URINE      XR CHEST PA LAT   2. Radiculopathy of thoracolumbar region  M54.15 724.4 XR SPINE THORAC 3 V      XR SPINE LUMB 2 OR 3 V   3. Breast pain, left  N64.4 611.71 JOMAR MAMMO BI DX INCL CAD   4. Urinary frequency  R35.0 788.41 REFERRAL TO UROLOGY   5. Urinary urgency  R39.15 788.63 REFERRAL TO UROLOGY   6. Tremor of left hand  R25.1 781.0    7. Type 2 diabetes mellitus with diabetic neuropathy, without long-term current use of insulin ([facility])  E11.40 250.60      357.2        Flank pain. ? Etiology. Need to look at her back more since CT and u/s have not found anything of note  xrays as noted.     Check urine for infection  Refer to urology    Update mammogram   F/U as appointed with surgeon    F/u 6 weeks for BS, flank pain

## 2022-01-11 NOTE — PROGRESS NOTES
Heydi Carmichael is a 68 y.o. female (: 1945) presenting to address:    Chief Complaint   Patient presents with    Hypertension    Diabetes    Flank Pain     patient c/o RT flank pain x 1 1/2 weeks      pain scale 7/10       Vitals:    22 1543   BP: 139/77   Pulse: 67   Resp: 20   Temp: 97.1 °F (36.2 °C)   TempSrc: Temporal   SpO2: 96%   Weight: 176 lb 6.4 oz (80 kg)   Height: 5' 8\" (1.727 m)   PainSc:   7   PainLoc: Flank       Hearing/Vision:   No exam data present    Learning Assessment:     Learning Assessment 2015   PRIMARY LEARNER Patient   HIGHEST LEVEL OF EDUCATION - PRIMARY LEARNER  -   BARRIERS PRIMARY LEARNER NONE   PRIMARY LANGUAGE ENGLISH   LEARNER PREFERENCE PRIMARY READING     -     -   ANSWERED BY pat   RELATIONSHIP SELF     Depression Screening:     3 most recent PHQ Screens 2022   PHQ Not Done -   Little interest or pleasure in doing things Not at all   Feeling down, depressed, irritable, or hopeless Not at all   Total Score PHQ 2 0   Trouble falling or staying asleep, or sleeping too much -   Feeling tired or having little energy -   Poor appetite, weight loss, or overeating -   Feeling bad about yourself - or that you are a failure or have let yourself or your family down -   Trouble concentrating on things such as school, work, reading, or watching TV -   Moving or speaking so slowly that other people could have noticed; or the opposite being so fidgety that others notice -   Thoughts of being better off dead, or hurting yourself in some way -   PHQ 9 Score -     Fall Risk Assessment:     Fall Risk Assessment, last 12 mths 2022   Able to walk? Yes   Fall in past 12 months? 0   Do you feel unsteady? -   Are you worried about falling -   Is TUG test greater than 12 seconds? -   Is the gait abnormal? -   Number of falls in past 12 months -   Fall with injury?  -     Abuse Screening:     Abuse Screening Questionnaire 2021   Do you ever feel afraid of your partner? N   Are you in a relationship with someone who physically or mentally threatens you? N   Is it safe for you to go home? Y     Coordination of Care Questionaire:     Advanced Directive:   1. Do you have an Advanced Directive? NO    2. Would you like information on Advanced Directives? NO    1. \"Have you been to the ER, urgent care clinic since your last visit? Hospitalized since your last visit? \" No    2. \"Have you seen or consulted any other health care providers outside of the [address] [place] [address] since your last visit? \" No     3. For patients aged 39-70: Has the patient had a colonoscopy? No     If the patient is female:    4. For patients aged 41-77: Has the patient had a mammogram within the past 2 years? NA based on age or sex    11. For patients aged 21-65: Has the patient had a pap smear?  NA based on age or sex

## 2022-01-14 LAB
APPEARANCE UR: ABNORMAL
BACTERIA UR CULT: NORMAL
BILIRUB UR QL STRIP: NEGATIVE
COLOR UR: YELLOW
GLUCOSE UR QL: NEGATIVE
HGB UR QL STRIP: NEGATIVE
KETONES UR QL STRIP: NEGATIVE
LEUKOCYTE ESTERASE UR QL STRIP: NEGATIVE
MICRO URNS: ABNORMAL
NITRITE UR QL STRIP: NEGATIVE
PH UR STRIP: 5.5 [PH] (ref 5–7.5)
PROT UR QL STRIP: NEGATIVE
SP GR UR: 1.01 (ref 1–1.03)
UROBILINOGEN UR STRIP-MCNC: 0.2 MG/DL (ref 0.2–1)

## 2022-03-14 ENCOUNTER — OFFICE VISIT (OUTPATIENT)
Dept: INTERNAL MEDICINE CLINIC | Age: 77
End: 2022-03-14
Payer: MEDICARE

## 2022-03-14 VITALS
WEIGHT: 176 LBS | RESPIRATION RATE: 18 BRPM | DIASTOLIC BLOOD PRESSURE: 68 MMHG | TEMPERATURE: 97.3 F | HEIGHT: 68 IN | BODY MASS INDEX: 26.67 KG/M2 | SYSTOLIC BLOOD PRESSURE: 114 MMHG | OXYGEN SATURATION: 98 % | HEART RATE: 65 BPM

## 2022-03-14 DIAGNOSIS — E11.40 TYPE 2 DIABETES MELLITUS WITH DIABETIC NEUROPATHY, WITHOUT LONG-TERM CURRENT USE OF INSULIN (HCC): Primary | ICD-10-CM

## 2022-03-14 DIAGNOSIS — G25.81 RLS (RESTLESS LEGS SYNDROME): ICD-10-CM

## 2022-03-14 DIAGNOSIS — E78.5 DYSLIPIDEMIA: ICD-10-CM

## 2022-03-14 DIAGNOSIS — E55.9 VITAMIN D DEFICIENCY: ICD-10-CM

## 2022-03-14 DIAGNOSIS — Z76.0 MEDICATION REFILL: ICD-10-CM

## 2022-03-14 DIAGNOSIS — R19.8 IRREGULAR BOWEL HABITS: ICD-10-CM

## 2022-03-14 PROCEDURE — G8510 SCR DEP NEG, NO PLAN REQD: HCPCS | Performed by: INTERNAL MEDICINE

## 2022-03-14 PROCEDURE — G8427 DOCREV CUR MEDS BY ELIG CLIN: HCPCS | Performed by: INTERNAL MEDICINE

## 2022-03-14 PROCEDURE — G8399 PT W/DXA RESULTS DOCUMENT: HCPCS | Performed by: INTERNAL MEDICINE

## 2022-03-14 PROCEDURE — G8536 NO DOC ELDER MAL SCRN: HCPCS | Performed by: INTERNAL MEDICINE

## 2022-03-14 PROCEDURE — 1090F PRES/ABSN URINE INCON ASSESS: CPT | Performed by: INTERNAL MEDICINE

## 2022-03-14 PROCEDURE — 99214 OFFICE O/P EST MOD 30 MIN: CPT | Performed by: INTERNAL MEDICINE

## 2022-03-14 PROCEDURE — G8419 CALC BMI OUT NRM PARAM NOF/U: HCPCS | Performed by: INTERNAL MEDICINE

## 2022-03-14 PROCEDURE — 1101F PT FALLS ASSESS-DOCD LE1/YR: CPT | Performed by: INTERNAL MEDICINE

## 2022-03-14 RX ORDER — ALBUTEROL SULFATE 90 UG/1
AEROSOL, METERED RESPIRATORY (INHALATION)
Qty: 1 EACH | Refills: 11 | Status: SHIPPED | OUTPATIENT
Start: 2022-03-14

## 2022-03-14 RX ORDER — ESTRADIOL 0.1 MG/D
1 PATCH TRANSDERMAL
Qty: 4 PATCH | Refills: 11 | Status: SHIPPED | OUTPATIENT
Start: 2022-03-14

## 2022-03-14 RX ORDER — SIMVASTATIN 20 MG/1
20 TABLET, FILM COATED ORAL DAILY
Qty: 90 TABLET | Refills: 3 | Status: SHIPPED | OUTPATIENT
Start: 2022-03-14 | End: 2022-10-04

## 2022-03-14 RX ORDER — ROPINIROLE 0.5 MG/1
0.5 TABLET, FILM COATED ORAL
Qty: 180 TABLET | Refills: 5 | Status: SHIPPED | OUTPATIENT
Start: 2022-03-14
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
## 2022-03-14 NOTE — PROGRESS NOTES
1. \"Have you been to the ER, urgent care clinic since your last visit? Hospitalized since your last visit? \" No    2. \"Have you seen or consulted any other health care providers outside of the [address] [place] [address] since your last visit? \" Yes Dr. Allie Chapa     3. For patients aged 39-70: Has the patient had a colonoscopy / FIT/ Cologuard? NA - based on age      If the patient is female:    4. For patients aged 41-77: Has the patient had a mammogram within the past 2 years? NA - based on age or sex      11. For patients aged 21-65: Has the patient had a pap smear?  NA - based on age or sex

## 2022-03-15 LAB
25(OH)D3+25(OH)D2 SERPL-MCNC: 58.1 NG/ML (ref 30–100)
ALBUMIN SERPL-MCNC: 4.6 G/DL (ref 3.7–4.7)
ALBUMIN/GLOB SERPL: 1.8 {RATIO} (ref 1.2–2.2)
ALP SERPL-CCNC: 62 IU/L (ref 44–121)
ALT SERPL-CCNC: 20 IU/L (ref 0–32)
AST SERPL-CCNC: 27 IU/L (ref 0–40)
BILIRUB SERPL-MCNC: 0.7 MG/DL (ref 0–1.2)
BUN SERPL-MCNC: 12 MG/DL (ref 8–27)
BUN/CREAT SERPL: 17 (ref 12–28)
CALCIUM SERPL-MCNC: 9.5 MG/DL (ref 8.7–10.3)
CHLORIDE SERPL-SCNC: 103 MMOL/L (ref 96–106)
CHOLEST SERPL-MCNC: 181 MG/DL (ref 100–199)
CO2 SERPL-SCNC: 23 MMOL/L (ref 20–29)
CREAT SERPL-MCNC: 0.72 MG/DL (ref 0.57–1)
EGFR: 87 ML/MIN/1.73
GLOBULIN SER CALC-MCNC: 2.5 G/DL (ref 1.5–4.5)
GLUCOSE SERPL-MCNC: 99 MG/DL (ref 65–99)
HBA1C MFR BLD: 6.4 % (ref 4.8–5.6)
HDLC SERPL-MCNC: 52 MG/DL
IMP & REVIEW OF LAB RESULTS: NORMAL
LDLC SERPL CALC-MCNC: 107 MG/DL (ref 0–99)
POTASSIUM SERPL-SCNC: 4.4 MMOL/L (ref 3.5–5.2)
PROT SERPL-MCNC: 7.1 G/DL (ref 6–8.5)
SODIUM SERPL-SCNC: 140 MMOL/L (ref 134–144)
TRIGL SERPL-MCNC: 125 MG/DL (ref 0–149)
VLDLC SERPL CALC-MCNC: 22 MG/DL (ref 5–40)

## 2022-03-18 PROBLEM — E11.9 TYPE 2 DIABETES MELLITUS WITHOUT COMPLICATION, WITHOUT LONG-TERM CURRENT USE OF INSULIN (HCC): Status: ACTIVE | Noted: 2017-08-11

## 2022-03-19 PROBLEM — E11.40 TYPE 2 DIABETES MELLITUS WITH DIABETIC NEUROPATHY (HCC): Status: ACTIVE | Noted: 2021-08-03

## 2022-04-25 ENCOUNTER — OFFICE VISIT (OUTPATIENT)
Dept: INTERNAL MEDICINE CLINIC | Age: 77
End: 2022-04-25
Payer: MEDICARE

## 2022-04-25 VITALS
TEMPERATURE: 97.3 F | HEIGHT: 68 IN | DIASTOLIC BLOOD PRESSURE: 62 MMHG | RESPIRATION RATE: 16 BRPM | HEART RATE: 76 BPM | SYSTOLIC BLOOD PRESSURE: 147 MMHG | WEIGHT: 176 LBS | BODY MASS INDEX: 26.67 KG/M2 | OXYGEN SATURATION: 97 %

## 2022-04-25 DIAGNOSIS — R21 RASH: Primary | ICD-10-CM

## 2022-04-25 DIAGNOSIS — M54.2 NECK PAIN: ICD-10-CM

## 2022-04-25 DIAGNOSIS — M25.562 LEFT KNEE PAIN, UNSPECIFIED CHRONICITY: ICD-10-CM

## 2022-04-25 PROCEDURE — 99213 OFFICE O/P EST LOW 20 MIN: CPT | Performed by: INTERNAL MEDICINE

## 2022-04-25 PROCEDURE — 1101F PT FALLS ASSESS-DOCD LE1/YR: CPT | Performed by: INTERNAL MEDICINE

## 2022-04-25 PROCEDURE — G8510 SCR DEP NEG, NO PLAN REQD: HCPCS | Performed by: INTERNAL MEDICINE

## 2022-04-25 PROCEDURE — G8427 DOCREV CUR MEDS BY ELIG CLIN: HCPCS | Performed by: INTERNAL MEDICINE

## 2022-04-25 PROCEDURE — 1090F PRES/ABSN URINE INCON ASSESS: CPT | Performed by: INTERNAL MEDICINE

## 2022-04-25 PROCEDURE — G8536 NO DOC ELDER MAL SCRN: HCPCS | Performed by: INTERNAL MEDICINE

## 2022-04-25 PROCEDURE — G8399 PT W/DXA RESULTS DOCUMENT: HCPCS | Performed by: INTERNAL MEDICINE

## 2022-04-25 PROCEDURE — G8419 CALC BMI OUT NRM PARAM NOF/U: HCPCS | Performed by: INTERNAL MEDICINE

## 2022-04-25 RX ORDER — TRIAMCINOLONE ACETONIDE 0.25 MG/ML
LOTION TOPICAL 2 TIMES DAILY
Qty: 60 ML | Refills: 2 | Status: SHIPPED | OUTPATIENT
Start: 2022-04-25

## 2022-04-25 NOTE — PROGRESS NOTES
HISTORY OF PRESENT ILLNESS  Zaynab Gallego is a 68 y.o. female. BP (!) 147/62 (BP 1 Location: Left upper arm, BP Patient Position: Sitting, BP Cuff Size: Adult)   Pulse 76   Temp 97.3 °F (36.3 °C) (Temporal)   Resp 16   Ht 5' 8\" (1.727 m)   Wt 176 lb (79.8 kg)   SpO2 97%   BMI 26.76 kg/m²     About 5 day h/o \"rash\" and itching starting on her right forearm. Now on both arms. Started as red raised papules. One small macule on right forearm    denies any new soaps, etc  No new meds or supplements  Owns a cat who is indoor only. Has not seen fleas, etc.    Rash   The history is provided by the patient. This is a new problem. The current episode started more than 2 days ago. The problem has been gradually worsening. Associated symptoms include itching. Treatments tried: OTC topical antifungal.       Review of Systems   Constitutional: Negative for chills and fever. Skin: Positive for itching and rash. Physical Exam  Vitals and nursing note reviewed. Constitutional:       Appearance: Normal appearance. Skin:     Findings: Rash present. Comments: Scattered pink papules and macules on arms. Largest is about 1.5 cm macule on right forearm which pt states is the initial lesion. Neurological:      Mental Status: She is alert. ASSESSMENT and PLAN    ICD-10-CM ICD-9-CM    1. Rash  R21 782.1 triamcinolone acetonide 0.025 % lotion   2. Left knee pain, unspecified chronicity  M25.562 719.46 REFERRAL TO ORTHOPEDICS   3. Neck pain  M54.2 723.1 REFERRAL TO PHYSIATRY       Rash. ? Contact. Does not resemble bites. ?  pityriasis  Start triamcinolone lotion BID    Incidental referrals to ortho and physiatry as noted    F/u here as appointed in August. Otherwise, let us know in about 2 weeks how this is going and if needs referral to derm

## 2022-04-25 NOTE — PROGRESS NOTES
1. \"Have you been to the ER, urgent care clinic since your last visit? Hospitalized since your last visit? \" No    2. \"Have you seen or consulted any other health care providers outside of the [address] [place] [address] since your last visit? \" No     3. For patients aged 39-70: Has the patient had a colonoscopy / FIT/ Cologuard? NA - based on age      If the patient is female:    4. For patients aged 41-77: Has the patient had a mammogram within the past 2 years? NA - based on age or sex      11. For patients aged 21-65: Has the patient had a pap smear?  NA - based on age or sex

## 2022-07-07 ENCOUNTER — OFFICE VISIT (OUTPATIENT)
Dept: INTERNAL MEDICINE CLINIC | Age: 77
End: 2022-07-07
Payer: MEDICARE

## 2022-07-07 VITALS
TEMPERATURE: 97.2 F | DIASTOLIC BLOOD PRESSURE: 78 MMHG | BODY MASS INDEX: 26.39 KG/M2 | WEIGHT: 174.13 LBS | HEART RATE: 68 BPM | HEIGHT: 68 IN | SYSTOLIC BLOOD PRESSURE: 146 MMHG | OXYGEN SATURATION: 95 % | RESPIRATION RATE: 18 BRPM

## 2022-07-07 DIAGNOSIS — M25.50 ARTHRALGIA, UNSPECIFIED JOINT: ICD-10-CM

## 2022-07-07 DIAGNOSIS — E78.5 DYSLIPIDEMIA: ICD-10-CM

## 2022-07-07 DIAGNOSIS — M25.562 LEFT KNEE PAIN, UNSPECIFIED CHRONICITY: ICD-10-CM

## 2022-07-07 DIAGNOSIS — R10.13 EPIGASTRIC PAIN: Primary | ICD-10-CM

## 2022-07-07 DIAGNOSIS — M54.2 NECK PAIN: ICD-10-CM

## 2022-07-07 DIAGNOSIS — R19.8 IRREGULAR BOWEL HABITS: ICD-10-CM

## 2022-07-07 DIAGNOSIS — E11.40 TYPE 2 DIABETES MELLITUS WITH DIABETIC NEUROPATHY, WITHOUT LONG-TERM CURRENT USE OF INSULIN (HCC): ICD-10-CM

## 2022-07-07 PROCEDURE — G8399 PT W/DXA RESULTS DOCUMENT: HCPCS | Performed by: INTERNAL MEDICINE

## 2022-07-07 PROCEDURE — 1090F PRES/ABSN URINE INCON ASSESS: CPT | Performed by: INTERNAL MEDICINE

## 2022-07-07 PROCEDURE — G8510 SCR DEP NEG, NO PLAN REQD: HCPCS | Performed by: INTERNAL MEDICINE

## 2022-07-07 PROCEDURE — 3044F HG A1C LEVEL LT 7.0%: CPT | Performed by: INTERNAL MEDICINE

## 2022-07-07 PROCEDURE — 1101F PT FALLS ASSESS-DOCD LE1/YR: CPT | Performed by: INTERNAL MEDICINE

## 2022-07-07 PROCEDURE — G8417 CALC BMI ABV UP PARAM F/U: HCPCS | Performed by: INTERNAL MEDICINE

## 2022-07-07 PROCEDURE — 99214 OFFICE O/P EST MOD 30 MIN: CPT | Performed by: INTERNAL MEDICINE

## 2022-07-07 PROCEDURE — G8427 DOCREV CUR MEDS BY ELIG CLIN: HCPCS | Performed by: INTERNAL MEDICINE

## 2022-07-07 PROCEDURE — 1123F ACP DISCUSS/DSCN MKR DOCD: CPT | Performed by: INTERNAL MEDICINE

## 2022-07-07 PROCEDURE — G8536 NO DOC ELDER MAL SCRN: HCPCS | Performed by: INTERNAL MEDICINE

## 2022-07-07 NOTE — PROGRESS NOTES
1. \"Have you been to the ER, urgent care clinic since your last visit? Hospitalized since your last visit? \" No    2. \"Have you seen or consulted any other health care providers outside of the [address] [place] [address] since your last visit? \" No     3. For patients aged 39-70: Has the patient had a colonoscopy / FIT/ Cologuard? NA - based on age      If the patient is female:    4. For patients aged 41-77: Has the patient had a mammogram within the past 2 years? NA - based on age or sex      11. For patients aged 21-65: Has the patient had a pap smear?  NA - based on age or sex
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
## 2022-07-08 LAB
ALBUMIN SERPL-MCNC: 4 G/DL (ref 3.7–4.7)
ALBUMIN/CREAT UR: <7 MG/G CREAT (ref 0–29)
ALBUMIN/GLOB SERPL: 1.3 {RATIO} (ref 1.2–2.2)
ALP SERPL-CCNC: 56 IU/L (ref 44–121)
ALT SERPL-CCNC: 14 IU/L (ref 0–32)
AST SERPL-CCNC: 19 IU/L (ref 0–40)
BASOPHILS # BLD AUTO: 0.1 X10E3/UL (ref 0–0.2)
BASOPHILS NFR BLD AUTO: 2 %
BILIRUB SERPL-MCNC: 0.4 MG/DL (ref 0–1.2)
BUN SERPL-MCNC: 12 MG/DL (ref 8–27)
BUN/CREAT SERPL: 18 (ref 12–28)
CALCIUM SERPL-MCNC: 9.9 MG/DL (ref 8.7–10.3)
CHLORIDE SERPL-SCNC: 108 MMOL/L (ref 96–106)
CHOLEST SERPL-MCNC: 191 MG/DL (ref 100–199)
CO2 SERPL-SCNC: 20 MMOL/L (ref 20–29)
CREAT SERPL-MCNC: 0.68 MG/DL (ref 0.57–1)
CREAT UR-MCNC: 43.3 MG/DL
EGFR: 90 ML/MIN/1.73
EOSINOPHIL # BLD AUTO: 0.1 X10E3/UL (ref 0–0.4)
EOSINOPHIL NFR BLD AUTO: 2 %
ERYTHROCYTE [DISTWIDTH] IN BLOOD BY AUTOMATED COUNT: 12.5 % (ref 11.7–15.4)
GLOBULIN SER CALC-MCNC: 3 G/DL (ref 1.5–4.5)
GLUCOSE SERPL-MCNC: 89 MG/DL (ref 65–99)
HBA1C MFR BLD: 6.2 % (ref 4.8–5.6)
HCT VFR BLD AUTO: 39.3 % (ref 34–46.6)
HDLC SERPL-MCNC: 42 MG/DL
HGB BLD-MCNC: 13.4 G/DL (ref 11.1–15.9)
IMM GRANULOCYTES # BLD AUTO: 0 X10E3/UL (ref 0–0.1)
IMM GRANULOCYTES NFR BLD AUTO: 0 %
IMP & REVIEW OF LAB RESULTS: NORMAL
LDLC SERPL CALC-MCNC: 107 MG/DL (ref 0–99)
LIPASE SERPL-CCNC: 39 U/L (ref 14–85)
LYMPHOCYTES # BLD AUTO: 2 X10E3/UL (ref 0.7–3.1)
LYMPHOCYTES NFR BLD AUTO: 40 %
MCH RBC QN AUTO: 31.8 PG (ref 26.6–33)
MCHC RBC AUTO-ENTMCNC: 34.1 G/DL (ref 31.5–35.7)
MCV RBC AUTO: 93 FL (ref 79–97)
MICROALBUMIN UR-MCNC: <3 UG/ML
MONOCYTES # BLD AUTO: 0.5 X10E3/UL (ref 0.1–0.9)
MONOCYTES NFR BLD AUTO: 10 %
NEUTROPHILS # BLD AUTO: 2.4 X10E3/UL (ref 1.4–7)
NEUTROPHILS NFR BLD AUTO: 46 %
PLATELET # BLD AUTO: 191 X10E3/UL (ref 150–450)
POTASSIUM SERPL-SCNC: 4.2 MMOL/L (ref 3.5–5.2)
PROT SERPL-MCNC: 7 G/DL (ref 6–8.5)
RBC # BLD AUTO: 4.22 X10E6/UL (ref 3.77–5.28)
SODIUM SERPL-SCNC: 141 MMOL/L (ref 134–144)
TRIGL SERPL-MCNC: 241 MG/DL (ref 0–149)
VLDLC SERPL CALC-MCNC: 42 MG/DL (ref 5–40)
WBC # BLD AUTO: 5 X10E3/UL (ref 3.4–10.8)

## 2022-07-11 ENCOUNTER — TELEPHONE (OUTPATIENT)
Dept: INTERNAL MEDICINE CLINIC | Age: 77
End: 2022-07-11

## 2022-07-11 DIAGNOSIS — M25.551 HIP PAIN, RIGHT: Primary | ICD-10-CM

## 2022-07-11 NOTE — TELEPHONE ENCOUNTER
Called pt and left message. Call back number left and I myself or one of the other nurses will attempt to contact again. The call was to inform pt of the PCP's note.

## 2022-07-11 NOTE — TELEPHONE ENCOUNTER
Call received from Corby cabrera at [facility] imaging, states the patient was there for some imaging but states she also wanted to have her (L) knee and (R) hip x-rayed. States this was discussed at her last OV with you. Informed he I could not pull you out of a room to add ordered but would put the request in. Patient states if you weren't going to do it right now she was going to the ER.

## 2022-07-11 NOTE — PROGRESS NOTES
Orders Placed This Encounter    XR HIP RT W OR WO PELV 2-3 VWS     Standing Status:   Future     Standing Expiration Date:   1/11/2023

## 2022-07-11 NOTE — TELEPHONE ENCOUNTER
Ms. Andrew Villaseñor called back and asked for the hip x-ray to be sent to [facility]. I faxed the order.
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
## 2022-08-16 ENCOUNTER — OFFICE VISIT (OUTPATIENT)
Dept: INTERNAL MEDICINE CLINIC | Age: 77
End: 2022-08-16
Payer: MEDICARE

## 2022-08-16 VITALS
OXYGEN SATURATION: 98 % | DIASTOLIC BLOOD PRESSURE: 72 MMHG | WEIGHT: 170.8 LBS | RESPIRATION RATE: 16 BRPM | HEART RATE: 74 BPM | BODY MASS INDEX: 25.88 KG/M2 | HEIGHT: 68 IN | SYSTOLIC BLOOD PRESSURE: 126 MMHG | TEMPERATURE: 97 F

## 2022-08-16 DIAGNOSIS — M25.569 ACUTE KNEE PAIN, UNSPECIFIED LATERALITY: ICD-10-CM

## 2022-08-16 DIAGNOSIS — Z00.00 MEDICARE ANNUAL WELLNESS VISIT, SUBSEQUENT: Primary | ICD-10-CM

## 2022-08-16 DIAGNOSIS — E78.5 DYSLIPIDEMIA: ICD-10-CM

## 2022-08-16 DIAGNOSIS — M25.559 HIP PAIN: ICD-10-CM

## 2022-08-16 DIAGNOSIS — E11.40 TYPE 2 DIABETES MELLITUS WITH DIABETIC NEUROPATHY, WITHOUT LONG-TERM CURRENT USE OF INSULIN (HCC): ICD-10-CM

## 2022-08-16 DIAGNOSIS — M54.2 NECK PAIN: ICD-10-CM

## 2022-08-16 PROCEDURE — G0439 PPPS, SUBSEQ VISIT: HCPCS | Performed by: INTERNAL MEDICINE

## 2022-08-16 PROCEDURE — 99213 OFFICE O/P EST LOW 20 MIN: CPT | Performed by: INTERNAL MEDICINE

## 2022-08-16 PROCEDURE — G8417 CALC BMI ABV UP PARAM F/U: HCPCS | Performed by: INTERNAL MEDICINE

## 2022-08-16 PROCEDURE — G8510 SCR DEP NEG, NO PLAN REQD: HCPCS | Performed by: INTERNAL MEDICINE

## 2022-08-16 PROCEDURE — G8399 PT W/DXA RESULTS DOCUMENT: HCPCS | Performed by: INTERNAL MEDICINE

## 2022-08-16 PROCEDURE — 1101F PT FALLS ASSESS-DOCD LE1/YR: CPT | Performed by: INTERNAL MEDICINE

## 2022-08-16 PROCEDURE — G8427 DOCREV CUR MEDS BY ELIG CLIN: HCPCS | Performed by: INTERNAL MEDICINE

## 2022-08-16 PROCEDURE — 1123F ACP DISCUSS/DSCN MKR DOCD: CPT | Performed by: INTERNAL MEDICINE

## 2022-08-16 PROCEDURE — 3044F HG A1C LEVEL LT 7.0%: CPT | Performed by: INTERNAL MEDICINE

## 2022-08-16 PROCEDURE — 1090F PRES/ABSN URINE INCON ASSESS: CPT | Performed by: INTERNAL MEDICINE

## 2022-08-16 PROCEDURE — G8536 NO DOC ELDER MAL SCRN: HCPCS | Performed by: INTERNAL MEDICINE
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
## 2022-08-16 NOTE — PROGRESS NOTES
Reviewed record in preparation for visit and have obtained necessary documentation. Cheryle Sines is a 68 y.o.  female presents today for office visit for   Chief Complaint   Patient presents with    Hypertension    Diabetes    Cholesterol Problem    Annual Wellness Visit   . Pt is  fasting. Patient is accompanied by self. I have received verbal consent from Cheryle Sines to discuss any/all medical information while they are present in the room. Pt is in Room # 102 [facility] preferred language for health care discussion is english/other. Is the patient using any DME equipment during OV? YES    Advance Directive:  1. Do you have an advance directive in place? Patient Reply: NO    2. If not, would you like material regarding how to put one in place? NO      1. \"Have you been to the ER, urgent care clinic since your last visit? Hospitalized since your last visit? \" No    2. \"Have you seen or consulted any other health care providers outside of the [address] [place] [address] since your last visit? \" No     3. For patients aged 39-70: Has the patient had a colonoscopy? NA - based on age     If the patient is female:    4. For patients aged 41-77: Has the patient had a mammogram within the past 2 years? NA - based on age    11. For patients aged 21-65: Has the patient had a pap smear? NA - based on age    Upcoming Appts  No      Cheryle Sines is due for:   Health Maintenance Due   Topic    COVID-19 Vaccine (1)    Pneumococcal 65+ years (2 - PPSV23 or PCV20)    Eye Exam Retinal or Dilated      Health Maintenance reviewed and discussed per provider  Please order/place referral if appropriate.     Requested Prescriptions      No prescriptions requested or ordered in this encounter       Learning Assessment:  Learning Assessment 1/22/2015 11/21/2014 4/4/2014   PRIMARY LEARNER Patient Patient Patient   HIGHEST LEVEL OF EDUCATION - PRIMARY LEARNER  - GRADUATED HIGH SCHOOL OR GED - BARRIERS PRIMARY LEARNER NONE NONE -   PRIMARY LANGUAGE ENGLISH ENGLISH ENGLISH   LEARNER PREFERENCE PRIMARY READING LISTENING DEMONSTRATION     - READING -     - DEMONSTRATION -   ANSWERED BY pat Patient -   RELATIONSHIP SELF SELF -     Depression Screening:  3 most recent [facility] Screens 8/16/2022 7/7/2022 4/25/2022 3/14/2022 1/11/2022 11/24/2021 8/31/2021   PHQ Not Done - - - - - - -   Little interest or pleasure in doing things Not at all Not at all Not at all Not at all Not at all Not at all Not at all   Feeling down, depressed, irritable, or hopeless Not at all Not at all Several days Several days Not at all Not at all Not at all   Total Score PHQ 2 0 0 1 1 0 0 0   Trouble falling or staying asleep, or sleeping too much - - - - - - -   Feeling tired or having little energy - - - - - - -   Poor appetite, weight loss, or overeating - - - - - - -   Feeling bad about yourself - or that you are a failure or have let yourself or your family down - - - - - - -   Trouble concentrating on things such as school, work, reading, or watching TV - - - - - - -   Moving or speaking so slowly that other people could have noticed; or the opposite being so fidgety that others notice - - - - - - -   Thoughts of being better off dead, or hurting yourself in some way - - - - - - -   PHQ 9 Score - - - - - - -     Abuse Screening:  Abuse Screening Questionnaire 8/16/2022 8/11/2021 4/22/2019 7/31/2018 7/22/2015   Do you ever feel afraid of your partner? Woodrow TSE   Are you in a relationship with someone who physically or mentally threatens you? N N Y N N   Is it safe for you to go home? Terese MCCRACKEN     Fall Risk  Fall Risk Assessment, last 12 mths 8/16/2022 1/11/2022 8/31/2021 8/11/2021 8/3/2021 6/30/2021 4/1/2021   Able to walk? Yes Yes Yes Yes Yes Yes Yes   Fall in past 12 months? 0 0 0 0 0 1 0   Do you feel unsteady? 1 - - 0 0 0 -   Are you worried about falling 0 - - 0 0 0 -   Is TUG test greater than 12 seconds?  0 - - - - 0 -   Is the gait abnormal? 1 - - - - 1 -   Number of falls in past 12 months 0 - - - - 2 -   Fall with injury? - - - - - 0 -     Recent Travel Screening and Travel History documentation     Travel Screening     No screening recorded since 08/15/22 0000       Travel History   Travel since 07/16/22    No documented travel since 07/16/22

## 2022-08-16 NOTE — PATIENT INSTRUCTIONS

## 2022-08-17 ENCOUNTER — OFFICE VISIT (OUTPATIENT)
Dept: INTERNAL MEDICINE CLINIC | Age: 77
End: 2022-08-17
Payer: MEDICARE

## 2022-08-17 VITALS
TEMPERATURE: 97 F | RESPIRATION RATE: 16 BRPM | HEIGHT: 68 IN | WEIGHT: 170 LBS | OXYGEN SATURATION: 96 % | HEART RATE: 78 BPM | DIASTOLIC BLOOD PRESSURE: 78 MMHG | BODY MASS INDEX: 25.76 KG/M2 | SYSTOLIC BLOOD PRESSURE: 149 MMHG

## 2022-08-17 DIAGNOSIS — R22.31 MASS OF RIGHT AXILLA: Primary | ICD-10-CM

## 2022-08-17 DIAGNOSIS — R10.9 FLANK PAIN: ICD-10-CM

## 2022-08-17 PROCEDURE — G8510 SCR DEP NEG, NO PLAN REQD: HCPCS | Performed by: INTERNAL MEDICINE

## 2022-08-17 PROCEDURE — G8399 PT W/DXA RESULTS DOCUMENT: HCPCS | Performed by: INTERNAL MEDICINE

## 2022-08-17 PROCEDURE — 1123F ACP DISCUSS/DSCN MKR DOCD: CPT | Performed by: INTERNAL MEDICINE

## 2022-08-17 PROCEDURE — G8536 NO DOC ELDER MAL SCRN: HCPCS | Performed by: INTERNAL MEDICINE

## 2022-08-17 PROCEDURE — 99212 OFFICE O/P EST SF 10 MIN: CPT | Performed by: INTERNAL MEDICINE

## 2022-08-17 PROCEDURE — 1090F PRES/ABSN URINE INCON ASSESS: CPT | Performed by: INTERNAL MEDICINE

## 2022-08-17 PROCEDURE — 1101F PT FALLS ASSESS-DOCD LE1/YR: CPT | Performed by: INTERNAL MEDICINE

## 2022-08-17 PROCEDURE — G8417 CALC BMI ABV UP PARAM F/U: HCPCS | Performed by: INTERNAL MEDICINE

## 2022-08-17 PROCEDURE — G8427 DOCREV CUR MEDS BY ELIG CLIN: HCPCS | Performed by: INTERNAL MEDICINE

## 2022-08-17 NOTE — PROGRESS NOTES
HISTORY OF PRESENT ILLNESS  Haily Rogel is a 68 y.o. female. BP (!) 149/78 (BP 1 Location: Left upper arm, BP Patient Position: Sitting, BP Cuff Size: Adult)   Pulse 78   Temp 97 °F (36.1 °C) (Temporal)   Resp 16   Ht 5' 8\" (1.727 m)   Wt 170 lb (77.1 kg)   SpO2 96%   BMI 25.85 kg/m²     Has been having some soreness in her arm a few months. Has been doing a little bit of yard work and thought it was muscular. But yesterday when showering she noted a small lump in her armpit that is tender. She says it round and firm about dime size. Mass  The history is provided by the Patient. This is a new problem. Review of Systems   Constitutional:  Negative for chills and fever. Skin:  Negative for itching and rash. Physical Exam  Vitals and nursing note reviewed. Constitutional:       Appearance: Normal appearance. She is well-developed. Cardiovascular:      Rate and Rhythm: Normal rate. Pulmonary:      Effort: Pulmonary effort is normal.   Skin:     General: Skin is warm and dry. Neurological:      Mental Status: She is alert. Psychiatric:         Mood and Affect: Mood normal.         Behavior: Behavior normal.       ASSESSMENT and PLAN    ICD-10-CM ICD-9-CM    1. Mass of right axilla  R22.31 782.2       Nothing appreciated in right axilla today  No masses noted there or in tail of breast  Last Mammogram and left ultrasound in February were both negative  Suspect either a lymph node of muscle inflammation from the overuse.    F/u prn or as appointed

## 2022-08-17 NOTE — PROGRESS NOTES
1. \"Have you been to the ER, urgent care clinic since your last visit? Hospitalized since your last visit? \" No    2. \"Have you seen or consulted any other health care providers outside of the [address] [place] [address] since your last visit? \" No     3. For patients aged 39-70: Has the patient had a colonoscopy / FIT/ Cologuard? NA - based on age      If the patient is female:    4. For patients aged 41-77: Has the patient had a mammogram within the past 2 years? NA - based on age or sex      11. For patients aged 21-65: Has the patient had a pap smear?  NA - based on age or sex Statement Selected

## 2022-08-22 DIAGNOSIS — M54.2 NECK PAIN: ICD-10-CM

## 2022-08-29 ENCOUNTER — TELEPHONE (OUTPATIENT)
Dept: INTERNAL MEDICINE CLINIC | Age: 77
End: 2022-08-29

## 2022-08-30 NOTE — TELEPHONE ENCOUNTER
S/w the pt and made aware to the PCP. Pt states she assumed it was prescribed the PCP  but now thinks it was prescribed during a hospital visit. Chart reviewed: This medication was prescribed during the 8/14/22 ED visit. Pt states it was very effective treating the pain. Will notify.

## 2022-09-29 ENCOUNTER — OFFICE VISIT (OUTPATIENT)
Dept: ORTHOPEDIC SURGERY | Age: 77
End: 2022-09-29
Payer: MEDICARE

## 2022-09-29 DIAGNOSIS — G89.29 CHRONIC PAIN OF LEFT KNEE: Primary | ICD-10-CM

## 2022-09-29 DIAGNOSIS — M47.812 CERVICAL SPONDYLOSIS: ICD-10-CM

## 2022-09-29 DIAGNOSIS — M70.61 GREATER TROCHANTERIC BURSITIS OF RIGHT HIP: ICD-10-CM

## 2022-09-29 DIAGNOSIS — M47.816 LUMBAR SPONDYLOSIS: ICD-10-CM

## 2022-09-29 DIAGNOSIS — M25.562 CHRONIC PAIN OF LEFT KNEE: Primary | ICD-10-CM

## 2022-09-29 DIAGNOSIS — M47.812 FACET ARTHRITIS OF CERVICAL REGION: ICD-10-CM

## 2022-09-29 PROCEDURE — G8417 CALC BMI ABV UP PARAM F/U: HCPCS | Performed by: PHYSICAL MEDICINE & REHABILITATION

## 2022-09-29 PROCEDURE — 1101F PT FALLS ASSESS-DOCD LE1/YR: CPT | Performed by: PHYSICAL MEDICINE & REHABILITATION

## 2022-09-29 PROCEDURE — G8427 DOCREV CUR MEDS BY ELIG CLIN: HCPCS | Performed by: PHYSICAL MEDICINE & REHABILITATION

## 2022-09-29 PROCEDURE — G8536 NO DOC ELDER MAL SCRN: HCPCS | Performed by: PHYSICAL MEDICINE & REHABILITATION

## 2022-09-29 PROCEDURE — 99204 OFFICE O/P NEW MOD 45 MIN: CPT | Performed by: PHYSICAL MEDICINE & REHABILITATION

## 2022-09-29 PROCEDURE — 1090F PRES/ABSN URINE INCON ASSESS: CPT | Performed by: PHYSICAL MEDICINE & REHABILITATION

## 2022-09-29 PROCEDURE — G8432 DEP SCR NOT DOC, RNG: HCPCS | Performed by: PHYSICAL MEDICINE & REHABILITATION

## 2022-09-29 PROCEDURE — 1123F ACP DISCUSS/DSCN MKR DOCD: CPT | Performed by: PHYSICAL MEDICINE & REHABILITATION

## 2022-09-29 PROCEDURE — G8399 PT W/DXA RESULTS DOCUMENT: HCPCS | Performed by: PHYSICAL MEDICINE & REHABILITATION

## 2022-09-29 RX ORDER — DICLOFENAC SODIUM 30 MG/G
GEL TOPICAL 2 TIMES DAILY
Qty: 100 G | Refills: 0 | Status: SHIPPED | OUTPATIENT
Start: 2022-09-29 | End: 2022-10-03 | Stop reason: ALTCHOICE
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
## 2022-10-03 ENCOUNTER — TELEPHONE (OUTPATIENT)
Dept: ORTHOPEDIC SURGERY | Age: 77
End: 2022-10-03

## 2022-10-03 RX ORDER — DICLOFENAC SODIUM 10 MG/G
GEL TOPICAL 4 TIMES DAILY
Qty: 500 G | Refills: 3 | Status: SHIPPED | OUTPATIENT
Start: 2022-10-03 | End: 2022-11-02

## 2022-10-03 NOTE — TELEPHONE ENCOUNTER
Spoke to Dr. Jarad Guidry and inquired how much did she want the patient to use during each application. 2- 4 grams to affected area. Called the pharmacist Mr. Charisse Muniz [phone] and informed him 2-4 grams to affected area four times a day. He verbalized understanding and no further action is needed at this time.

## 2022-10-03 NOTE — TELEPHONE ENCOUNTER
Spoke to Dr. Cony Lopez and she stated patient is to use it on her knees/ bilaterally. [place] [id number] [phone] and spoke to the pharmacist Mr. Dumont and inquired about his concerns. He inquired about the diagnosis for the diclofenac 3 %. He stated the strength we sent in is used for actinic keratoses and the diclofenac 1 % is used for arthritis and pain. I informed him it was for bilat knee pain and I will check with the provider and make sure and I will call him. I spoke to Dr. Cony Lopez and she sent in a new prescription for the diclofenac 1%.

## 2022-10-03 NOTE — TELEPHONE ENCOUNTER
Pharmacist Mr. Dumont called back and needed to know how much of the cream will applied four times a day. He can be reached at [phone].

## 2022-10-03 NOTE — TELEPHONE ENCOUNTER
Pharmacist Mr. Dumont called for [facility]. Mr. Krissy Valentine called in regards to the Diclofenac 3% topical Gel. He would like to know what the patient is going to be using the Gel for. Mr. Krissy Valentine is requesting a call back with clarification.  tel. [phone].

## 2022-10-14 ENCOUNTER — OFFICE VISIT (OUTPATIENT)
Dept: INTERNAL MEDICINE CLINIC | Age: 77
End: 2022-10-14
Payer: MEDICARE

## 2022-10-14 VITALS
HEART RATE: 83 BPM | SYSTOLIC BLOOD PRESSURE: 125 MMHG | BODY MASS INDEX: 26.22 KG/M2 | OXYGEN SATURATION: 97 % | TEMPERATURE: 97 F | DIASTOLIC BLOOD PRESSURE: 64 MMHG | WEIGHT: 173 LBS | HEIGHT: 68 IN

## 2022-10-14 DIAGNOSIS — Z78.0 ASYMPTOMATIC MENOPAUSAL STATE: ICD-10-CM

## 2022-10-14 DIAGNOSIS — N64.4 BREAST TENDERNESS IN FEMALE: Primary | ICD-10-CM

## 2022-10-14 PROCEDURE — 1101F PT FALLS ASSESS-DOCD LE1/YR: CPT | Performed by: INTERNAL MEDICINE

## 2022-10-14 PROCEDURE — G8536 NO DOC ELDER MAL SCRN: HCPCS | Performed by: INTERNAL MEDICINE

## 2022-10-14 PROCEDURE — G8427 DOCREV CUR MEDS BY ELIG CLIN: HCPCS | Performed by: INTERNAL MEDICINE

## 2022-10-14 PROCEDURE — 1123F ACP DISCUSS/DSCN MKR DOCD: CPT | Performed by: INTERNAL MEDICINE

## 2022-10-14 PROCEDURE — G8417 CALC BMI ABV UP PARAM F/U: HCPCS | Performed by: INTERNAL MEDICINE

## 2022-10-14 PROCEDURE — G8399 PT W/DXA RESULTS DOCUMENT: HCPCS | Performed by: INTERNAL MEDICINE

## 2022-10-14 PROCEDURE — G8432 DEP SCR NOT DOC, RNG: HCPCS | Performed by: INTERNAL MEDICINE

## 2022-10-14 PROCEDURE — 1090F PRES/ABSN URINE INCON ASSESS: CPT | Performed by: INTERNAL MEDICINE

## 2022-10-14 PROCEDURE — 99213 OFFICE O/P EST LOW 20 MIN: CPT | Performed by: INTERNAL MEDICINE

## 2022-10-14 NOTE — PROGRESS NOTES
Andrés Maloney is a 68 y.o. female (: 1945) presenting to address:    No chief complaint on file. Vitals:    10/14/22 0839   Temp: 97 °F (36.1 °C)   Weight: 173 lb (78.5 kg)   Height: 5' 8\" (1.727 m)   PainSc:   9       Hearing/Vision:   No results found. Learning Assessment:     Learning Assessment 2015   PRIMARY LEARNER Patient   HIGHEST LEVEL OF EDUCATION - PRIMARY LEARNER  -   BARRIERS PRIMARY LEARNER NONE   PRIMARY LANGUAGE ENGLISH   LEARNER PREFERENCE PRIMARY READING     -     -   ANSWERED BY pat   RELATIONSHIP SELF     Depression Screening:     3 most recent PHQ Screens 10/14/2022   PHQ Not Done -   Little interest or pleasure in doing things Nearly every day   Feeling down, depressed, irritable, or hopeless Nearly every day   Total Score PHQ 2 6   Trouble falling or staying asleep, or sleeping too much Not at all   Feeling tired or having little energy Not at all   Poor appetite, weight loss, or overeating Not at all   Feeling bad about yourself - or that you are a failure or have let yourself or your family down Not at all   Trouble concentrating on things such as school, work, reading, or watching TV Not at all   Moving or speaking so slowly that other people could have noticed; or the opposite being so fidgety that others notice Not at all   Thoughts of being better off dead, or hurting yourself in some way Not at all   PHQ 9 Score 6   How difficult have these problems made it for you to do your work, take care of your home and get along with others Not difficult at all     Fall Risk Assessment:     Fall Risk Assessment, last 12 mths 10/14/2022   Able to walk? Yes   Fall in past 12 months? 0   Do you feel unsteady? 0   Are you worried about falling 0   Is TUG test greater than 12 seconds? -   Is the gait abnormal? -   Number of falls in past 12 months -   Fall with injury?  -     Abuse Screening:     Abuse Screening Questionnaire 2022   Do you ever feel afraid of your partner? N   Are you in a relationship with someone who physically or mentally threatens you? N   Is it safe for you to go home? Y     ADL Assessment:     ADL Assessment 8/16/2022   Feeding yourself No Help Needed   Getting from bed to chair No Help Needed   Getting dressed No Help Needed   Bathing or showering No Help Needed   Walk across the room (includes cane/walker) Help Needed   Using the telphone No Help Needed   Taking your medications No Help Needed   Preparing meals No Help Needed   Managing money (expenses/bills) No Help Needed   Moderately strenuous housework (laundry) Help Needed   Shopping for personal items (toiletries/medicines) No Help Needed   Shopping for groceries No Help Needed   Driving No Help Needed   Climbing a flight of stairs No Help Needed   Getting to places beyond walking distances No Help Needed        Coordination of Care Questionaire:   1. \"Have you been to the ER, urgent care clinic since your last visit? Hospitalized since your last visit? \" No    2. \"Have you seen or consulted any other health care providers outside of the [address] [place] [address] [place] since your last visit? \" No     3. For patients aged 39-70: Has the patient had a colonoscopy / FIT/ Cologuard? NA - based on age      If the patient is female:    4. For patients aged 41-77: Has the patient had a mammogram within the past 2 years? NA - based on age or sex  See top three    5. For patients aged 21-65: Has the patient had a pap smear? NA - based on age or sex    Advanced Directive:   1. Do you have an Advanced Directive? NO    2. Would you like information on Advanced Directives?  NO

## 2022-10-14 NOTE — PROGRESS NOTES
HISTORY OF PRESENT ILLNESS  Abner Batres is a 68 y.o. female. /64 (BP 1 Location: Left upper arm, BP Patient Position: Sitting, BP Cuff Size: Adult)   Pulse 83   Temp 97 °F (36.1 °C)   Ht 5' 8\" (1.727 m)   Wt 173 lb (78.5 kg)   SpO2 97%   BMI 26.30 kg/m²     Recently noted a pea-size tenderness in her left breast  Upper, outer breast  She has known fibrocystic disease. Had a mammogram and u/s in February of this year. Breast Mass  The history is provided by the Patient. This is a new problem. The current episode started more than 2 days ago. Pertinent negatives include no chest pain and no shortness of breath. Review of Systems   Constitutional:  Negative for chills and fever. Respiratory:  Negative for shortness of breath. Cardiovascular:  Negative for chest pain. Physical Exam  Vitals and nursing note reviewed. Constitutional:       Appearance: Normal appearance. She is well-developed. HENT:      Head: Normocephalic and atraumatic. Cardiovascular:      Rate and Rhythm: Normal rate. Pulmonary:      Effort: Pulmonary effort is normal.   Chest:       Skin:     General: Skin is warm and dry. Neurological:      General: No focal deficit present. Mental Status: She is alert and oriented to person, place, and time. Psychiatric:         Mood and Affect: Mood normal.         Behavior: Behavior normal.       ASSESSMENT and PLAN    ICD-10-CM ICD-9-CM    1. Breast tenderness in female  N64.4 611.71 JOMAR MAMMO LT DX INCL CAD      US BREAST LT LIMITED=<3 QUAD      2. Asymptomatic menopausal state  Z78.0 V49.81 DEXA BONE DENSITY STUDY AXIAL          Discussed with patient. Will update left diagnostic mammogram and ultrasound  Consider MRI given breast density  Consider referral to Dr. Mortimer Primer if sxs continue  F/u as appointed.

## 2022-10-27 ENCOUNTER — OFFICE VISIT (OUTPATIENT)
Dept: ORTHOPEDIC SURGERY | Age: 77
End: 2022-10-27
Payer: MEDICARE

## 2022-10-27 VITALS
HEIGHT: 68 IN | HEART RATE: 85 BPM | OXYGEN SATURATION: 98 % | TEMPERATURE: 97.6 F | WEIGHT: 170 LBS | BODY MASS INDEX: 25.76 KG/M2

## 2022-10-27 DIAGNOSIS — M47.816 LUMBAR SPONDYLOSIS: ICD-10-CM

## 2022-10-27 DIAGNOSIS — M47.812 CERVICAL SPONDYLOSIS: ICD-10-CM

## 2022-10-27 DIAGNOSIS — M70.61 GREATER TROCHANTERIC BURSITIS OF RIGHT HIP: ICD-10-CM

## 2022-10-27 DIAGNOSIS — G89.29 CHRONIC PAIN OF LEFT KNEE: Primary | ICD-10-CM

## 2022-10-27 DIAGNOSIS — M47.812 FACET ARTHRITIS OF CERVICAL REGION: ICD-10-CM

## 2022-10-27 DIAGNOSIS — M25.562 CHRONIC PAIN OF LEFT KNEE: Primary | ICD-10-CM

## 2022-10-27 PROCEDURE — 1101F PT FALLS ASSESS-DOCD LE1/YR: CPT | Performed by: PHYSICAL MEDICINE & REHABILITATION

## 2022-10-27 PROCEDURE — 99212 OFFICE O/P EST SF 10 MIN: CPT | Performed by: PHYSICAL MEDICINE & REHABILITATION

## 2022-10-27 PROCEDURE — G8536 NO DOC ELDER MAL SCRN: HCPCS | Performed by: PHYSICAL MEDICINE & REHABILITATION

## 2022-10-27 PROCEDURE — 1123F ACP DISCUSS/DSCN MKR DOCD: CPT | Performed by: PHYSICAL MEDICINE & REHABILITATION

## 2022-10-27 PROCEDURE — G8427 DOCREV CUR MEDS BY ELIG CLIN: HCPCS | Performed by: PHYSICAL MEDICINE & REHABILITATION

## 2022-10-27 PROCEDURE — G8417 CALC BMI ABV UP PARAM F/U: HCPCS | Performed by: PHYSICAL MEDICINE & REHABILITATION

## 2022-10-27 PROCEDURE — G8399 PT W/DXA RESULTS DOCUMENT: HCPCS | Performed by: PHYSICAL MEDICINE & REHABILITATION

## 2022-10-27 PROCEDURE — 1090F PRES/ABSN URINE INCON ASSESS: CPT | Performed by: PHYSICAL MEDICINE & REHABILITATION

## 2022-10-27 PROCEDURE — G8432 DEP SCR NOT DOC, RNG: HCPCS | Performed by: PHYSICAL MEDICINE & REHABILITATION
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
## 2022-11-03 ENCOUNTER — HOSPITAL ENCOUNTER (OUTPATIENT)
Dept: WOMENS IMAGING | Age: 77
Discharge: HOME | End: 2022-11-03
Attending: INTERNAL MEDICINE
Payer: MEDICARE

## 2022-11-03 ENCOUNTER — HOSPITAL ENCOUNTER (OUTPATIENT)
Dept: ULTRASOUND IMAGING | Age: 77
Discharge: HOME | End: 2022-11-03
Attending: INTERNAL MEDICINE
Payer: MEDICARE

## 2022-11-03 ENCOUNTER — HOSPITAL ENCOUNTER (OUTPATIENT)
Dept: BONE DENSITY | Age: 77
Discharge: HOME | End: 2022-11-03
Attending: INTERNAL MEDICINE
Payer: MEDICARE

## 2022-11-03 ENCOUNTER — HOSPITAL ENCOUNTER (OUTPATIENT)
Dept: ULTRASOUND IMAGING | Age: 77
End: 2022-11-03
Attending: INTERNAL MEDICINE
Payer: MEDICARE

## 2022-11-03 DIAGNOSIS — N64.4 BREAST TENDERNESS IN FEMALE: ICD-10-CM

## 2022-11-03 DIAGNOSIS — Z78.0 ASYMPTOMATIC MENOPAUSAL STATE: ICD-10-CM

## 2022-11-03 PROCEDURE — 76642 ULTRASOUND BREAST LIMITED: CPT

## 2022-11-03 PROCEDURE — 77062 BREAST TOMOSYNTHESIS BI: CPT

## 2022-11-03 PROCEDURE — 77080 DXA BONE DENSITY AXIAL: CPT

## 2022-12-08 ENCOUNTER — TELEPHONE (OUTPATIENT)
Dept: INTERNAL MEDICINE CLINIC | Age: 77
End: 2022-12-08

## 2022-12-08 DIAGNOSIS — N64.4 BREAST PAIN: Primary | ICD-10-CM

## 2022-12-08 NOTE — TELEPHONE ENCOUNTER
Call received from [facility]. States patient is there now for her Diagnostic mammogram but is not stating she is having bilateral breast pain. They are in need of a new order for a Bilateral Diagnostic mammogram and Bilateral US faxed to [phone]. Patient is currently there now waiting.

## 2022-12-08 NOTE — PROGRESS NOTES
Orders Placed This Encounter    JOMAR MAMMO BI DX INCL CAD     Standing Status:   Future     Standing Expiration Date:   1/8/2024     Scheduling Instructions:      DONNA    US BREASTS LIMITED=<3 QUAD (Bilateral)     Standing Status:   Future     Standing Expiration Date:   6/8/2023     Scheduling Instructions:      DONNA     Pt presented for f/u mammogram and u/s and noted bilateral breast pain.   Order modified

## 2023-01-05 ENCOUNTER — OFFICE VISIT (OUTPATIENT)
Dept: INTERNAL MEDICINE CLINIC | Age: 78
End: 2023-01-05
Payer: MEDICARE

## 2023-01-05 VITALS
RESPIRATION RATE: 17 BRPM | BODY MASS INDEX: 26.07 KG/M2 | WEIGHT: 172 LBS | HEIGHT: 68 IN | OXYGEN SATURATION: 97 % | SYSTOLIC BLOOD PRESSURE: 167 MMHG | HEART RATE: 68 BPM | DIASTOLIC BLOOD PRESSURE: 78 MMHG

## 2023-01-05 DIAGNOSIS — R35.0 URINARY FREQUENCY: ICD-10-CM

## 2023-01-05 DIAGNOSIS — R19.5 DARK STOOLS: Primary | ICD-10-CM

## 2023-01-05 DIAGNOSIS — E11.40 TYPE 2 DIABETES MELLITUS WITH DIABETIC NEUROPATHY, UNSPECIFIED WHETHER LONG TERM INSULIN USE (HCC): ICD-10-CM

## 2023-01-05 DIAGNOSIS — R10.13 EPIGASTRIC PAIN: ICD-10-CM

## 2023-01-05 PROCEDURE — 1101F PT FALLS ASSESS-DOCD LE1/YR: CPT | Performed by: INTERNAL MEDICINE

## 2023-01-05 PROCEDURE — 1090F PRES/ABSN URINE INCON ASSESS: CPT | Performed by: INTERNAL MEDICINE

## 2023-01-05 PROCEDURE — G8399 PT W/DXA RESULTS DOCUMENT: HCPCS | Performed by: INTERNAL MEDICINE

## 2023-01-05 PROCEDURE — G8432 DEP SCR NOT DOC, RNG: HCPCS | Performed by: INTERNAL MEDICINE

## 2023-01-05 PROCEDURE — G8417 CALC BMI ABV UP PARAM F/U: HCPCS | Performed by: INTERNAL MEDICINE

## 2023-01-05 PROCEDURE — G8427 DOCREV CUR MEDS BY ELIG CLIN: HCPCS | Performed by: INTERNAL MEDICINE

## 2023-01-05 PROCEDURE — G8536 NO DOC ELDER MAL SCRN: HCPCS | Performed by: INTERNAL MEDICINE

## 2023-01-05 PROCEDURE — 1123F ACP DISCUSS/DSCN MKR DOCD: CPT | Performed by: INTERNAL MEDICINE

## 2023-01-05 PROCEDURE — 99214 OFFICE O/P EST MOD 30 MIN: CPT | Performed by: INTERNAL MEDICINE

## 2023-01-05 RX ORDER — OMEPRAZOLE 40 MG/1
40 CAPSULE, DELAYED RELEASE ORAL DAILY
Qty: 60 CAPSULE | Refills: 2 | Status: SHIPPED | OUTPATIENT
Start: 2023-01-05

## 2023-01-05 NOTE — PROGRESS NOTES
1. \"Have you been to the ER, urgent care clinic since your last visit? Hospitalized since your last visit? \" No    2. \"Have you seen or consulted any other health care providers outside of the [address] [place] [address] since your last visit? \" No     3. For patients aged 39-70: Has the patient had a colonoscopy / FIT/ Cologuard? NA - based on age      If the patient is female:    4. For patients aged 41-77: Has the patient had a mammogram within the past 2 years? NA - based on age or sex      11. For patients aged 21-65: Has the patient had a pap smear?  NA - based on age or sex

## 2023-01-05 NOTE — PROGRESS NOTES
HISTORY OF PRESENT ILLNESS  Laurie Mayfield is a 68 y.o. female. BP (!) 167/78 (BP 1 Location: Left upper arm, BP Patient Position: Sitting, BP Cuff Size: Adult)   Pulse 68   Resp 17   Ht 5' 8\" (1.727 m)   Wt 172 lb (78 kg)   SpO2 97%   BMI 26.15 kg/m²     This morning noted some a small amount of black stool in her underwear. Then she went to the bathroom and had an actual dark stool  No abdominal pain but has some bloating  No nausea but she has chronic GERD sxs. Has a lot of burning. Last colonoscopy (and endoscopy) in 2019. No recent ibuprofen or aleve. Rare aspirin  No recent change in diet. No greens for example  No iron tabs  No pepto bismol recently          Current Outpatient Medications:   ·  simvastatin (ZOCOR) 20 mg tablet, TAKE 1 TABLET BY MOUTH ONCE DAILY AT BEDTIME, Disp: 90 Tablet, Rfl: 1  ·    ·  VITAMIN B COMPLEX-100 IJ, as directed., Disp: , Rfl:   ·  fish oil-omega-3 fatty acids 340-1,000 mg capsule, 1 cap, Disp: , Rfl:   ·  rOPINIRole (REQUIP) 0.5 mg tablet, Take 1 Tablet by mouth two (2) times daily as needed (restless syndrome). Indications: restless legs syndrome, an extreme discomfort in the calf muscles when sitting or lying down, Disp: 180 Tablet, Rfl: 5  ·  estradioL (CLIMARA) 0.1 mg/24 hr, 1 Patch by TransDERmal route every seven (7) days. , Disp: 4 Patch, Rfl: 11  ·  ergocalciferol (ERGOCALCIFEROL) 1,250 mcg (50,000 unit) capsule, Take 1 capsule by mouth once a week, Disp: 12 Capsule, Rfl: 3  ·  omeprazole (PRILOSEC) 40 mg capsule, Take 1 Capsule by mouth daily. , Disp: 60 Capsule, Rfl: 2  ·  multivitamin (ONE A DAY) tablet, Take 1 Tab by mouth daily. , Disp: , Rfl:     ·  albuterol (PROVENTIL HFA, VENTOLIN HFA, PROAIR HFA) 90 mcg/actuation inhaler, INHALE 2 PUFFS BY MOUTH EVERY 6 HOURS AS NEEDED FOR WHEEZING FOR SHORTNESS OF BREATH (Patient not taking: No sig reported), Disp: 1 Each, Rfl: 11  ·  fluticasone (FLONASE) 50 mcg/actuation nasal spray, 2 Sprays by Both Nostrils route daily. Indications: ALLERGIC RHINITIS (Patient taking differently: 2 Sprays by Both Nostrils route daily as needed for Rhinitis or Allergies. Indications: inflammation of the nose due to an allergy), Disp: 1 Bottle, Rfl: 5  ·       Stool Color Change  The history is provided by the Patient. This is a new problem. Bowel Incontinence      Review of Systems   Gastrointestinal:         Dark black stool   Genitourinary:  Positive for frequency. Physical Exam  Vitals and nursing note reviewed. Constitutional:       Appearance: Normal appearance. She is well-developed. HENT:      Head: Normocephalic and atraumatic. Cardiovascular:      Rate and Rhythm: Normal rate and regular rhythm. Pulmonary:      Effort: Pulmonary effort is normal.      Breath sounds: Normal breath sounds. Abdominal:      General: There is no distension. Palpations: There is no mass. Tenderness: There is no abdominal tenderness. There is no guarding. Musculoskeletal:      Right lower leg: No edema. Left lower leg: No edema. Skin:     General: Skin is warm and dry. Neurological:      General: No focal deficit present. Mental Status: She is alert and oriented to person, place, and time. Psychiatric:         Mood and Affect: Mood normal.         Behavior: Behavior normal.       ASSESSMENT and PLAN    ICD-10-CM ICD-9-CM    1. Dark stools  H65.1 450.0 METABOLIC PANEL, BASIC      CBC WITH AUTOMATED DIFF      OCCULT BLOOD IMMUNOASSAY,DIAGNOSTIC      2. Type 2 diabetes mellitus with diabetic neuropathy, unspecified whether long term insulin use ([facility])  E39.42 981.38 METABOLIC PANEL, BASIC     357.2 HEMOGLOBIN A1C W/O EAG      LIPID PANEL      3.  Urinary frequency  F06.8 815.05 METABOLIC PANEL, BASIC      URINALYSIS W/ RFLX MICROSCOPIC      CULTURE, URINE      REFERRAL TO UROLOGY      4. Epigastric pain  R10.13 789.06 omeprazole (PRILOSEC) 40 mg capsule        Given up-to-date on colonoscopy, unlikely a tumor. No other sxs to suggest inflammatory bowel disease. ?PUD  Check lab including stool sample. Restart omeprazole  Reviewed last endoscopy report.   F/u 7-10 days

## 2023-01-08 LAB
APPEARANCE UR: CLEAR
BACTERIA UR CULT: NORMAL
BASOPHILS # BLD AUTO: 0.1 X10E3/UL (ref 0–0.2)
BASOPHILS NFR BLD AUTO: 1 %
BILIRUB UR QL STRIP: NEGATIVE
BUN SERPL-MCNC: 17 MG/DL (ref 8–27)
BUN/CREAT SERPL: 24 (ref 12–28)
CALCIUM SERPL-MCNC: 9.9 MG/DL (ref 8.7–10.3)
CHLORIDE SERPL-SCNC: 102 MMOL/L (ref 96–106)
CHOLEST SERPL-MCNC: 223 MG/DL (ref 100–199)
CO2 SERPL-SCNC: 24 MMOL/L (ref 20–29)
COLOR UR: YELLOW
CREAT SERPL-MCNC: 0.72 MG/DL (ref 0.57–1)
EGFR: 86 ML/MIN/1.73
EOSINOPHIL # BLD AUTO: 0.1 X10E3/UL (ref 0–0.4)
EOSINOPHIL NFR BLD AUTO: 2 %
ERYTHROCYTE [DISTWIDTH] IN BLOOD BY AUTOMATED COUNT: 12.8 % (ref 11.7–15.4)
GLUCOSE SERPL-MCNC: 89 MG/DL (ref 70–99)
GLUCOSE UR QL STRIP: NEGATIVE
HBA1C MFR BLD: 6.8 % (ref 4.8–5.6)
HCT VFR BLD AUTO: 45.2 % (ref 34–46.6)
HDLC SERPL-MCNC: 55 MG/DL
HGB BLD-MCNC: 15 G/DL (ref 11.1–15.9)
HGB UR QL STRIP: NEGATIVE
IMM GRANULOCYTES # BLD AUTO: 0 X10E3/UL (ref 0–0.1)
IMM GRANULOCYTES NFR BLD AUTO: 0 %
IMP & REVIEW OF LAB RESULTS: NORMAL
KETONES UR QL STRIP: NEGATIVE
LDLC SERPL CALC-MCNC: 137 MG/DL (ref 0–99)
LEUKOCYTE ESTERASE UR QL STRIP: NEGATIVE
LYMPHOCYTES # BLD AUTO: 2.2 X10E3/UL (ref 0.7–3.1)
LYMPHOCYTES NFR BLD AUTO: 38 %
MCH RBC QN AUTO: 31.3 PG (ref 26.6–33)
MCHC RBC AUTO-ENTMCNC: 33.2 G/DL (ref 31.5–35.7)
MCV RBC AUTO: 94 FL (ref 79–97)
MICRO URNS: NORMAL
MONOCYTES # BLD AUTO: 0.5 X10E3/UL (ref 0.1–0.9)
MONOCYTES NFR BLD AUTO: 9 %
NEUTROPHILS # BLD AUTO: 3 X10E3/UL (ref 1.4–7)
NEUTROPHILS NFR BLD AUTO: 50 %
NITRITE UR QL STRIP: NEGATIVE
PH UR STRIP: 5.5 [PH] (ref 5–7.5)
PLATELET # BLD AUTO: 260 X10E3/UL (ref 150–450)
POTASSIUM SERPL-SCNC: 4.5 MMOL/L (ref 3.5–5.2)
PROT UR QL STRIP: NEGATIVE
RBC # BLD AUTO: 4.8 X10E6/UL (ref 3.77–5.28)
SODIUM SERPL-SCNC: 141 MMOL/L (ref 134–144)
SP GR UR STRIP: 1.02 (ref 1–1.03)
TRIGL SERPL-MCNC: 177 MG/DL (ref 0–149)
UROBILINOGEN UR STRIP-MCNC: 0.2 MG/DL (ref 0.2–1)
VLDLC SERPL CALC-MCNC: 31 MG/DL (ref 5–40)
WBC # BLD AUTO: 5.9 X10E3/UL (ref 3.4–10.8)

## 2023-02-13 DIAGNOSIS — R35.0 URINARY FREQUENCY: Primary | ICD-10-CM

## 2023-02-21 ENCOUNTER — OFFICE VISIT (OUTPATIENT)
Age: 78
End: 2023-02-21

## 2023-02-21 VITALS — WEIGHT: 165 LBS | BODY MASS INDEX: 25.01 KG/M2 | HEIGHT: 68 IN

## 2023-02-21 DIAGNOSIS — M25.562 CHRONIC PAIN OF LEFT KNEE: ICD-10-CM

## 2023-02-21 DIAGNOSIS — M70.61 GREATER TROCHANTERIC BURSITIS OF RIGHT HIP: Primary | ICD-10-CM

## 2023-02-21 DIAGNOSIS — G89.29 CHRONIC PAIN OF LEFT KNEE: ICD-10-CM

## 2023-02-21 RX ORDER — ROPIVACAINE HYDROCHLORIDE 5 MG/ML
3 INJECTION, SOLUTION EPIDURAL; INFILTRATION; PERINEURAL ONCE
Status: COMPLETED | OUTPATIENT
Start: 2023-02-21 | End: 2023-02-21

## 2023-02-21 RX ORDER — TRIAMCINOLONE ACETONIDE 40 MG/ML
40 INJECTION, SUSPENSION INTRA-ARTICULAR; INTRAMUSCULAR ONCE
Status: COMPLETED | OUTPATIENT
Start: 2023-02-21 | End: 2023-02-21

## 2023-02-21 RX ADMIN — TRIAMCINOLONE ACETONIDE 40 MG: 40 INJECTION, SUSPENSION INTRA-ARTICULAR; INTRAMUSCULAR at 10:57

## 2023-02-21 RX ADMIN — ROPIVACAINE HYDROCHLORIDE 3 ML: 5 INJECTION, SOLUTION EPIDURAL; INFILTRATION; PERINEURAL at 10:57
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
## 2023-02-21 NOTE — LETTER
NAME: Mery Lewis  : 1945  MRN: [id number]    PROCEDURAL INFORMED CONSENT FOR OPERATION / PROCEDURE     1. I (we),      Mery Lewis      authorize    Lorri Olivas MD       and/or such assistants as may be selected by him/her, to perform the following operation/procedures    Right greater trochanteric bursa injection ultrasound guided     Note: If unable to obtain consent prior to an emergent procedure, document the emergent reason in the medical record. This procedure has been explained to my (our) satisfaction and included in the explanation was:   A) the intended benefit, nature, and extent of the procedure to be performed;  B) the significant risks involved and the probability of success;  C) alternative procedures and methods of treatment;  D) the dangers and probable consequences of such alternatives (including no procedure or treatment); E) the expected consequences of the procedure on my future health;  F) whether other qualified individuals would be performing important surgical tasks and / or whether  would be present to advise or support the procedure. I (we) understand that there are other risks of infection and other serious complications in the pre-operative/procedural and postoperative/procedural stages of my (our) care. I (we) have asked all of the questions which I (we) thought were important in deciding whether or not to undergo treatment or diagnosis. These questions have been answered to my (our) satisfaction. I (we) understand that no assurance can be given that the procedure will be a success, and no guarantee or warranty of success has been given to me (us). 2.  It has been explained to me (us) that during the course of the operation/procedure, unforeseen conditions may be revealed that necessitate extension of the original procedure(s) or different procedure(s) than those set forth in Paragraph 1.  I (we) authorize and request that the above-named physician, his/her assistants or his/her designees, perform procedures as necessary and desirable if deemed to be in my (our) best interest.    3.  I acknowledge that other health care personnel may be observing this procedure for the purpose of medical education or other specified purposes as may be necessary as requested and/or approved by my (our) physician. 4.  I (we) consent to the disposal by the hospital Pathologist of the removed tissue, parts or organs in accordance with hospital policy. Page 1 of 2    NAME: Codie Maciel  : 1945  MRN: [id number]    3. I do_____ do not______ consent to the use of a local infiltration pain blocking agent that will be used by my provider/surgical provider to help alleviate pain during my procedure. 6.  I do_____ do not_____ consent to an emergent blood transfusion in the case of a life-threatening situation that requires blood components to be administered. This consent is valid for 24 hours from the beginning of the procedure. 7.  This patient does _____ or does not ______currently have a DNR status/order. If DNR order is in place, obtain Addendum to the Surgical Consent for ALL Patients with a DNR Order to address jd-operative status for limited intervention or DNR suspension. 8.  I have read and fully understand the above Consent for Operation/Procedure and that all blanks were completed before I signed the consent.       Codie Maciel     Signature of Patient (or legal representative)  Printed Name / Donald Jorge                2023 /         AM / PM   Witness to Signature  Printed Name   Date/Time        (If patient is unable to sign or is a minor, complete the following)               Patient is a minor, ____years of age, or unable to sign because: _____________________          ________________________________________________________________________  Magdaleno May If a phone consent is obtained, consent will be documented by using two health care professionals, each affirming that the consenting party   has no questions and gives consent for the procedure discussed with the physician/provider. 2/21/2023 /               AM / PM   2nd Witness to phone consent  Printed Name   Date/Time     Informed Consent:  I have provided the explanation described above in section 1 to the patient and/or legal representative. I have provided the patient and/or legal representative with an opportunity to ask any questions about the proposed operation/procedure. Rey Parisi MD    2/21/2023   /            AM / PM   Provider / Proceduralist  Printed Name  Date/Time     This Provider / Hussein File performing the surgery is ONLY for Office-based procedures in [place]   [ x] Board certified or Board eligible by one of the [employer], the [employer] [place] [employer], the [employer] of Podiatric Medicine, the [employer] of Foot and Ankle Surgery, or other board as approved by the hospital for medical staff appointment.             Page 2 of 2  Revised 8/2/2021

## 2023-02-28 ENCOUNTER — OFFICE VISIT (OUTPATIENT)
Facility: CLINIC | Age: 78
End: 2023-02-28
Payer: MEDICARE

## 2023-02-28 VITALS
OXYGEN SATURATION: 96 % | HEIGHT: 68 IN | TEMPERATURE: 98.1 F | WEIGHT: 168 LBS | DIASTOLIC BLOOD PRESSURE: 76 MMHG | SYSTOLIC BLOOD PRESSURE: 178 MMHG | BODY MASS INDEX: 25.46 KG/M2 | HEART RATE: 74 BPM | RESPIRATION RATE: 17 BRPM

## 2023-02-28 DIAGNOSIS — R19.5 DARK STOOLS: ICD-10-CM

## 2023-02-28 DIAGNOSIS — K21.9 GASTROESOPHAGEAL REFLUX DISEASE, UNSPECIFIED WHETHER ESOPHAGITIS PRESENT: Primary | ICD-10-CM

## 2023-02-28 DIAGNOSIS — R15.9 INCONTINENCE OF FECES, UNSPECIFIED FECAL INCONTINENCE TYPE: ICD-10-CM

## 2023-02-28 PROCEDURE — G8417 CALC BMI ABV UP PARAM F/U: HCPCS | Performed by: INTERNAL MEDICINE

## 2023-02-28 PROCEDURE — 1090F PRES/ABSN URINE INCON ASSESS: CPT | Performed by: INTERNAL MEDICINE

## 2023-02-28 PROCEDURE — G8427 DOCREV CUR MEDS BY ELIG CLIN: HCPCS | Performed by: INTERNAL MEDICINE

## 2023-02-28 PROCEDURE — 1123F ACP DISCUSS/DSCN MKR DOCD: CPT | Performed by: INTERNAL MEDICINE

## 2023-02-28 PROCEDURE — 99214 OFFICE O/P EST MOD 30 MIN: CPT | Performed by: INTERNAL MEDICINE

## 2023-02-28 PROCEDURE — G8484 FLU IMMUNIZE NO ADMIN: HCPCS | Performed by: INTERNAL MEDICINE

## 2023-02-28 PROCEDURE — G8399 PT W/DXA RESULTS DOCUMENT: HCPCS | Performed by: INTERNAL MEDICINE

## 2023-02-28 PROCEDURE — 1036F TOBACCO NON-USER: CPT | Performed by: INTERNAL MEDICINE

## 2023-02-28 RX ORDER — OMEPRAZOLE 40 MG/1
40 CAPSULE, DELAYED RELEASE ORAL
Qty: 30 CAPSULE | Refills: 5 | Status: SHIPPED | OUTPATIENT
Start: 2023-02-28

## 2023-02-28 SDOH — ECONOMIC STABILITY: FOOD INSECURITY: WITHIN THE PAST 12 MONTHS, YOU WORRIED THAT YOUR FOOD WOULD RUN OUT BEFORE YOU GOT MONEY TO BUY MORE.: NEVER TRUE

## 2023-02-28 SDOH — ECONOMIC STABILITY: FOOD INSECURITY: WITHIN THE PAST 12 MONTHS, THE FOOD YOU BOUGHT JUST DIDN'T LAST AND YOU DIDN'T HAVE MONEY TO GET MORE.: NEVER TRUE

## 2023-02-28 SDOH — ECONOMIC STABILITY: HOUSING INSECURITY
IN THE LAST 12 MONTHS, WAS THERE A TIME WHEN YOU DID NOT HAVE A STEADY PLACE TO SLEEP OR SLEPT IN A SHELTER (INCLUDING NOW)?: NO

## 2023-02-28 SDOH — ECONOMIC STABILITY: INCOME INSECURITY: HOW HARD IS IT FOR YOU TO PAY FOR THE VERY BASICS LIKE FOOD, HOUSING, MEDICAL CARE, AND HEATING?: NOT HARD AT ALL

## 2023-02-28 ASSESSMENT — PATIENT HEALTH QUESTIONNAIRE - PHQ9
SUM OF ALL RESPONSES TO PHQ QUESTIONS 1-9: 0
SUM OF ALL RESPONSES TO PHQ9 QUESTIONS 1 & 2: 0
1. LITTLE INTEREST OR PLEASURE IN DOING THINGS: 0
SUM OF ALL RESPONSES TO PHQ QUESTIONS 1-9: 0
2. FEELING DOWN, DEPRESSED OR HOPELESS: 0
SUM OF ALL RESPONSES TO PHQ QUESTIONS 1-9: 0
SUM OF ALL RESPONSES TO PHQ QUESTIONS 1-9: 0

## 2023-02-28 ASSESSMENT — ENCOUNTER SYMPTOMS: ABDOMINAL PAIN: 1

## 2023-02-28 NOTE — PROGRESS NOTES
HISTORY OF PRESENT ILLNESS      Randy Dumont is a 68 y.o. female. BP (!) 178/76 (Site: Left Upper Arm, Position: Sitting, Cuff Size: Medium Adult)   Pulse 74   Temp 98.1 °F (36.7 °C) (Temporal)   Resp 17   Ht 5' 8\" (1.727 m)   Wt 168 lb (76.2 kg)   SpO2 96%   BMI 25.54 kg/m²     Reports 3 week h/o epigastric pain, burning when she eats/swallow. Food feels stuck sometimes. Water helps some  She has a dx of GERD but is no longer taking her prilosec (as a nurse friend told it her it may be causing her sxs). States this does not feel like her usual GERD. Abdominal Pain  This is a new problem. Review of Systems   Gastrointestinal:  Positive for abdominal pain. Dark stools. Not melanotic. No watery or loose. ? Unusual odor. Fecal leakage. She does not have the sensation always when she leaks. State it also is darker brown. Musculoskeletal:         Left great toe injury yesterday. Hurting today         Physical Exam  Vitals and nursing note reviewed. Constitutional:       Appearance: Normal appearance. HENT:      Head: Normocephalic and atraumatic. Cardiovascular:      Rate and Rhythm: Normal rate and regular rhythm. Pulmonary:      Effort: Pulmonary effort is normal.      Breath sounds: Normal breath sounds. Abdominal:      General: Abdomen is flat. Tenderness: There is abdominal tenderness (epigastric). Skin:     General: Skin is warm and dry. Neurological:      General: No focal deficit present. Mental Status: She is alert and oriented to person, place, and time. Psychiatric:         Mood and Affect: Mood normal.         Behavior: Behavior normal.       ASSESSMENT and PLAN      ICD-10-CM    1. Gastroesophageal reflux disease, unspecified whether esophagitis present  K21.9 FL UGI W AIR CONTRAST     omeprazole (PRILOSEC) 40 MG delayed release capsule     External Referral To Gastroenterology     CBC with Auto Differential      2.  Dark stools  R19.5 Occult Blood Stool Immunoassay     CBC with Auto Differential      3. Incontinence of feces, unspecified fecal incontinence type  R15.9 Occult Blood Stool Immunoassay           Restart the prilosec. The sxs could represent GERD with esophagitis, Esophagel dysmotility, esophageal spasms, gastritis, PUD. Dark stools. ? Will request stool test/FIT  Last EGD done 2019 by Dr. Ji Treadwell  Last colonoscopy done 2019    Request UGI to evaluate for dysmotility    Refer to GI.  Has been followed by general surgery since having a partial colectomy in 2004    F/u one month

## 2023-02-28 NOTE — PROGRESS NOTES
1. \"Have you been to the ER, urgent care clinic since your last visit? Hospitalized since your last visit? \" No    2. \"Have you seen or consulted any other health care providers outside of the [address] [place] [address] since your last visit? \" No     3. For patients aged 39-70: Has the patient had a colonoscopy / FIT/ Cologuard? NA - based on age      If the patient is female:    4. For patients aged 41-77: Has the patient had a mammogram within the past 2 years? NA - based on age or sex      11. For patients aged 21-65: Has the patient had a pap smear?  NA - based on age or sex

## 2023-03-07 LAB — CREATININE, EXTERNAL: 0.77

## 2023-03-26 RX ORDER — ERGOCALCIFEROL 1.25 MG/1
CAPSULE ORAL
Qty: 12 CAPSULE | Refills: 3 | Status: SHIPPED | OUTPATIENT
Start: 2023-03-26

## 2023-03-28 ENCOUNTER — TELEPHONE (OUTPATIENT)
Facility: CLINIC | Age: 78
End: 2023-03-28

## 2023-03-28 NOTE — TELEPHONE ENCOUNTER
Patient is calling in requesting an order for a diagnostic mammogram of the (L) breast.  States she found a new, painful, pea sized lump in her (L) breast. Informed patient if this is a new issue she would need to be seen and examined before the order if placed. 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 Asking if you can \"Fir her in\" at the end of the day.

## 2023-03-28 NOTE — TELEPHONE ENCOUNTER
She had a mammogram in December    She has a breast doctor. I suggest she call them. But she does need to be examined before any orders placed.

## 2023-03-29 NOTE — TELEPHONE ENCOUNTER
Spoke with pt in regards to breast concerns. Two patient identifier's verified. Relayed the PCP's note. Pt acknowledges understanding and states she will reach out to the Breast Surgeon's office.

## 2023-04-02 NOTE — TELEPHONE ENCOUNTER
Call made to pt. She was advised that she was overdue for [address] and offered appt. Pt declined stating that her provider is Dr Jerad Nunez @ [address] [place] [address] and that she already has upcoming appt in Sept. I verbalized understanding. [Maximal Pain Intensity: 0/10] : 0/10 [Least Pain Intensity: 0/10] : 0/10 [90: Able to carry normal activity; minor signs or symptoms of disease.] : 90: Able to carry normal activity; minor signs or symptoms of disease.  [ECOG Performance Status: 1 - Restricted in physically strenuous activity but ambulatory and able to carry out work of a light or sedentary nature] : Performance Status: 1 - Restricted in physically strenuous activity but ambulatory and able to carry out work of a light or sedentary nature, e.g., light house work, office work

## 2023-05-01 ENCOUNTER — CARE COORDINATION (OUTPATIENT)
Facility: CLINIC | Age: 78
End: 2023-05-01

## 2023-05-01 NOTE — CARE COORDINATION
Ambulatory Care Coordination Note  2023    Patient Current Location:  [place]    ACM contacted the patient by telephone. Verified name and  with patient as identifiers. Provided introduction to self, and explanation of the ACM role. ACM: Ana Maria Iniguez RN    Challenges to be reviewed by the provider   Additional needs identified to be addressed with provider: No  none               Method of communication with provider: none. Initial Enrollment call. Patient is independent, lives alone and still drives. 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 Denies any complaints or concerns at this time. Goals        Attend follow up appointments on schedule      Patient will attend all scheduled appointments through end of 2023         Knowledge and adherence of prescribed medication (ie. action, side effects, missed dose, etc.).       Pt will take all medications prescribed to be evaluated on each outreach through end                            Future Appointments   Date Time Provider Lucy Ch   2023  8:45 AM MD WANDA Gupta BS AMB   2023  9:40 AM ENRIQUE Vasquez - LEIGHA Gonzales

## 2023-05-09 ENCOUNTER — CARE COORDINATION (OUTPATIENT)
Facility: CLINIC | Age: 78
End: 2023-05-09

## 2023-05-09 ENCOUNTER — OFFICE VISIT (OUTPATIENT)
Facility: CLINIC | Age: 78
End: 2023-05-09
Payer: MEDICARE

## 2023-05-09 VITALS
HEART RATE: 65 BPM | DIASTOLIC BLOOD PRESSURE: 72 MMHG | RESPIRATION RATE: 16 BRPM | OXYGEN SATURATION: 96 % | HEIGHT: 68 IN | WEIGHT: 165 LBS | BODY MASS INDEX: 25.01 KG/M2 | SYSTOLIC BLOOD PRESSURE: 126 MMHG

## 2023-05-09 DIAGNOSIS — E55.9 VITAMIN D DEFICIENCY: ICD-10-CM

## 2023-05-09 DIAGNOSIS — R21 FACIAL RASH: Primary | ICD-10-CM

## 2023-05-09 DIAGNOSIS — R35.0 URINARY FREQUENCY: ICD-10-CM

## 2023-05-09 DIAGNOSIS — E11.9 TYPE 2 DIABETES MELLITUS WITHOUT COMPLICATION, WITHOUT LONG-TERM CURRENT USE OF INSULIN (HCC): ICD-10-CM

## 2023-05-09 DIAGNOSIS — R25.1 TREMOR, UNSPECIFIED: ICD-10-CM

## 2023-05-09 DIAGNOSIS — R53.83 OTHER FATIGUE: ICD-10-CM

## 2023-05-09 DIAGNOSIS — L65.9 HAIR THINNING: ICD-10-CM

## 2023-05-09 PROCEDURE — 99214 OFFICE O/P EST MOD 30 MIN: CPT | Performed by: INTERNAL MEDICINE

## 2023-05-09 PROCEDURE — 1036F TOBACCO NON-USER: CPT | Performed by: INTERNAL MEDICINE

## 2023-05-09 PROCEDURE — G8417 CALC BMI ABV UP PARAM F/U: HCPCS | Performed by: INTERNAL MEDICINE

## 2023-05-09 PROCEDURE — 1123F ACP DISCUSS/DSCN MKR DOCD: CPT | Performed by: INTERNAL MEDICINE

## 2023-05-09 PROCEDURE — 1090F PRES/ABSN URINE INCON ASSESS: CPT | Performed by: INTERNAL MEDICINE

## 2023-05-09 PROCEDURE — 3044F HG A1C LEVEL LT 7.0%: CPT | Performed by: INTERNAL MEDICINE

## 2023-05-09 PROCEDURE — G8399 PT W/DXA RESULTS DOCUMENT: HCPCS | Performed by: INTERNAL MEDICINE

## 2023-05-09 PROCEDURE — G8427 DOCREV CUR MEDS BY ELIG CLIN: HCPCS | Performed by: INTERNAL MEDICINE

## 2023-05-09 RX ORDER — METRONIDAZOLE 7.5 MG/G
GEL TOPICAL
Qty: 45 G | Refills: 1 | Status: SHIPPED | OUTPATIENT
Start: 2023-05-09

## 2023-05-09 ASSESSMENT — PATIENT HEALTH QUESTIONNAIRE - PHQ9
SUM OF ALL RESPONSES TO PHQ QUESTIONS 1-9: 0
2. FEELING DOWN, DEPRESSED OR HOPELESS: 0
SUM OF ALL RESPONSES TO PHQ QUESTIONS 1-9: 0
1. LITTLE INTEREST OR PLEASURE IN DOING THINGS: 0
SUM OF ALL RESPONSES TO PHQ9 QUESTIONS 1 & 2: 0

## 2023-05-09 NOTE — CARE COORDINATION
being investigated   Are the patients physical health problems impacting on their mental well-being?: No identified areas of concern   Are there any problems with your patients lifestyle behaviors (alcohol, drugs, diet, exercise) that are impacting on physical or mental well-being?: No identified areas of concern   Do you have any other concerns about your patients mental well-being? How would you rate their severity and impact on the patient?: No identified areas of concern   How would you rate their home environment in terms of safety and stability (including domestic violence, insecure housing, neighbor harassment)?: Consistently safe, supportive, stable, no identified problems   How do daily activities impact on the patient's well-being? (include current or anticipated unemployment, work, caregiving, access to transportation or other): No identified problems or perceived positive benefits   How would you rate their social network (family, work, friends)?: Good participation with social networks   How would you rate their financial resources (including ability to afford all required medical care)?: Financially secure, resources adequate, no identified problems   How wells does the patient now understand their health and well-being (symptoms, signs or risk factors) and what they need to do to manage their health?: Reasonable to good understanding and already engages in managing health or is willing to undertake better management   How well do you think your patient can engage in healthcare discussions? (Barriers include language, deafness, aphasia, alcohol or drug problems, learning difficulties, concentration): Clear and open communication, no identified barriers   Do other services need to be involved to help this patient?: Other care/services not required at this time   Are current services involved with this patient well-coordinated? (Include coordination with other services you are now recommendation):  All

## 2023-05-09 NOTE — PROGRESS NOTES
1. \"Have you been to the ER, urgent care clinic since your last visit? Hospitalized since your last visit? \" No    2. \"Have you seen or consulted any other health care providers outside of the [address] [place] [address] since your last visit? \" Yes Dr. Palak Gilbert      3. For patients aged 39-70: Has the patient had a colonoscopy / FIT/ Cologuard? Yes - Care Gap present. Most recent result on file  patient states she is scheduled on 5/15/2023. If the patient is female:    4. For patients aged 41-77: Has the patient had a mammogram within the past 2 years? Yes - no Care Gap present      5. For patients aged 21-65: Has the patient had a pap smear?  NA - based on age or sex
facial rash with a few pustules across cheeks and lower forehead. ? rosacea   Neurological:      General: No focal deficit present. Mental Status: She is alert and oriented to person, place, and time. Psychiatric:         Mood and Affect: Mood normal.         Behavior: Behavior normal.       ASSESSMENT and PLAN      ICD-10-CM    1. Facial rash  R21 metroNIDAZOLE (METROGEL) 0.75 % gel      2. Urinary frequency  R35.0 [facility] [place] [place]     Urinalysis with Microscopic     Culture, Urine      3. Other fatigue  R53.83 TSH + Free T4 Panel     T3     Magnesium      4. Hair thinning  L65.9 TSH + Free T4 Panel     T3     Magnesium      5. Type 2 diabetes mellitus without complication, without long-term current use of insulin ([facility])  M04.2 Basic Metabolic Panel     Hemoglobin A1C      6. Vitamin D deficiency  E55.9 Vitamin D 25 Hydroxy      7. Tremor, unspecified  R25.1 TSH + Free T4 Panel     Magnesium         Recheck bladder and urine   Refer to urology    Facial rash I suspect is rosacea. Start metrogel    Fatigue--check thyroid thought doubt this is a problem.  Update all diabetic labs    F/u 4-6 weeks to check face/skin

## 2023-05-10 LAB
25(OH)D3+25(OH)D2 SERPL-MCNC: 40.8 NG/ML (ref 30–100)
APPEARANCE UR: ABNORMAL
BACTERIA #/AREA URNS HPF: ABNORMAL /[HPF]
BILIRUB UR QL STRIP: NEGATIVE
BUN SERPL-MCNC: 13 MG/DL (ref 8–27)
BUN/CREAT SERPL: 20 (ref 12–28)
CASTS URNS QL MICRO: ABNORMAL /LPF
CHLORIDE SERPL-SCNC: 105 MMOL/L (ref 96–106)
CO2 SERPL-SCNC: 20 MMOL/L (ref 20–29)
COLOR UR: YELLOW
CREAT SERPL-MCNC: 0.64 MG/DL (ref 0.57–1)
EGFRCR SERPLBLD CKD-EPI 2021: 91 ML/MIN/1.73
EPI CELLS #/AREA URNS HPF: >10 /HPF (ref 0–10)
GLUCOSE SERPL-MCNC: 111 MG/DL (ref 70–99)
GLUCOSE UR QL STRIP: NEGATIVE
HBA1C MFR BLD: 6.3 % (ref 4.8–5.6)
HGB UR QL STRIP: NEGATIVE
KETONES UR QL STRIP: NEGATIVE
LEUKOCYTE ESTERASE UR QL STRIP: ABNORMAL
MAGNESIUM SERPL-MCNC: 2.2 MG/DL (ref 1.6–2.3)
MICRO URNS: ABNORMAL
NITRITE UR QL STRIP: NEGATIVE
PH UR STRIP: 5 [PH] (ref 5–7.5)
POTASSIUM SERPL-SCNC: 4.1 MMOL/L (ref 3.5–5.2)
PROT UR QL STRIP: NEGATIVE
RBC #/AREA URNS HPF: ABNORMAL /HPF (ref 0–2)
SODIUM SERPL-SCNC: 141 MMOL/L (ref 134–144)
SP GR UR STRIP: 1.02 (ref 1–1.03)
T3 SERPL-MCNC: 120 NG/DL (ref 71–180)
T4 FREE SERPL-MCNC: 0.92 NG/DL (ref 0.82–1.77)
TSH SERPL DL<=0.005 MIU/L-ACNC: 2.87 UIU/ML (ref 0.45–4.5)
UROBILINOGEN UR STRIP-MCNC: 1 MG/DL (ref 0.2–1)
WBC #/AREA URNS HPF: ABNORMAL /HPF (ref 0–5)

## 2023-05-11 LAB — BACTERIA UR CULT: NORMAL

## 2023-05-17 ENCOUNTER — CARE COORDINATION (OUTPATIENT)
Facility: CLINIC | Age: 78
End: 2023-05-17

## 2023-05-17 NOTE — CARE COORDINATION
Ambulatory Care Coordination Note  2023    Patient Current Location:  [place]     ACM contacted the patient by telephone. Verified name and  with patient as identifiers. Challenges to be reviewed by the provider   Additional needs identified to be addressed with provider: No  none               Method of communication with provider: none. ACM: Nikhil Sandoval RN    Patient states she has a sore throat and just feels achy today. Had Upper GI procedure yesterday and states that they went down her throat and removed a polyp. States throat really sore when she swallows. Call kept short and patient expressed discomfort with talking. Instructed to call GI doctor /PCP is symptoms don't improve in next few days. Care Coordination Interventions    Referral from Primary Care Provider: No  Suggested Interventions and Community Resources          Goals Addressed                   This Visit's Progress     Attend follow up appointments on schedule   On track     Patient will attend all scheduled appointments through          Knowledge and adherence of prescribed medication (ie. action, side effects, missed dose, etc.).    On track     Pt will take all medications prescribed to be evaluated on each outreach through end                 Future Appointments   Date Time Provider Lucy Ch   2023  8:20 AM Kristy Garcia Dr

## 2023-05-24 ENCOUNTER — CARE COORDINATION (OUTPATIENT)
Facility: CLINIC | Age: 78
End: 2023-05-24

## 2023-05-24 NOTE — CARE COORDINATION
Ambulatory Care Coordination Note  2023    Patient Current Location:  [place]     ACM contacted the patient by telephone. Verified name and  with patient as identifiers. Challenges to be reviewed by the provider   Additional needs identified to be addressed with provider: No  none               Method of communication with provider: none. ACM: Brooke Galvin RN    Patient states she is doing well and has no needs or concerns at this time. Has all of her medication and is taking as directed. Care Coordination Interventions    Referral from Primary Care Provider: No  Suggested Interventions and Community Resources          Goals Addressed                   This Visit's Progress     Attend follow up appointments on schedule   On track     Patient will attend all scheduled appointments through end          Knowledge and adherence of prescribed medication (ie. action, side effects, missed dose, etc.).    On track     Pt will take all medications prescribed to be evaluated on each outreach through                 Future Appointments   Date Time Provider Lucy Ch   2023  8:20 AM Kristy Zuniga Dr

## 2023-06-09 ENCOUNTER — CARE COORDINATION (OUTPATIENT)
Facility: CLINIC | Age: 78
End: 2023-06-09

## 2023-06-09 NOTE — CARE COORDINATION
Ambulatory Care Coordination Note  2023    Patient Current Location:  [place]     ACM contacted the patient by telephone. Verified name and  with patient as identifiers. Challenges to be reviewed by the provider   Additional needs identified to be addressed with provider: No  none               Method of communication with provider: none. ACM: Huseyin Steve RN       Patient is complaining of headache this am but otherwise feels pretty good. Eating breakfast at this time and then is going to take some Ibuprofen for her headache. No other complaints or concerns at this time - taking all of medications as directed. Instructed to call Dr. Amelia Singh if headache worsens or doesn't ease off. Care Coordination Interventions    Referral from Primary Care Provider: No  Suggested Interventions and Community Resources          Goals Addressed                   This Visit's Progress     Attend follow up appointments on schedule   On track     Patient will attend all scheduled appointments through          Knowledge and adherence of prescribed medication (ie. action, side effects, missed dose, etc.).    On track     Pt will take all medications prescribed to be evaluated on each outreach through end                 Future Appointments   Date Time Provider Lucy Ch   2023  8:20 AM Kristy Lundberg Dr

## 2023-06-22 ENCOUNTER — CARE COORDINATION (OUTPATIENT)
Facility: CLINIC | Age: 78
End: 2023-06-22

## 2023-07-06 ENCOUNTER — TELEPHONE (OUTPATIENT)
Facility: CLINIC | Age: 78
End: 2023-07-06

## 2023-07-06 ENCOUNTER — CARE COORDINATION (OUTPATIENT)
Facility: CLINIC | Age: 78
End: 2023-07-06

## 2023-07-06 DIAGNOSIS — L08.9 SKIN INFECTION: Primary | ICD-10-CM

## 2023-07-06 DIAGNOSIS — M62.838 MUSCLE SPASM: ICD-10-CM
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
## 2023-07-10 RX ORDER — ORPHENADRINE CITRATE 100 MG/1
TABLET, EXTENDED RELEASE ORAL
COMMUNITY
Start: 2023-07-05 | End: 2023-07-10

## 2023-07-10 RX ORDER — TIZANIDINE 4 MG/1
4 TABLET ORAL 3 TIMES DAILY PRN
Qty: 30 TABLET | Refills: 1 | Status: SHIPPED | OUTPATIENT
Start: 2023-07-10

## 2023-07-10 RX ORDER — DOXYCYCLINE HYCLATE 100 MG/1
100 CAPSULE ORAL 2 TIMES DAILY
Qty: 20 CAPSULE | Refills: 0 | Status: SHIPPED | OUTPATIENT
Start: 2023-07-10 | End: 2023-07-20

## 2023-07-10 RX ORDER — SULFAMETHOXAZOLE AND TRIMETHOPRIM 800; 160 MG/1; MG/1
1 TABLET ORAL EVERY 12 HOURS
COMMUNITY
Start: 2023-07-05 | End: 2023-07-10

## 2023-07-10 NOTE — TELEPHONE ENCOUNTER
Spoke with pt in regards to pain. Two patient identifier's verified. Relayed the PCP's note, needs OV. Pt states she went to [facility] over the weekend (7/5/23). and was prescribed medication on Friday and became ill - vomiting. Surescripts reviewed and confirmed with patient these are the medications. Pt states she would like to be referred back to the specialist for her neck for the neck injection. Chart reviewed, pt has seen [facility], [facility] for her neck, lower back, hip and knee. Additionally, pt c/o GI issues have not improved. Pt is encouraged to follow up with these offices if her concerns remain. Pt is agreeable and is provided with the contact information of the specialists offices. Pt acknowledges understanding and would like to know what should she do re: unable to complete ABT and an alternate muscle spasm med. Will notify.

## 2023-07-10 NOTE — TELEPHONE ENCOUNTER
Since they gave her an antibiotic, I assume she had a skin infection. I can try doxycycline, but if not careful, it could make her sick as well. I will send some tizanidine instead. These were sent to [facility]    Re her neck and joint pains, she does not need a referral from us since she has been seen before. She can call whichever orthopedic office she preferred for her neck and other sxs.         Orders Placed This Encounter    doxycycline hyclate (VIBRAMYCIN) 100 MG capsule     Sig: Take 1 capsule by mouth 2 times daily for 10 days     Dispense:  20 capsule     Refill:  0    tiZANidine (ZANAFLEX) 4 MG tablet     Sig: Take 1 tablet by mouth 3 times daily as needed (muscle spasms)     Dispense:  30 tablet     Refill:  1

## 2023-07-12 NOTE — TELEPHONE ENCOUNTER
Spoke with pt in regards to medications/recommendations. Two patient identifier's verified. Relayed the PCP's note. Pt acknowledges understanding and voices no concerns at this time.

## 2023-07-17 ENCOUNTER — CARE COORDINATION (OUTPATIENT)
Facility: CLINIC | Age: 78
End: 2023-07-17

## 2023-07-17 NOTE — CARE COORDINATION
7/17/2023        2:31 PM    ACM reviewed patient chart and then attempted to call patient for routine follow up. Patient was not available at the time of the call. Left a voice mail message for her introducing myself, explaining the reason for my call and providing my contact information. Requested that patient return my call at his earliest convenience. Will follow.

## 2023-07-20 ENCOUNTER — CARE COORDINATION (OUTPATIENT)
Facility: CLINIC | Age: 78
End: 2023-07-20

## 2023-07-20 NOTE — CARE COORDINATION
Ambulatory Care Coordination Note  2023    Patient Current Location:  Home: [address] [facility] [address]     ACM contacted the patient by telephone. Verified name and  with patient as identifiers. Provided introduction to self, and explanation of the ACM role. Challenges to be reviewed by the provider   Additional needs identified to be addressed with provider: No  none               Method of communication with provider: staff message. ACM: Colonel Lilibeth RN    ACM reviewed chart and then called patient for routine follow up. She stated that she missed her appt with PCP as she had to take a friend to doctor. Patient stated that she wants another appt. ACM will call office and get one. Patient stated that she has all meds and is taking as directed. No issues with meds. Patient is eating and drinking. She is moving about as she can and resting when needed. Offered patient enrollment in the Remote Patient Monitoring (RPM) program for in-home monitoring: NA.    Care Coordination Interventions    Referral from Primary Care Provider: No  Suggested Interventions and Community Resources          Goals Addressed                   This Visit's Progress     Attend follow up appointments on schedule        Patient will attend all scheduled appointments. Patient scheduled for appt with PCP 2023. Patient missed appt. Will reschedule         Knowledge and adherence of prescribed medication (ie. action, side effects, missed dose, etc.). Pt will take all medications prescribed to be evaluated on each outreach. Patient stated that she has all meds and is taking as directed. Patient stated that she has all meds and is taking as directed.                  Future Appointments   Date Time Provider [address]   2023  8:20 AM ENRIQUE Tanner NP

## 2023-07-21 ENCOUNTER — CARE COORDINATION (OUTPATIENT)
Facility: CLINIC | Age: 78
End: 2023-07-21

## 2023-07-21 NOTE — CARE COORDINATION
7/21/2023         10:55 AM    ACM called patient with details of her appt. She will see Dr Kashif Vallejo 7/24/2023 at 11:45 AM. Patient wrote information down while I was talking with her. She stated that she would be there. At request of scheduling staff- they wanted patient to call insurance to ensure that PCP is in network for her plan. Explained that if PCP is not in network she can still see her, but her costs will be higher. Patient appreciated the call. Will follow.

## 2023-07-24 ENCOUNTER — OFFICE VISIT (OUTPATIENT)
Facility: CLINIC | Age: 78
End: 2023-07-24
Payer: MEDICARE

## 2023-07-24 VITALS
HEIGHT: 68 IN | DIASTOLIC BLOOD PRESSURE: 64 MMHG | HEART RATE: 64 BPM | WEIGHT: 167 LBS | OXYGEN SATURATION: 97 % | SYSTOLIC BLOOD PRESSURE: 129 MMHG | RESPIRATION RATE: 20 BRPM | BODY MASS INDEX: 25.31 KG/M2 | TEMPERATURE: 97.4 F

## 2023-07-24 DIAGNOSIS — R41.3 MEMORY IMPAIRMENT OF GRADUAL ONSET: ICD-10-CM

## 2023-07-24 DIAGNOSIS — K21.9 GASTROESOPHAGEAL REFLUX DISEASE, UNSPECIFIED WHETHER ESOPHAGITIS PRESENT: ICD-10-CM

## 2023-07-24 DIAGNOSIS — R63.4 WEIGHT LOSS: ICD-10-CM

## 2023-07-24 DIAGNOSIS — L65.9 THINNING HAIR: Primary | ICD-10-CM

## 2023-07-24 PROCEDURE — G8427 DOCREV CUR MEDS BY ELIG CLIN: HCPCS | Performed by: INTERNAL MEDICINE

## 2023-07-24 PROCEDURE — 1036F TOBACCO NON-USER: CPT | Performed by: INTERNAL MEDICINE

## 2023-07-24 PROCEDURE — G8417 CALC BMI ABV UP PARAM F/U: HCPCS | Performed by: INTERNAL MEDICINE

## 2023-07-24 PROCEDURE — 1123F ACP DISCUSS/DSCN MKR DOCD: CPT | Performed by: INTERNAL MEDICINE

## 2023-07-24 PROCEDURE — G8399 PT W/DXA RESULTS DOCUMENT: HCPCS | Performed by: INTERNAL MEDICINE

## 2023-07-24 PROCEDURE — 99213 OFFICE O/P EST LOW 20 MIN: CPT | Performed by: INTERNAL MEDICINE

## 2023-07-24 PROCEDURE — 1090F PRES/ABSN URINE INCON ASSESS: CPT | Performed by: INTERNAL MEDICINE

## 2023-07-24 RX ORDER — OMEPRAZOLE 40 MG/1
40 CAPSULE, DELAYED RELEASE ORAL 2 TIMES DAILY
Qty: 90 CAPSULE | Refills: 0 | Status: SHIPPED | OUTPATIENT
Start: 2023-07-24 | End: 2023-09-07

## 2023-07-24 ASSESSMENT — ENCOUNTER SYMPTOMS
ABDOMINAL PAIN: 1
BREAST PAIN: 1

## 2023-07-24 NOTE — PROGRESS NOTES
ROOM # 6    Identified pt with two pt identifiers(name and ). Reviewed record in preparation for visit and have obtained necessary documentation. Chief Complaint   Patient presents with    Chronic Pain     LT knee, RT knee, LT-sided neck     Breast Pain     RT breast, arm pit pain    Abdominal Pain     Mid chest pain when swallowing    Follow-up      Johanny Harrison preferred language for health care discussion is English/other. Is the patient using any DME equipment during OV? Ethyl Lapidus is due for:  Health Maintenance Due   Topic    COVID-19 Vaccine (1)    Shingles vaccine (2 of 3)    Pneumococcal 65+ years Vaccine (2 - PPSV23 if available, else PCV20)    Diabetic retinal exam     Diabetic foot exam     Annual Wellness Visit (AWV)      Health Maintenance reviewed and discussed per provider  Please order/place referral if appropriate. 1. For patients aged 43-73: Has the patient had a colonoscopy? NA-AGE  If the patient is female:    2. For patients aged 43-66: Has the patient had a mammogram within the past 2 years? NA-AGE    3. For patients aged 21-65: Has the patient had a pap smear? NA-AGE    Advance Directive:  1. Do you have an advance directive in place? Patient Reply: NOT ASKED    2. If not, would you like material regarding how to put one in place? NOT ASKED    Coordination of Care:  1. Have you been to the ER, urgent care clinic since your last visit? Hospitalized since your last visit? NO    2. Have you seen or consulted any other health care providers outside of the [address] [place] [address] since your last visit? Include any pap smears or colon screening. NO    Patient is accompanied by HERSELF I have received verbal consent from Johanny Harrison to discuss any/all medical information while they are present in the room. Learning Assessment:  No flowsheet data found. Depression Screening:  No flowsheet data found.   Abuse Screening:  No flowsheet data

## 2023-07-27 ENCOUNTER — CARE COORDINATION (OUTPATIENT)
Facility: CLINIC | Age: 78
End: 2023-07-27

## 2023-07-27 NOTE — CARE COORDINATION
Ambulatory Care Coordination Note  2023    Patient Current Location:  Home: [address]  [id number]     ACM contacted the patient by telephone. Verified name and  with patient as identifiers. Provided introduction to self, and explanation of the ACM role. Challenges to be reviewed by the provider   Additional needs identified to be addressed with provider: No  none               Method of communication with provider: staff message. ACM: Robby Powell RN    ACM reviewed patient chart and then called patient for routine follow up. Patient did attend the follow up appt with her PCP. She stated that appt went well. Patient got some issues addressed. Patient has all meds and is taking. No issues with medications at present time. Discussed with patient importance of eating a healthy diet. Encouraged her to choose fresh and fresh frozen and to bake, grill, broil and steam. Limit fried foods. Also encouraged patient to do exercises sitting in chair when she is having a bad day. Offered patient enrollment in the Remote Patient Monitoring (RPM) program for in-home monitoring: NA.      Care Coordination Interventions    Referral from Primary Care Provider: No  Suggested Interventions and Community Resources          Goals Addressed                   This Visit's Progress     Attend follow up appointments on schedule        Patient will attend all scheduled appointments. Patient scheduled for appt with PCP 2023. Patient missed appt. Will reschedule    Patient attended the rescheduled appt 2023         Knowledge and adherence of prescribed medication (ie. action, side effects, missed dose, etc.). Pt will take all medications prescribed to be evaluated on each outreach. Patient stated that she has all meds and is taking as directed. Patient stated that she has all meds and is taking as directed. No medication concerns at present.                  Future

## 2023-08-03 ENCOUNTER — CARE COORDINATION (OUTPATIENT)
Facility: CLINIC | Age: 78
End: 2023-08-03

## 2023-08-03 NOTE — CARE COORDINATION
8/3/2023         11:06 AM    ACM reviewed patient chart and then attempted to call her for routine follow up. She was unavailable at the time of the call. A voice mail message was left for patient introducing myself, explaining the reason for the call and providing my contact information. Requested that patient returns my call at her earliest convenience. Will follow.

## 2023-08-17 ENCOUNTER — CARE COORDINATION (OUTPATIENT)
Facility: CLINIC | Age: 78
End: 2023-08-17

## 2023-08-17 NOTE — CARE COORDINATION
Ambulatory Care Coordination Note  2023    Patient Current Location:  Home: [address] [facility] [address]     ACM contacted the patient by telephone. Verified name and  with patient as identifiers. Provided introduction to self, and explanation of the ACM role. Challenges to be reviewed by the provider   Additional needs identified to be addressed with provider: No  none               Method of communication with provider: staff message. ACM: Emma Brandt RN    Patient stated that she is doing pretty good today. She is still having pain in hip and knee. This is an ongoing problem for over a year. PCP had referred patient to ortho-patient waiting on call. Patient stated that she has all meds and is taking as directed. No problems with meds. Patient has no care needs noted. Offered patient enrollment in the Remote Patient Monitoring (RPM) program for in-home monitoring: NA.      Care Coordination Interventions    Referral from Primary Care Provider: No  Suggested Interventions and Community Resources          Goals Addressed                   This Visit's Progress     Attend follow up appointments on schedule        Patient will attend all scheduled appointments. Patient scheduled for appt with PCP 2023. Patient missed appt.  Will reschedule    Patient attended the rescheduled appt 2023    Patient awaiting call from ortho doctor for hip and knee                Future Appointments   Date Time Provider [address]   2023  8:20 AM Mati Alexandra

## 2023-09-01 ENCOUNTER — CARE COORDINATION (OUTPATIENT)
Facility: CLINIC | Age: 78
End: 2023-09-01
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
## 2023-09-15 ENCOUNTER — CARE COORDINATION (OUTPATIENT)
Facility: CLINIC | Age: 78
End: 2023-09-15

## 2023-09-15 NOTE — CARE COORDINATION
Ambulatory Care Coordination Note  9/15/2023    Patient Current Location:  Home: [address] [facility] [address]     ACM contacted the patient by telephone. Verified name and  with patient as identifiers. Provided introduction to self, and explanation of the ACM role. Challenges to be reviewed by the provider   Additional needs identified to be addressed with provider: No  none               Method of communication with provider: staff message. ACM: Claudio Keen RN    ACM called patient after reviewing her chart. She stated that she is doing pretty well. She stated that she is still having bladder issues. Explained that in chart she had a new medication to . She stated that she will get it today and begin. She has all other meds and is taking as directed. No concerns. She stated that her hip is still really bothering her as well. At last PCP visit- physician recommended she temi Dr Kathleene Kocher for follow up at [phone]. Patient stated that she will call and gt an appt. She is eating and drinking well. She is striving to eat healthy. Offered patient enrollment in the Remote Patient Monitoring (RPM) program for in-home monitoring: NA.         Goals Addressed                   This Visit's Progress     Attend follow up appointments on schedule        Patient will attend all scheduled appointments. Patient scheduled for appt with PCP 2023. Patient missed appt. Will reschedule    Patient attended the rescheduled appt 2023    Patient awaiting call from ortho doctor for hip and knee    Patient doing well.  Advised to call Dr Kathleene Kocher off for f/u on knee and hip                Future Appointments   Date Time Provider [address]   2023  8:20 AM Gely Dobson PT [facility] [place] Sched   2023  9:40 AM Gely Dobson PT [facility] [place] Sched   2024 10:40 AM ENRIQUE Mccarthy NP

## 2023-09-21 ENCOUNTER — HOSPITAL ENCOUNTER (OUTPATIENT)
Facility: HOSPITAL | Age: 78
Discharge: HOME | End: 2023-09-21
Attending: INTERNAL MEDICINE
Payer: MEDICARE

## 2023-09-21 ENCOUNTER — HOSPITAL ENCOUNTER (OUTPATIENT)
Dept: WOMENS IMAGING | Facility: HOSPITAL | Age: 78
End: 2023-09-21
Attending: INTERNAL MEDICINE
Payer: MEDICARE

## 2023-09-21 ENCOUNTER — OFFICE VISIT (OUTPATIENT)
Facility: CLINIC | Age: 78
End: 2023-09-21
Payer: MEDICARE

## 2023-09-21 VITALS
HEIGHT: 68 IN | OXYGEN SATURATION: 94 % | BODY MASS INDEX: 25.61 KG/M2 | DIASTOLIC BLOOD PRESSURE: 71 MMHG | SYSTOLIC BLOOD PRESSURE: 134 MMHG | HEART RATE: 80 BPM | RESPIRATION RATE: 17 BRPM | TEMPERATURE: 97.1 F | WEIGHT: 169 LBS

## 2023-09-21 DIAGNOSIS — N63.10 MASS OF RIGHT BREAST, UNSPECIFIED QUADRANT: ICD-10-CM

## 2023-09-21 DIAGNOSIS — L98.9 DERMATOSIS: ICD-10-CM

## 2023-09-21 DIAGNOSIS — Z78.0 POST-MENOPAUSE: ICD-10-CM

## 2023-09-21 DIAGNOSIS — Z13.820 SCREENING FOR OSTEOPOROSIS: ICD-10-CM

## 2023-09-21 DIAGNOSIS — Z12.31 ENCOUNTER FOR SCREENING MAMMOGRAM FOR MALIGNANT NEOPLASM OF BREAST: ICD-10-CM

## 2023-09-21 DIAGNOSIS — M17.12 OSTEOARTHRITIS OF LEFT KNEE, UNSPECIFIED OSTEOARTHRITIS TYPE: Primary | ICD-10-CM

## 2023-09-21 PROCEDURE — G8428 CUR MEDS NOT DOCUMENT: HCPCS | Performed by: INTERNAL MEDICINE

## 2023-09-21 PROCEDURE — 99214 OFFICE O/P EST MOD 30 MIN: CPT | Performed by: INTERNAL MEDICINE

## 2023-09-21 PROCEDURE — 1123F ACP DISCUSS/DSCN MKR DOCD: CPT | Performed by: INTERNAL MEDICINE

## 2023-09-21 PROCEDURE — G8417 CALC BMI ABV UP PARAM F/U: HCPCS | Performed by: INTERNAL MEDICINE

## 2023-09-21 PROCEDURE — 1036F TOBACCO NON-USER: CPT | Performed by: INTERNAL MEDICINE

## 2023-09-21 PROCEDURE — G8399 PT W/DXA RESULTS DOCUMENT: HCPCS | Performed by: INTERNAL MEDICINE

## 2023-09-21 PROCEDURE — 76642 ULTRASOUND BREAST LIMITED: CPT

## 2023-09-21 PROCEDURE — G0279 TOMOSYNTHESIS, MAMMO: HCPCS

## 2023-09-21 PROCEDURE — 1090F PRES/ABSN URINE INCON ASSESS: CPT | Performed by: INTERNAL MEDICINE

## 2023-09-21 ASSESSMENT — PATIENT HEALTH QUESTIONNAIRE - PHQ9
SUM OF ALL RESPONSES TO PHQ QUESTIONS 1-9: 0
2. FEELING DOWN, DEPRESSED OR HOPELESS: 0
SUM OF ALL RESPONSES TO PHQ QUESTIONS 1-9: 0
SUM OF ALL RESPONSES TO PHQ9 QUESTIONS 1 & 2: 0
1. LITTLE INTEREST OR PLEASURE IN DOING THINGS: 0

## 2023-09-21 NOTE — PROGRESS NOTES
1. \"Have you been to the ER, urgent care clinic since your last visit? Hospitalized since your last visit? \" No    2. \"Have you seen or consulted any other health care providers outside of the [address] [place] [address] since your last visit? \" No     3. For patients aged 43-73: Has the patient had a colonoscopy / FIT/ Cologuard? NA - based on age      If the patient is female:    4. For patients aged 43-66: Has the patient had a mammogram within the past 2 years? NA - based on age or sex      11. For patients aged 21-65: Has the patient had a pap smear?  NA - based on age or sex
Age of Onset    Breast Cancer Maternal Aunt     Cancer Mother     Lung Disease Father          Current Outpatient Medications:     mirabegron (MYRBETRIQ) 50 MG TB24, Take 50 mg by mouth daily, Disp: 30 tablet, Rfl: 5    tiZANidine (ZANAFLEX) 4 MG tablet, Take 1 tablet by mouth 3 times daily as needed (muscle spasms), Disp: 30 tablet, Rfl: 1    metroNIDAZOLE (METROGEL) 0.75 % gel, Apply topically 2 times daily. , Disp: 45 g, Rfl: 1    estradiol (CLIMARA) 0.1 MG/24HR, APPLY 1 PATCH TOPICALLY ONCE A WEEK, Disp: 4 patch, Rfl: 3    vitamin D (ERGOCALCIFEROL) 1.25 MG (70417 UT) CAPS capsule, Take 1 capsule by mouth once a week, Disp: 12 capsule, Rfl: 3    albuterol sulfate HFA (PROVENTIL;VENTOLIN;PROAIR) 108 (90 Base) MCG/ACT inhaler, INHALE 2 PUFFS BY MOUTH EVERY 6 HOURS AS NEEDED FOR WHEEZING FOR SHORTNESS OF BREATH, Disp: , Rfl:     aspirin 325 MG tablet, Take 2 tablets by mouth 2 times daily, Disp: , Rfl:     lidocaine (LIDODERM) 5 %, Apply 1 patch as directed for 12 hours every 24 hours (12 hours on, 12 hours off), Disp: , Rfl:     rOPINIRole (REQUIP) 0.5 MG tablet, Take 1 tablet by mouth 2 times daily as needed, Disp: , Rfl:     simvastatin (ZOCOR) 20 MG tablet, , Disp: , Rfl:     triamcinolone (KENALOG) 0.025 % LOTN lotion, Apply topically 2 times daily, Disp: , Rfl:     omeprazole (PRILOSEC) 40 MG delayed release capsule, Take 1 capsule by mouth in the morning and at bedtime, Disp: 90 capsule, Rfl: 0    fluticasone (FLONASE) 50 MCG/ACT nasal spray, 2 sprays by Nasal route daily (Patient not taking: Reported on 7/24/2023), Disp: , Rfl:     ibuprofen (ADVIL;MOTRIN) 600 MG tablet, Take 600 mg by mouth every 6 hours as needed (Patient not taking: Reported on 2/28/2023), Disp: , Rfl:     tolterodine (DETROL LA) 4 MG extended release capsule, 1 cap (Patient not taking: Reported on 9/11/2023), Disp: , Rfl:      Allergies   Allergen Reactions    Bee Venom Anaphylaxis     hives    Penicillins Anaphylaxis and Hives     Other

## 2023-09-29 ENCOUNTER — CARE COORDINATION (OUTPATIENT)
Facility: CLINIC | Age: 78
End: 2023-09-29

## 2023-09-29 NOTE — CARE COORDINATION
Future Appointments   Date Time Provider [address]   12/12/2023  8:20 AM Armani Padron, PT [facility] [place] Sched   12/19/2023  9:40 AM Armani Padron, PT [facility] [place] Sched   1/11/2024 10:40 AM Erica Cunha, APRN - NP [facility] Mckenzie

## 2023-10-13 ENCOUNTER — CARE COORDINATION (OUTPATIENT)
Facility: CLINIC | Age: 78
End: 2023-10-13

## 2023-10-13 NOTE — CARE COORDINATION
Ambulatory Care Coordination Note  10/13/2023    Patient Current Location:  Home: [address]  [address]     ACM contacted the patient by telephone. Verified name and  with patient as identifiers. Provided introduction to self, and explanation of the ACM role. Challenges to be reviewed by the provider   Additional needs identified to be addressed with provider: No  none               Method of communication with provider: staff message. ACM: Sana Us RN    ACM reviewed patient chart and then contacted patient for routine follow up. Patient is doing well. She has her chronic pain issues but is managing. She has all meds and is taking as directed. No concerns. Patient is eating and drinking well. Patient moves about as she needs to. At times she has to stop due to pain. After a rest then she continues with what she needs to do    Offered patient enrollment in the Remote Patient Monitoring (RPM) program for in-home monitoring: NA.         Goals Addressed                   This Visit's Progress     Attend follow up appointments on schedule        Patient will attend all scheduled appointments. Patient scheduled for appt with PCP 2023. Patient missed appt. Will reschedule    Patient attended the rescheduled appt 2023    Patient awaiting call from ortho doctor for hip and knee    Patient doing well. Advised to call Dr Latonya Mendenhall off for f/u on knee and hip    Patient saw Dr Fernandez Due for some concerns. F/U made as needed. Patient has appts with [facility]         Knowledge and adherence of prescribed medication (ie. action, side effects, missed dose, etc.). Pt will take all medications prescribed to be evaluated on each outreach. Patient stated that she has all meds and is taking as directed. Patient stated that she has all meds and is taking as directed. No medication concerns at present. Patient has all meds and is taking as prescribed.

## 2023-10-27 ENCOUNTER — CARE COORDINATION (OUTPATIENT)
Facility: CLINIC | Age: 78
End: 2023-10-27

## 2023-10-27 NOTE — CARE COORDINATION
10/27/2023         9:51 AM      ACM closing episode at this time. ACP has been reviewed and information sent to patient as needed. Use of MyChart has been discussed with patient/family understanding its use. Med rec has been completed and up to date at time of closing episode. Importance of taking all medications as prescribed and on time, maintaining an adequate supply of medication, and how to obtain refills. Goals are updated and met to the best of patient's ability. Importance of keeping all scheduled appointments and how to make those appointments discussed with patient/family understanding. Review of symptoms and disease process discussed as needed, with patient/family showing understanding.   No further ACM contacts scheduled  Patient/family has ACM contact information for any further questions, concerns or needs

## 2023-10-30 ENCOUNTER — CARE COORDINATION (OUTPATIENT)
Facility: CLINIC | Age: 78
End: 2023-10-30

## 2023-10-30 NOTE — CARE COORDINATION
10/30/2023       10:40 AM    Patient called and left a message on phone on Sun 10/29/2023. She asked for ACM to call her back as she was having a couple issues. ACM called patient after reviewing chart. Patient stated that her stomach was really bothering her and she was having some diarrhea. She stated that symptoms started Sun. She described pain as an ache. She stated that she wanted something to take over the counter. Recommended patient take imodium. Patient stated that she will get some and take. Patient stated that she is eating and drinking. Advised patient to follow BRAT diet for a few days to rest stomach. Patient stated that she would give it a try. Advised if symptoms did not improve within a day or two to call PCP office and make appt to be seen. Patient appreciated a call back.

## 2023-12-14 ENCOUNTER — OFFICE VISIT (OUTPATIENT)
Facility: CLINIC | Age: 78
End: 2023-12-14
Payer: MEDICARE

## 2023-12-14 VITALS
SYSTOLIC BLOOD PRESSURE: 158 MMHG | BODY MASS INDEX: 25.76 KG/M2 | RESPIRATION RATE: 16 BRPM | TEMPERATURE: 96.9 F | HEIGHT: 68 IN | HEART RATE: 66 BPM | OXYGEN SATURATION: 96 % | WEIGHT: 170 LBS | DIASTOLIC BLOOD PRESSURE: 79 MMHG

## 2023-12-14 DIAGNOSIS — R13.12 OROPHARYNGEAL DYSPHAGIA: Primary | ICD-10-CM

## 2023-12-14 DIAGNOSIS — K59.00 CONSTIPATION, UNSPECIFIED CONSTIPATION TYPE: ICD-10-CM

## 2023-12-14 PROCEDURE — 1090F PRES/ABSN URINE INCON ASSESS: CPT | Performed by: INTERNAL MEDICINE

## 2023-12-14 PROCEDURE — G8399 PT W/DXA RESULTS DOCUMENT: HCPCS | Performed by: INTERNAL MEDICINE

## 2023-12-14 PROCEDURE — 1123F ACP DISCUSS/DSCN MKR DOCD: CPT | Performed by: INTERNAL MEDICINE

## 2023-12-14 PROCEDURE — G8417 CALC BMI ABV UP PARAM F/U: HCPCS | Performed by: INTERNAL MEDICINE

## 2023-12-14 PROCEDURE — 99214 OFFICE O/P EST MOD 30 MIN: CPT | Performed by: INTERNAL MEDICINE

## 2023-12-14 PROCEDURE — G8428 CUR MEDS NOT DOCUMENT: HCPCS | Performed by: INTERNAL MEDICINE

## 2023-12-14 PROCEDURE — 1036F TOBACCO NON-USER: CPT | Performed by: INTERNAL MEDICINE

## 2023-12-14 PROCEDURE — G8484 FLU IMMUNIZE NO ADMIN: HCPCS | Performed by: INTERNAL MEDICINE

## 2023-12-14 RX ORDER — LIDOCAINE HYDROCHLORIDE 20 MG/ML
SOLUTION OROPHARYNGEAL
Qty: 100 ML | Refills: 0 | Status: SHIPPED | OUTPATIENT
Start: 2023-12-14

## 2023-12-14 ASSESSMENT — PATIENT HEALTH QUESTIONNAIRE - PHQ9: DEPRESSION UNABLE TO ASSESS: URGENT/EMERGENT SITUATION

## 2023-12-14 NOTE — PATIENT INSTRUCTIONS
Dulcolax tablets.  2 today, then one daily as needed for constipation  Continue fiver and water/fluids

## 2023-12-14 NOTE — PROGRESS NOTES
1. \"Have you been to the ER, urgent care clinic since your last visit? Hospitalized since your last visit? \" No    2. \"Have you seen or consulted any other health care providers outside of the [address] [place] [address] since your last visit? \" No     3. For patients aged 43-73: Has the patient had a colonoscopy / FIT/ Cologuard? NA - based on age      If the patient is female:    4. For patients aged 43-66: Has the patient had a mammogram within the past 2 years? NA - based on age or sex      11. For patients aged 21-65: Has the patient had a pap smear?  NA - based on age or sex
dry.   Neurological:      General: No focal deficit present. Mental Status: She is alert and oriented to person, place, and time. Psychiatric:         Mood and Affect: Mood normal.         Behavior: Behavior normal.         ASSESSMENT and PLAN      ICD-10-CM    1. Oropharyngeal dysphagia  R13.12 FL UGI     lidocaine viscous hcl (XYLOCAINE) 2 % SOLN solution      2. Constipation, unspecified constipation type  K59.00          Nothing obvious on her exam  Will tx with viscous lidocaine  mixed with maalox  Request UGI  Consider ENT referral    Constipation.  Recommend dulcolax--2 today then one daily as needed    F/u as appointed Dec 28

## 2023-12-15 ENCOUNTER — TELEPHONE (OUTPATIENT)
Facility: CLINIC | Age: 78
End: 2023-12-15

## 2023-12-15 NOTE — TELEPHONE ENCOUNTER
PCP notified and informed the pt needs to be evaluated and it is best to go to the Urgent Care or ED per Dr. Brooks Sender.     Spoke with pt in regards to referral for a mammogram. Two patient identifier's verified. Relayed the PCP's note. Pt acknowledges understanding and voices no concerns at this time.

## 2023-12-15 NOTE — TELEPHONE ENCOUNTER
Patient called asking for referral for MAGDALENE-Gram. Patient states when she woke up that her right nipple has pain and under arm is swollen.

## 2023-12-22 ENCOUNTER — TELEPHONE (OUTPATIENT)
Facility: CLINIC | Age: 78
End: 2023-12-22

## 2023-12-22 NOTE — TELEPHONE ENCOUNTER
----- Message from Katherin Jordan sent at 12/22/2023  1:02 PM EST -----  Subject: Message to Provider    QUESTIONS  Information for Provider? Pt calling in and stated in needs a order   mammogram stated recieved a letter stating pt needs one but has no order   if someone can give the pt a call back once order is put in .  ---------------------------------------------------------------------------  --------------  Lainey [place] Jolly  [phone]; OK to leave message on voicemail  ---------------------------------------------------------------------------  --------------  SCRIPT ANSWERS  Relationship to Patient?  Self

## 2024-01-03 RX ORDER — ERGOCALCIFEROL 1.25 MG/1
CAPSULE ORAL
Qty: 12 CAPSULE | Refills: 3 | Status: SHIPPED | OUTPATIENT
Start: 2024-01-03

## 2024-01-12 ENCOUNTER — TELEPHONE (OUTPATIENT)
Facility: CLINIC | Age: 79
End: 2024-01-12

## 2024-03-26 ENCOUNTER — TELEPHONE (OUTPATIENT)
Facility: CLINIC | Age: 79
End: 2024-03-26

## 2024-03-26 NOTE — TELEPHONE ENCOUNTER
Received a call from the [facility] spoke with Arleth. Patient has been experiencing abdominal pain, dark stools  and weight loss. Patient had an appt today but had to reschedule it to 4/11/2024.  Patient urged to go to the ER or urgent care. Patient verbalized understanding and expressed no further concerns.

## 2024-03-26 NOTE — TELEPHONE ENCOUNTER
She missed the appt as well.  This is not a new problem. She has been seen by general surgery. Her colonoscopy is up-to-date as well.

## 2024-05-07 ENCOUNTER — TELEPHONE (OUTPATIENT)
Facility: CLINIC | Age: 79
End: 2024-05-07

## 2024-05-07 NOTE — TELEPHONE ENCOUNTER
----- Message from Lety Cedillo sent at 5/7/2024  1:26 PM EDT -----  Subject: Referral Request    Reason for referral request? mammogram   Provider patient wants to be referred to(if known):     Provider Phone Number(if known):    Additional Information for Provider? pain in right breast   ---------------------------------------------------------------------------  --------------  CALL BACK INFO    [phone]; OK to leave message on voicemail  ---------------------------------------------------------------------------  --------------

## 2024-05-07 NOTE — TELEPHONE ENCOUNTER
----- Message from Lety Cedillo sent at 5/7/2024  1:26 PM EDT -----  Subject: Referral Request    Reason for referral request? bone density test requested  Provider patient wants to be referred to(if known):     Provider Phone Number(if known):    Additional Information for Provider?   ---------------------------------------------------------------------------  --------------  CALL BACK INFO    [phone]; OK to leave message on voicemail  ---------------------------------------------------------------------------  --------------

## 2024-05-07 NOTE — TELEPHONE ENCOUNTER
Spoke with pt in regards to missed appointments and requested orders. Two patient identifier's verified.Pt states she was at her Dermatologist appointment that ran over to why she rescheduled the appointment for today. Pt states she spoke with someone in the imaging center and informed she may be due for her annual bone desnity and mammogram. Chart reveiwed, last completed in 2022. Pt is agreeable ro speaking with the PCP about her request at the 05/09/24 appointment. Pt states her main concerns are her bones. PCP notified.

## 2024-05-07 NOTE — TELEPHONE ENCOUNTER
She may, in fact, be due for her mammogram.  She last had a bilateral digital diagnostic on December 8, 2022.  She is not due, however, until December 2024 for her bone density.    If she is having any additional breast symptoms she will need to have another digital diagnostic mammogram.

## 2024-05-09 ENCOUNTER — OFFICE VISIT (OUTPATIENT)
Facility: CLINIC | Age: 79
End: 2024-05-09
Payer: MEDICARE

## 2024-05-09 VITALS
OXYGEN SATURATION: 96 % | DIASTOLIC BLOOD PRESSURE: 72 MMHG | RESPIRATION RATE: 16 BRPM | WEIGHT: 167.8 LBS | HEART RATE: 74 BPM | BODY MASS INDEX: 25.43 KG/M2 | SYSTOLIC BLOOD PRESSURE: 170 MMHG | HEIGHT: 68 IN | TEMPERATURE: 97.5 F

## 2024-05-09 DIAGNOSIS — R10.84 GENERALIZED ABDOMINAL PAIN: ICD-10-CM

## 2024-05-09 DIAGNOSIS — N64.4 BREAST PAIN, RIGHT: Primary | ICD-10-CM

## 2024-05-09 DIAGNOSIS — E11.9 TYPE 2 DIABETES MELLITUS WITHOUT COMPLICATION, WITHOUT LONG-TERM CURRENT USE OF INSULIN (HCC): ICD-10-CM

## 2024-05-09 DIAGNOSIS — R19.4 ALTERED BOWEL HABITS: ICD-10-CM

## 2024-05-09 PROCEDURE — G2211 COMPLEX E/M VISIT ADD ON: HCPCS | Performed by: INTERNAL MEDICINE

## 2024-05-09 PROCEDURE — G8427 DOCREV CUR MEDS BY ELIG CLIN: HCPCS | Performed by: INTERNAL MEDICINE

## 2024-05-09 PROCEDURE — 99214 OFFICE O/P EST MOD 30 MIN: CPT | Performed by: INTERNAL MEDICINE

## 2024-05-09 PROCEDURE — G8399 PT W/DXA RESULTS DOCUMENT: HCPCS | Performed by: INTERNAL MEDICINE

## 2024-05-09 PROCEDURE — 1036F TOBACCO NON-USER: CPT | Performed by: INTERNAL MEDICINE

## 2024-05-09 PROCEDURE — G8417 CALC BMI ABV UP PARAM F/U: HCPCS | Performed by: INTERNAL MEDICINE

## 2024-05-09 PROCEDURE — 1123F ACP DISCUSS/DSCN MKR DOCD: CPT | Performed by: INTERNAL MEDICINE

## 2024-05-09 PROCEDURE — 1090F PRES/ABSN URINE INCON ASSESS: CPT | Performed by: INTERNAL MEDICINE

## 2024-05-09 RX ORDER — ERGOCALCIFEROL 1.25 MG/1
50000 CAPSULE ORAL WEEKLY
Qty: 12 CAPSULE | Refills: 3 | Status: SHIPPED | OUTPATIENT
Start: 2024-05-09

## 2024-05-09 SDOH — HEALTH STABILITY: PHYSICAL HEALTH: ON AVERAGE, HOW MANY DAYS PER WEEK DO YOU ENGAGE IN MODERATE TO STRENUOUS EXERCISE (LIKE A BRISK WALK)?: 7 DAYS

## 2024-05-09 SDOH — ECONOMIC STABILITY: FOOD INSECURITY: WITHIN THE PAST 12 MONTHS, YOU WORRIED THAT YOUR FOOD WOULD RUN OUT BEFORE YOU GOT MONEY TO BUY MORE.: NEVER TRUE

## 2024-05-09 SDOH — ECONOMIC STABILITY: FOOD INSECURITY: WITHIN THE PAST 12 MONTHS, THE FOOD YOU BOUGHT JUST DIDN'T LAST AND YOU DIDN'T HAVE MONEY TO GET MORE.: NEVER TRUE

## 2024-05-09 SDOH — HEALTH STABILITY: PHYSICAL HEALTH: ON AVERAGE, HOW MANY MINUTES DO YOU ENGAGE IN EXERCISE AT THIS LEVEL?: 120 MIN

## 2024-05-09 SDOH — ECONOMIC STABILITY: INCOME INSECURITY: HOW HARD IS IT FOR YOU TO PAY FOR THE VERY BASICS LIKE FOOD, HOUSING, MEDICAL CARE, AND HEATING?: NOT HARD AT ALL

## 2024-05-09 ASSESSMENT — PATIENT HEALTH QUESTIONNAIRE - PHQ9
SUM OF ALL RESPONSES TO PHQ9 QUESTIONS 1 & 2: 0
SUM OF ALL RESPONSES TO PHQ QUESTIONS 1-9: 0
1. LITTLE INTEREST OR PLEASURE IN DOING THINGS: NOT AT ALL
SUM OF ALL RESPONSES TO PHQ QUESTIONS 1-9: 0
2. FEELING DOWN, DEPRESSED OR HOPELESS: NOT AT ALL

## 2024-05-09 ASSESSMENT — LIFESTYLE VARIABLES: HOW OFTEN DO YOU HAVE A DRINK CONTAINING ALCOHOL: MONTHLY OR LESS

## 2024-05-09 ASSESSMENT — ENCOUNTER SYMPTOMS
BREAST PAIN: 1
ABDOMINAL PAIN: 1

## 2024-05-09 NOTE — PROGRESS NOTES
HISTORY OF PRESENT ILLNESS      Angelic Walton is a 78 y.o. female.    BP (!) 170/72   Pulse 74   Temp 97.5 °F (36.4 °C) (Temporal)   Resp 16   Ht 1.727 m (5' 8\")   Wt 76.1 kg (167 lb 12.8 oz)   SpO2 96%   BMI 25.51 kg/m²     She notes intermittent high epigastric pain  She reports the quality of her stools has changed. Some times small balls, sometimes not formed but more like thick chocolate, sometimes small thin streaks  Denies any med change. No diet changes  She does not eat sushi or raw foods.    Occasional nausea. 2-3 times a month    She had a colonoscopy and EGD on 5/23/23    Breast Pain  Chronicity:  Recurrent  Associated Symptoms: breast pain    Affected side:  Right  Onset:  1 to 4 weeks ago  Joint Pain   The pain is present in the left knee and right knee. This is a chronic problem. The current episode started more than 1 year ago. The problem occurs intermittently.   Abdominal Pain  This is a recurrent (see comments) problem. The current episode started more than 1 month ago. The problem occurs 2 to 4 times per day. Associated symptoms include arthralgias.       Review of Systems   Gastrointestinal:  Positive for abdominal pain.        Irregular stools   Musculoskeletal:  Positive for arthralgias. Gait problem: left knee pain.  Neurological:         Tingling in both feet now   Psychiatric/Behavioral:  The patient is nervous/anxious.         Worried about her memory. Is under some stress right now           Physical Exam  Vitals and nursing note reviewed.   Constitutional:       Appearance: Normal appearance.   HENT:      Head: Normocephalic and atraumatic.   Cardiovascular:      Rate and Rhythm: Normal rate and regular rhythm.   Pulmonary:      Effort: Pulmonary effort is normal.      Breath sounds: Normal breath sounds.   Abdominal:      General: Abdomen is flat. There is no distension.      Palpations: There is no mass.      Tenderness: There is abdominal tenderness (periumbilical

## 2024-05-10 DIAGNOSIS — E78.5 DYSLIPIDEMIA: Primary | ICD-10-CM

## 2024-05-10 LAB
ALBUMIN SERPL-MCNC: 4.2 G/DL (ref 3.8–4.8)
ALBUMIN/GLOB SERPL: 1.6 {RATIO} (ref 1.2–2.2)
ALP SERPL-CCNC: 82 IU/L (ref 44–121)
ALT SERPL-CCNC: 16 IU/L (ref 0–32)
APPEARANCE UR: CLEAR
AST SERPL-CCNC: 18 IU/L (ref 0–40)
BASOPHILS # BLD AUTO: 0.1 X10E3/UL (ref 0–0.2)
BASOPHILS NFR BLD AUTO: 2 %
BILIRUB SERPL-MCNC: 0.3 MG/DL (ref 0–1.2)
BILIRUB UR QL STRIP: NEGATIVE
BUN SERPL-MCNC: 15 MG/DL (ref 8–27)
BUN/CREAT SERPL: 20 (ref 12–28)
CALCIUM SERPL-MCNC: 10.2 MG/DL (ref 8.7–10.3)
CHLORIDE SERPL-SCNC: 103 MMOL/L (ref 96–106)
CHOLEST SERPL-MCNC: 234 MG/DL (ref 100–199)
CO2 SERPL-SCNC: 22 MMOL/L (ref 20–29)
COLOR UR: YELLOW
CREAT SERPL-MCNC: 0.74 MG/DL (ref 0.57–1)
EGFRCR SERPLBLD CKD-EPI 2021: 83 ML/MIN/1.73
EOSINOPHIL # BLD AUTO: 0.1 X10E3/UL (ref 0–0.4)
EOSINOPHIL NFR BLD AUTO: 2 %
ERYTHROCYTE [DISTWIDTH] IN BLOOD BY AUTOMATED COUNT: 12.6 % (ref 11.7–15.4)
GLOBULIN SER CALC-MCNC: 2.6 G/DL (ref 1.5–4.5)
GLUCOSE SERPL-MCNC: 87 MG/DL (ref 70–99)
GLUCOSE UR QL STRIP: NEGATIVE
HBA1C MFR BLD: 6.5 % (ref 4.8–5.6)
HCT VFR BLD AUTO: 42.5 % (ref 34–46.6)
HDLC SERPL-MCNC: 52 MG/DL
HGB BLD-MCNC: 14.3 G/DL (ref 11.1–15.9)
HGB UR QL STRIP: NEGATIVE
IMM GRANULOCYTES # BLD AUTO: 0 X10E3/UL (ref 0–0.1)
IMM GRANULOCYTES NFR BLD AUTO: 0 %
KETONES UR QL STRIP: NEGATIVE
LDLC SERPL CALC-MCNC: 154 MG/DL (ref 0–99)
LEUKOCYTE ESTERASE UR QL STRIP: NEGATIVE
LIPASE SERPL-CCNC: 33 U/L (ref 14–85)
LYMPHOCYTES # BLD AUTO: 2 X10E3/UL (ref 0.7–3.1)
LYMPHOCYTES NFR BLD AUTO: 37 %
MCH RBC QN AUTO: 31.6 PG (ref 26.6–33)
MCHC RBC AUTO-ENTMCNC: 33.6 G/DL (ref 31.5–35.7)
MCV RBC AUTO: 94 FL (ref 79–97)
MICRO URNS: NORMAL
MONOCYTES # BLD AUTO: 0.5 X10E3/UL (ref 0.1–0.9)
MONOCYTES NFR BLD AUTO: 9 %
NEUTROPHILS # BLD AUTO: 2.7 X10E3/UL (ref 1.4–7)
NEUTROPHILS NFR BLD AUTO: 50 %
NITRITE UR QL STRIP: NEGATIVE
PH UR STRIP: 5 [PH] (ref 5–7.5)
PLATELET # BLD AUTO: 231 X10E3/UL (ref 150–450)
POTASSIUM SERPL-SCNC: 4 MMOL/L (ref 3.5–5.2)
PROT SERPL-MCNC: 6.8 G/DL (ref 6–8.5)
PROT UR QL STRIP: NEGATIVE
RBC # BLD AUTO: 4.52 X10E6/UL (ref 3.77–5.28)
SODIUM SERPL-SCNC: 140 MMOL/L (ref 134–144)
SP GR UR STRIP: 1.03 (ref 1–1.03)
SPECIMEN STATUS REPORT: NORMAL
T4 FREE SERPL-MCNC: 0.91 NG/DL (ref 0.82–1.77)
TRIGL SERPL-MCNC: 156 MG/DL (ref 0–149)
TSH SERPL DL<=0.005 MIU/L-ACNC: 2.64 UIU/ML (ref 0.45–4.5)
UROBILINOGEN UR STRIP-MCNC: 1 MG/DL (ref 0.2–1)
VLDLC SERPL CALC-MCNC: 28 MG/DL (ref 5–40)
WBC # BLD AUTO: 5.4 X10E3/UL (ref 3.4–10.8)

## 2024-05-10 RX ORDER — ROSUVASTATIN CALCIUM 20 MG/1
20 TABLET, COATED ORAL DAILY
Qty: 90 TABLET | Refills: 1 | Status: SHIPPED | OUTPATIENT
Start: 2024-05-10

## 2024-05-10 NOTE — PROGRESS NOTES
Orders Placed This Encounter    rosuvastatin (CRESTOR) 20 MG tablet     Sig: Take 1 tablet by mouth daily     Dispense:  90 tablet     Refill:  1     Encounter Diagnosis   Name Primary?    Dyslipidemia Yes

## 2024-06-12 ENCOUNTER — TELEPHONE (OUTPATIENT)
Facility: CLINIC | Age: 79
End: 2024-06-12

## 2024-06-12 NOTE — TELEPHONE ENCOUNTER
Receive a message form the staff the pt returned a call regarding her issue.     Spoke with the pt and informed the pt had previously contacted the ED department by phone and was advised this is not an emergency department matter, go to see the PCP. Pt was advised it may be best to be evaluated by a healthcare provider. Pt request an appointment, dec;lines to to the ED. Pt verbalizes understanding. Call transferred to scheduling.

## 2024-06-12 NOTE — TELEPHONE ENCOUNTER
Patient called requesting a same day appointment with Dr. Andersen.    Informed her that there were no same day appts available today and asked the reasoning for the appt.   Patient stated that she has a 1.5hour to 2hours window that she cannot recollect yesterday and she has no idea what she did in that time.     She is concerned because she lives alone and said she was told it was not an ER problem and that she needs to see her pcp.    Please advise.

## 2024-06-17 ENCOUNTER — TELEPHONE (OUTPATIENT)
Facility: CLINIC | Age: 79
End: 2024-06-17

## 2024-06-17 DIAGNOSIS — N64.4 BREAST PAIN, LEFT: Primary | ICD-10-CM

## 2024-06-17 NOTE — TELEPHONE ENCOUNTER
Jihan aSwyer was calling to get an order for a (L) Breast ultrasound for lump.    She stated the patient was there in person at the time of the call and was inquiring if another doctor could place the order for the patient.    I informed her that the other doctors were not able to.

## 2024-06-17 NOTE — TELEPHONE ENCOUNTER
Patient is calling in and is VERY upset that you were not here to send an order to the imaging center, and that there is no one else who can do the order since you are not in office.  Patient states she can not keep driving all the way out to [facility]  for testing.  States she will be filing a formal complaint about this.

## 2024-06-17 NOTE — TELEPHONE ENCOUNTER
Called [facility] and left message. Call back number left and I myself or one of the other nurses will attempt to contact again.    The call was to inquire exactly what was noted and the specific order needed, as well as a tentative fax number. EPIC reviewed, the pt is scheduled for 6/20/2024 1:30 PM AT [facility]. Additionally, the pt is scheduled for 07/01/2024 1:30 PM at [facility].    Called placed to the pt regarding the concerns. Pt states she was sent to [facility] for the soonest mammogram testing and during examination complained of left breast pain. Pt states the staff notified her that an order for left breast ultrasound would be needed. PCP notified.    Post-Care Instructions: I reviewed with the patient in detail post-care instructions. Patient is to wear sunprotection, and avoid picking at any of the treated lesions. Pt may apply Vaseline to crusted or scabbing areas. Duration Of Freeze Thaw-Cycle (Seconds): 0 Render Note In Bullet Format When Appropriate: No Detail Level: Detailed Consent: The patient's consent was obtained including but not limited to risks of crusting, scabbing, blistering, scarring, darker or lighter pigmentary change, recurrence, incomplete removal and infection. Show Applicator Variable?: Yes

## 2024-06-17 NOTE — TELEPHONE ENCOUNTER
Patient called right back and asked that you please sign the order that was faxed over to you ASAP as soon as you come in tomorrow.

## 2024-06-18 NOTE — TELEPHONE ENCOUNTER
Jihan is returning your call, states the patient was in yesterday and while doing her exam patient complained about (L) breast tenderness and a lump..  States they will need an order for a (L) breast US (Dx:  Breast Lump) and it can be faxed to [phone].  Patient is due back on Thursday.

## 2024-06-18 NOTE — TELEPHONE ENCOUNTER
A mammogram and RIGHT u/s was ordered and sent to [facility] in early May. She NEVER mentioned her left breast!      Order placed.  Please send to [facility]

## 2024-06-25 ENCOUNTER — TELEPHONE (OUTPATIENT)
Facility: CLINIC | Age: 79
End: 2024-06-25

## 2024-06-25 NOTE — TELEPHONE ENCOUNTER
Spoke with pt in regards to rosuvastatin concerns. Two patient identifier's verified.  Pt states she knows what rosuvastatin is but she called her daughter in law and was instructed to not take this medication. Pt states she called the drug  and was informed it has something to do with the heart and not cholesterol. Pt is advised of the mechanism of action and rationale of why the PCP ordered rosuvastatin. Additionally, this writer relayed the PCP's note from lab results on 05/09/2024 . Pt acknowledges understanding and states she will do some more research on this medication and states he may or not take this. Pt states she already knew what this tis medication is , she has worked at the hospital for years. Pt states she wanted to make sure this is the right medication for her. Pt states she will discuss this with the PCP at the next office visit. Pt is advised she made an appointment last week and no showed. Pt is strongly encouraged to keep the 7/3/24 appointment. Pt provided the appointment time and date. Pt voices no concerns at this time.

## 2024-06-25 NOTE — TELEPHONE ENCOUNTER
If she wants to talk about this and ask any more medical questions, she needs to make an appointment.  I encourage her to keep her upcoming appointment

## 2024-06-25 NOTE — TELEPHONE ENCOUNTER
Patient is calling stating she wants to speak with CAROLYN Andersen IMMEDIATELY when she gets in this morning in reference to a new medication she was given, (Rosuvastatin).  States this medication is for heart issues and she does not have any heart issues.  Advised this medication is for cholesterol and she argued that  she called the doctors line and was told this medication is for heart issues.  States you can't keep scaring old people with medication.

## 2025-02-28 RX ORDER — ERGOCALCIFEROL 1.25 MG/1
50000 CAPSULE, LIQUID FILLED ORAL WEEKLY
Qty: 12 CAPSULE | Refills: 0 | OUTPATIENT
Start: 2025-02-28

## 2025-04-05 RX ORDER — ERGOCALCIFEROL 1.25 MG/1
50000 CAPSULE, LIQUID FILLED ORAL WEEKLY
Qty: 12 CAPSULE | Refills: 0 | OUTPATIENT
Start: 2025-04-05

## 2025-07-25 RX ORDER — ESTRADIOL 0.1 MG/D
PATCH TRANSDERMAL
Qty: 4 PATCH | Refills: 0 | OUTPATIENT
Start: 2025-07-25